# Patient Record
Sex: FEMALE | Race: ASIAN | NOT HISPANIC OR LATINO | Employment: UNEMPLOYED | URBAN - METROPOLITAN AREA
[De-identification: names, ages, dates, MRNs, and addresses within clinical notes are randomized per-mention and may not be internally consistent; named-entity substitution may affect disease eponyms.]

---

## 2021-07-30 ENCOUNTER — TELEPHONE (OUTPATIENT)
Dept: SCHEDULING | Facility: CLINIC | Age: 21
End: 2021-07-30

## 2021-07-30 NOTE — TELEPHONE ENCOUNTER
Reason for Call:  Other appointment    Detailed comments: first pregnancy visit- patient was scheduled incorrectly for end of September- first date of last menstrual period was June 19th- has had a positive home pregnancy test    Phone Number Patient can be reached at: Home number on file [phone] (home)    Best Time: any    Can we leave a detailed message on this number? YES    Call taken on 7/30/2021 at 9:57 AM by Roseanna Robertson

## 2021-07-31 ENCOUNTER — MYC MEDICAL ADVICE (OUTPATIENT)
Dept: FAMILY MEDICINE | Facility: CLINIC | Age: 21
End: 2021-07-31

## 2021-08-02 ENCOUNTER — MYC MEDICAL ADVICE (OUTPATIENT)
Dept: FAMILY MEDICINE | Facility: CLINIC | Age: 21
End: 2021-08-02

## 2021-08-02 DIAGNOSIS — Z34.90 PREGNANCY, UNSPECIFIED GESTATIONAL AGE: Primary | ICD-10-CM

## 2021-08-05 ENCOUNTER — MYC MEDICAL ADVICE (OUTPATIENT)
Dept: FAMILY MEDICINE | Facility: CLINIC | Age: 21
End: 2021-08-05

## 2021-08-05 NOTE — TELEPHONE ENCOUNTER
I responded to patient's concerns, however, she was requesting appointment sooner than 5/18/21. Okay to add into 20 or 40 minute slot next week (okay to use blocked spots). If no openings, then keep her appointment and let patient know. Thanks!

## 2021-08-13 ENCOUNTER — MYC MEDICAL ADVICE (OUTPATIENT)
Dept: FAMILY MEDICINE | Facility: CLINIC | Age: 21
End: 2021-08-13

## 2021-08-13 ENCOUNTER — PRENATAL OFFICE VISIT (OUTPATIENT)
Dept: FAMILY MEDICINE | Facility: CLINIC | Age: 21
End: 2021-08-13
Payer: COMMERCIAL

## 2021-08-13 ENCOUNTER — HOSPITAL ENCOUNTER (OUTPATIENT)
Dept: ULTRASOUND IMAGING | Facility: HOSPITAL | Age: 21
Discharge: HOME | End: 2021-08-13
Attending: FAMILY MEDICINE | Admitting: FAMILY MEDICINE
Payer: COMMERCIAL

## 2021-08-13 VITALS
DIASTOLIC BLOOD PRESSURE: 60 MMHG | HEIGHT: 60 IN | TEMPERATURE: 98.5 F | SYSTOLIC BLOOD PRESSURE: 104 MMHG | RESPIRATION RATE: 16 BRPM | WEIGHT: 128.5 LBS | OXYGEN SATURATION: 99 % | BODY MASS INDEX: 25.23 KG/M2 | HEART RATE: 75 BPM

## 2021-08-13 DIAGNOSIS — Z34.90 PREGNANCY, UNSPECIFIED GESTATIONAL AGE: ICD-10-CM

## 2021-08-13 DIAGNOSIS — Z34.00 SUPERVISION OF NORMAL FIRST PREGNANCY, ANTEPARTUM: ICD-10-CM

## 2021-08-13 DIAGNOSIS — Z34.90 PREGNANCY, UNSPECIFIED GESTATIONAL AGE: Primary | ICD-10-CM

## 2021-08-13 DIAGNOSIS — N91.2 AMENORRHEA: ICD-10-CM

## 2021-08-13 PROBLEM — E28.2 PCOS (POLYCYSTIC OVARIAN SYNDROME): Status: ACTIVE | Noted: 2020-07-02

## 2021-08-13 LAB
ABO/RH(D): NORMAL
ANTIBODY SCREEN: NEGATIVE
ERYTHROCYTE [DISTWIDTH] IN BLOOD BY AUTOMATED COUNT: 13 % (ref 10–15)
HBA1C MFR BLD: 5.4 % (ref 0–5.6)
HCG SERPL-ACNC: ABNORMAL MLU/ML (ref 0–4)
HCG UR QL: POSITIVE
HCT VFR BLD AUTO: 39.7 % (ref 35–47)
HGB BLD-MCNC: 13 G/DL (ref 11.7–15.7)
HIV 1+2 AB+HIV1 P24 AG SERPL QL IA: NEGATIVE
MCH RBC QN AUTO: 28.8 PG (ref 26.5–33)
MCHC RBC AUTO-ENTMCNC: 32.7 G/DL (ref 31.5–36.5)
MCV RBC AUTO: 88 FL (ref 78–100)
PLATELET # BLD AUTO: 211 10E3/UL (ref 150–450)
RBC # BLD AUTO: 4.51 10E6/UL (ref 3.8–5.2)
SPECIMEN EXPIRATION DATE: NORMAL
WBC # BLD AUTO: 7.2 10E3/UL (ref 4–11)

## 2021-08-13 PROCEDURE — 99000 SPECIMEN HANDLING OFFICE-LAB: CPT | Performed by: FAMILY MEDICINE

## 2021-08-13 PROCEDURE — 87491 CHLMYD TRACH DNA AMP PROBE: CPT | Performed by: FAMILY MEDICINE

## 2021-08-13 PROCEDURE — 85027 COMPLETE CBC AUTOMATED: CPT | Performed by: FAMILY MEDICINE

## 2021-08-13 PROCEDURE — 83655 ASSAY OF LEAD: CPT | Mod: 90 | Performed by: FAMILY MEDICINE

## 2021-08-13 PROCEDURE — 83036 HEMOGLOBIN GLYCOSYLATED A1C: CPT | Performed by: FAMILY MEDICINE

## 2021-08-13 PROCEDURE — 87086 URINE CULTURE/COLONY COUNT: CPT | Performed by: FAMILY MEDICINE

## 2021-08-13 PROCEDURE — 87389 HIV-1 AG W/HIV-1&-2 AB AG IA: CPT | Performed by: FAMILY MEDICINE

## 2021-08-13 PROCEDURE — 99207 PR FIRST OB VISIT: CPT | Performed by: FAMILY MEDICINE

## 2021-08-13 PROCEDURE — 84702 CHORIONIC GONADOTROPIN TEST: CPT | Performed by: FAMILY MEDICINE

## 2021-08-13 PROCEDURE — 87340 HEPATITIS B SURFACE AG IA: CPT | Performed by: FAMILY MEDICINE

## 2021-08-13 PROCEDURE — 86762 RUBELLA ANTIBODY: CPT | Performed by: FAMILY MEDICINE

## 2021-08-13 PROCEDURE — 86850 RBC ANTIBODY SCREEN: CPT | Performed by: FAMILY MEDICINE

## 2021-08-13 PROCEDURE — 86901 BLOOD TYPING SEROLOGIC RH(D): CPT | Performed by: FAMILY MEDICINE

## 2021-08-13 PROCEDURE — 36415 COLL VENOUS BLD VENIPUNCTURE: CPT | Performed by: FAMILY MEDICINE

## 2021-08-13 PROCEDURE — 87591 N.GONORRHOEAE DNA AMP PROB: CPT | Performed by: FAMILY MEDICINE

## 2021-08-13 PROCEDURE — 86780 TREPONEMA PALLIDUM: CPT | Performed by: FAMILY MEDICINE

## 2021-08-13 PROCEDURE — 81025 URINE PREGNANCY TEST: CPT | Performed by: FAMILY MEDICINE

## 2021-08-13 PROCEDURE — 86900 BLOOD TYPING SEROLOGIC ABO: CPT | Performed by: FAMILY MEDICINE

## 2021-08-13 PROCEDURE — 76801 OB US < 14 WKS SINGLE FETUS: CPT

## 2021-08-13 PROCEDURE — 99203 OFFICE O/P NEW LOW 30 MIN: CPT | Performed by: FAMILY MEDICINE

## 2021-08-13 PROCEDURE — 86803 HEPATITIS C AB TEST: CPT | Performed by: FAMILY MEDICINE

## 2021-08-13 RX ORDER — VITAMIN A ACETATE, BETA CAROTENE, ASCORBIC ACID, CHOLECALCIFEROL, .ALPHA.-TOCOPHEROL ACETATE, DL-, THIAMINE MONONITRATE, RIBOFLAVIN, NIACINAMIDE, PYRIDOXINE HYDROCHLORIDE, FOLIC ACID, CYANOCOBALAMIN, CALCIUM CARBONATE, FERROUS FUMARATE, ZINC OXIDE, CUPRIC OXIDE 3080; 12; 120; 400; 1; 1.84; 3; 20; 22; 920; 25; 200; 27; 10; 2 [IU]/1; UG/1; MG/1; [IU]/1; MG/1; MG/1; MG/1; MG/1; MG/1; [IU]/1; MG/1; MG/1; MG/1; MG/1; MG/1
1 TABLET, FILM COATED ORAL
COMMUNITY

## 2021-08-13 ASSESSMENT — MIFFLIN-ST. JEOR: SCORE: 1273.34

## 2021-08-13 NOTE — PROGRESS NOTES
New OB Clinic Note - 2021      SUBJECTIVE:  Dominga Moon is a 21 year old  female @ 13w1d who is here for new OB visit.   LMP 21- but also had some spotting for 3 days in . Has not yet had ultrasound- scheduled later today.  Current Concerns:   Seen at [facility] for infertility previously  She was going to start fertility treatments but then became pregnant  History of PCOS  Transferring here for care since she would like to deliver at [facility]  Occasional nausea.  She denies bleeding.  Occasional mild cramping. No loss of fluid. She denies HA.   Denies dysuria or hematuria.   Denies constipation.   OB History:  Patient is . Prior Pregnancies:  OB History    Para Term  AB Living   1 0 0 0 0 0   SAB TAB Ectopic Multiple Live Births   0 0 0 0 0      # Outcome Date GA Lbr Donald/2nd Weight Sex Delivery Anes PTL Lv   1 Current              She denies have a history of  birth before 37 weeks with a gomez gestation.  She denies have a history of preeclampsia in prior pregnancy.   She denies have a history of recurrent (>2) pregnancy losses.  She denies have a history of Down's syndrome, sickle cell anemia or other genetic disorder affecting a child in her family.  She denies have a history of major depression or postpartum depression.  She does not have a history of STI's including Chlamydia, gonorrhea, Hep B virus, syphilis, HPV, or genital herpes.   Social Hx:   She has support from her family and father of baby, , Ryan Leyva, is involved.   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  Current Medications: prenatal vitamins    Past Medical History:   Diagnosis Date     Infertility, female      Polycystic ovary syndrome      History reviewed. No pertinent surgical history.  Family History   Problem  "Relation Age of Onset     No Known Problems Mother      Heart Disease Father      Liver Disease Father      No Known Problems Brother      Polycystic ovary syndrome Sister      Current Outpatient Medications   Medication     Prenatal Vit-Fe Fumarate-FA (PRENATAL PLUS) 27-1 MG TABS     No current facility-administered medications for this visit.     ?  OBJECTIVE:  Vitals:    21 0906   BP: 104/60   Pulse: 75   Resp: 16   Temp: 98.5  F (36.9  C)   TempSrc: Oral   SpO2: 99%   Weight: 58.3 kg (128 lb 8 oz)   Height: 1.53 m (5' 0.25\")     Gen: Awake, alert, in no acute distress  CV: RRR with normal S1 and S2. No murmurs appreciated.  Resp: Lungs are clear to auscultation bilaterally without crackles or wheezing.  Abd: non-tender, non-distended.   Pelvic Exam: External genitalia normal. Not due for pap smear  Neuro: No focal deficits  FHT: did not visualize- has ultrasound later today    Results for orders placed or performed in visit on 21   HCG qualitative urine     Status: Abnormal   Result Value Ref Range    hCG Urine Qualitative Positive (A) Negative   Hemoglobin A1c     Status: Normal   Result Value Ref Range    Hemoglobin A1C 5.4 0.0 - 5.6 %   ABO/Rh type and screen     Status: None (In process)    Narrative    The following orders were created for panel order ABO/Rh type and screen.  Procedure                               Abnormality         Status                     ---------                               -----------         ------                     Adult Type and Screen[[id number]]                            In process                   Please view results for these tests on the individual orders.     ASSESSMENT/PLAN:  Dominga Moon is a 21 year old  at 13w1d by LMP. Estimated Date of Delivery: 22  1. Started prenatal vitamins.   2. Pelvic exam including GC, Chlamydia and PAP (if >21yrs) was completed.  3. Prenatal labs completed - prenatal profile, UA/UC, HIV   4. Reviewed eligibility " for low-dose aspirin therapy for prevention of preeclampsia (preeclampsia in prior pregnancy, pre-existing HTN or DM, or multiple other risk factors including  delivery, chronic HTN, DM, obesity, low socioeconomic status, non- ethnicity). Patient is not a candidate for this.   5. Reviewed eligibility for 17-hydroxyprogesterone therapy for prevention of  birth (prior gomez delivery before 37 weeks). Patient is not a candidate for this.   6. Offered first trimester screening for aneuploidy (trisomy 21, 13, 18) with laboratory and nuchal translucency and cell free DNA testing- she will check with insurance and let me know if she wants either of these  7. Counseled on benefits of breastfeeding. Patient is planning to breastfeed.   8. Discussed  diet, exercise, caffeine intake, daily vitamins, and regular prenatal exams.  Dominga was seen today for prenatal care.    Diagnoses and all orders for this visit:    Pregnancy, unspecified gestational age  -     Lead Venous Blood Confirm; Future  -     ABO/Rh type and screen; Future  -     Hepatitis B surface antigen; Future  -     CBC with platelets; Future  -     HIV Antigen Antibody Combo; Future  -     Rubella Antibody IgG Quantitative; Future  -     Treponema Abs w Reflex to RPR and Titer; Future  -     Cancel: Urine Culture Aerobic Bacterial  -     Hepatitis C antibody; Future  -     Hemoglobin A1c; Future  -     Urine Culture Aerobic Bacterial; Future  -     Lead Venous Blood Confirm  -     ABO/Rh type and screen  -     Hepatitis B surface antigen  -     CBC with platelets  -     HIV Antigen Antibody Combo  -     Rubella Antibody IgG Quantitative  -     Treponema Abs w Reflex to RPR and Titer  -     Hepatitis C antibody  -     Hemoglobin A1c  -     Urine Culture Aerobic Bacterial  -     Neisseria gonorrhoeae PCR  -     Chlamydia trachomatis PCR - Clinic Collect  -     HCG qualitative urine    ?  RTC in 4 weeks or sooner if problems. At next  visit: schedule anatomy scan around 20 weeks, discuss breastfeeding, offer quad screen.    Cassie Iyer MD    Options for treatment and follow-up care were reviewed with the patient and/or guardian. Dominga Moon and/or guardian engaged in the decision making process and verbalized understanding of the options discussed and agreed with the final plan.

## 2021-08-13 NOTE — PATIENT INSTRUCTIONS
"Patient Education     Pregnancy: Your First Trimester Changes  The first trimester is a time of rapid development for your baby. Because your baby is growing so quickly, it is important that you start a healthy lifestyle right away. By the end of the first trimester, your baby has formed all of its major body organs and weighs just over an ounce.      Actual size of baby is 1/4\"    Month 1 (weeks 1 to 4)  The placenta (the organ that nourishes your baby) begins to form. The brain, spinal cord, heart, gastrointestinal tract, and lungs begin to develop. Your baby is about   inch long by the end of the first month.      Actual size of baby is 1\"    Month 2 (weeks 5 to 8)  All of your baby s major body organs form. The face, fingers, toes, ears, and eyes appear. By the end of the month, your baby is about 1 inch long.      Actual size of baby is 3\"      Month 3 (weeks 9 to 12)  Your baby can open and close its fists and mouth. The sexual organs begin to form. As the first trimester ends, your baby is about 3 inches long.   [employer] last reviewed this educational content on 8/1/2020 2000-2021 [employer]. All rights reserved. This information is not intended as a substitute for professional medical care. Always follow your healthcare professional's instructions.           "

## 2021-08-14 LAB
BACTERIA UR CULT: NO GROWTH
HBV SURFACE AG SERPL QL IA: NONREACTIVE
T PALLIDUM AB SER QL: NEGATIVE

## 2021-08-15 LAB
C TRACH DNA SPEC QL NAA+PROBE: NEGATIVE
N GONORRHOEA DNA SPEC QL NAA+PROBE: NEGATIVE

## 2021-08-16 DIAGNOSIS — Z34.90 PREGNANCY, UNSPECIFIED GESTATIONAL AGE: Primary | ICD-10-CM

## 2021-08-16 LAB
HCV AB SERPL QL IA: NEGATIVE
RUBV IGG SERPL QL IA: POSITIVE

## 2021-08-17 ENCOUNTER — LAB (OUTPATIENT)
Dept: LAB | Facility: CLINIC | Age: 21
End: 2021-08-17
Payer: COMMERCIAL

## 2021-08-17 ENCOUNTER — MYC MEDICAL ADVICE (OUTPATIENT)
Dept: FAMILY MEDICINE | Facility: CLINIC | Age: 21
End: 2021-08-17

## 2021-08-17 DIAGNOSIS — Z34.90 PREGNANCY, UNSPECIFIED GESTATIONAL AGE: ICD-10-CM

## 2021-08-17 DIAGNOSIS — Z34.90 PREGNANCY, UNSPECIFIED GESTATIONAL AGE: Primary | ICD-10-CM

## 2021-08-17 LAB — HCG SERPL-ACNC: ABNORMAL MLU/ML (ref 0–4)

## 2021-08-17 PROCEDURE — 84702 CHORIONIC GONADOTROPIN TEST: CPT

## 2021-08-17 PROCEDURE — 36415 COLL VENOUS BLD VENIPUNCTURE: CPT

## 2021-08-17 NOTE — TELEPHONE ENCOUNTER
Called patient to discuss spotting.  She is currently 5 weeks and 5 days pregnant.  Patient had intercourse and noted a very faint pink on the paper after wiping.  Patient denies any abdominal pain or further bleeding.  Expressed that this can be normal with an increase in blood flow to the cervix.  Offered reassurance.  Patient had no further questions.    Maria Elena Uribe

## 2021-08-18 LAB — LEAD BLDV-MCNC: <2 UG/DL

## 2021-08-20 ENCOUNTER — LAB (OUTPATIENT)
Dept: LAB | Facility: CLINIC | Age: 21
End: 2021-08-20
Payer: COMMERCIAL

## 2021-08-20 DIAGNOSIS — Z34.90 PREGNANCY, UNSPECIFIED GESTATIONAL AGE: ICD-10-CM

## 2021-08-20 LAB — HCG SERPL-ACNC: ABNORMAL MLU/ML (ref 0–4)

## 2021-08-20 PROCEDURE — 84702 CHORIONIC GONADOTROPIN TEST: CPT

## 2021-08-20 PROCEDURE — 36415 COLL VENOUS BLD VENIPUNCTURE: CPT

## 2021-08-23 ENCOUNTER — HOSPITAL ENCOUNTER (OUTPATIENT)
Dept: ULTRASOUND IMAGING | Facility: HOSPITAL | Age: 21
Discharge: HOME | End: 2021-08-23
Attending: FAMILY MEDICINE | Admitting: FAMILY MEDICINE
Payer: COMMERCIAL

## 2021-08-23 ENCOUNTER — MYC MEDICAL ADVICE (OUTPATIENT)
Dept: FAMILY MEDICINE | Facility: CLINIC | Age: 21
End: 2021-08-23

## 2021-08-23 DIAGNOSIS — Z34.90 PREGNANCY, UNSPECIFIED GESTATIONAL AGE: Primary | ICD-10-CM

## 2021-08-23 DIAGNOSIS — Z34.90 PREGNANCY, UNSPECIFIED GESTATIONAL AGE: ICD-10-CM

## 2021-08-23 PROCEDURE — 76801 OB US < 14 WKS SINGLE FETUS: CPT

## 2021-08-23 NOTE — TELEPHONE ENCOUNTER
Reason for Call:  Other returning call    Detailed comments: Patient contacting doctor to request call to discuss US and lab testing.     Phone Number Patient can be reached at: Cell number on file:    Telephone Information:   Mobile [phone]     Best Time: ASAP     Can we leave a detailed message on this number? YES    Call taken on 8/23/2021 at 12:11 PM by Chris Hicks

## 2021-08-23 NOTE — TELEPHONE ENCOUNTER
I have called patient to discuss. Plan for clinic visit 8/25/21 to discuss options and consider repeating beta HCG.     Cassie Iyer

## 2021-08-25 ENCOUNTER — OFFICE VISIT (OUTPATIENT)
Dept: FAMILY MEDICINE | Facility: CLINIC | Age: 21
End: 2021-08-25
Payer: COMMERCIAL

## 2021-08-25 VITALS
HEIGHT: 60 IN | HEART RATE: 72 BPM | BODY MASS INDEX: 25.82 KG/M2 | TEMPERATURE: 98.3 F | SYSTOLIC BLOOD PRESSURE: 106 MMHG | DIASTOLIC BLOOD PRESSURE: 68 MMHG | OXYGEN SATURATION: 99 % | RESPIRATION RATE: 16 BRPM | WEIGHT: 131.5 LBS

## 2021-08-25 DIAGNOSIS — O20.0 THREATENED MISCARRIAGE: Primary | ICD-10-CM

## 2021-08-25 DIAGNOSIS — Z34.90 PREGNANCY, UNSPECIFIED GESTATIONAL AGE: ICD-10-CM

## 2021-08-25 LAB — HCG SERPL-ACNC: ABNORMAL MLU/ML (ref 0–4)

## 2021-08-25 PROCEDURE — 36415 COLL VENOUS BLD VENIPUNCTURE: CPT | Performed by: FAMILY MEDICINE

## 2021-08-25 PROCEDURE — 84702 CHORIONIC GONADOTROPIN TEST: CPT | Performed by: FAMILY MEDICINE

## 2021-08-25 PROCEDURE — 99214 OFFICE O/P EST MOD 30 MIN: CPT | Performed by: FAMILY MEDICINE

## 2021-08-25 ASSESSMENT — MIFFLIN-ST. JEOR: SCORE: 1286.95

## 2021-08-26 ENCOUNTER — MYC MEDICAL ADVICE (OUTPATIENT)
Dept: FAMILY MEDICINE | Facility: CLINIC | Age: 21
End: 2021-08-26

## 2021-08-27 NOTE — TELEPHONE ENCOUNTER
Called patient to discuss. She can take at home pregnancy test in 3-4 weeks. If negative, no follow-up needed. Reviewed warning signs.

## 2021-08-27 NOTE — TELEPHONE ENCOUNTER
RN Routing to provider:     Pt reports passing  a clot of tissue at 2155 8/26/21. (attached google image of similar)    This morning pt reports headache, fatigue, overnight cramping relieved somewhat by ibuprofen,  bleeding requiring pad change aprx 2x day, with occasional 'very little, period-like clots', does not report fever.     Pt requests next steps       Pt report:    8/26: Just today, at 9:55pm, I had finally passed a blob of blood of what I assume is the gestational sac and everything but there is still bleeding. What are some follow ups that I need to come in for?    8/27: I am feeling exhausted because of the contractions last night, it was super painful but I took some ibuprofens and it helped soothe the pain a little. I m also having headaches as well. We ll I started having brown spotting on the 24. The bleeding does not soak a whole pad. I change probably just 2 pads a day. For right now, the bleeding has slowed down. I ve seen clots here and there but they are very little. Like regular period clots.    Deena Lui RN on 8/27/2021 at 9:14 AM

## 2021-10-08 ENCOUNTER — MYC MEDICAL ADVICE (OUTPATIENT)
Dept: FAMILY MEDICINE | Facility: CLINIC | Age: 21
End: 2021-10-08

## 2021-10-08 NOTE — TELEPHONE ENCOUNTER
Called patient. She is having light bleeding for about 8 days. Reviewed this could be normal after miscarriage- her cycles may take a few months to regulate. No severe pain or fever. If she continues to have bleeding by the end of next week, she will mychart and I could get her in to be seen. If she has any heavy bleeding or clots, she should be seen earlier.

## 2021-10-08 NOTE — TELEPHONE ENCOUNTER
RN routing to PCP for review     Patient needs clarification about bleeding    Deena Lui RN on 10/8/2021 at 9:16 AM

## 2021-11-04 ENCOUNTER — MYC MEDICAL ADVICE (OUTPATIENT)
Dept: FAMILY MEDICINE | Facility: CLINIC | Age: 21
End: 2021-11-04

## 2021-11-05 NOTE — TELEPHONE ENCOUNTER
RN called patient for more information about symptoms, rings and rolls to busy signal    Following up with mychart       Breast pain can be very normal at different times throughout the menstrual cycle.     Is the pain in one location in one breast? Or, is it more general and in both breasts?     Have you noticed an lumps or bumps?    And, are you having any discharge from your nipples?     Again, some temporary general soreness in the breasts is not unusual.     I would want you to contact us if you have:    1. pain in one area of one breast  2. have any lumps or bumps in your breast(s) or under your arm(s)  3. you are having any nipple discharge     If none of those apply, I think monitoring it for the next several days is fine.    Routing to pcp for review

## 2022-03-07 ENCOUNTER — MYC MEDICAL ADVICE (OUTPATIENT)
Dept: FAMILY MEDICINE | Facility: CLINIC | Age: 22
End: 2022-03-07
Payer: COMMERCIAL

## 2022-03-07 DIAGNOSIS — N91.2 AMENORRHEA: Primary | ICD-10-CM

## 2022-03-07 NOTE — TELEPHONE ENCOUNTER
You can double book in any pregnancy confirmation visits. Okay to add her to this Thursday at 1:20pm?    I will place the urine pregnancy order in case patient just wants to wait until the 17th, but okay to just add her to my schedule the 10th.    Thanks!

## 2022-03-07 NOTE — TELEPHONE ENCOUNTER
Jeannine-Please order blood or urine test for confirmation of pregnancy    Writer responded to pt via [employer] with LAB ONLY appt for confirmation of pregnancy on 3/17/2022 at 10:45AM    Robyn GODINEZ RN  [facility]

## 2022-03-07 NOTE — TELEPHONE ENCOUNTER
Writer responded via [employer] in regards to PCP message for pregnancy confirmation visit.  Waiting confirmation on pt availability on March 10, 2022 at 1:20pm.    Robyn GODINEZ RN  [facility]

## 2022-03-08 NOTE — TELEPHONE ENCOUNTER
Writer responded via [employer]. Awaiting reply on which appt pt wishes to keep.    Robyn GODINEZ RN  [facility]

## 2022-03-08 NOTE — TELEPHONE ENCOUNTER
Writer responded via [employer] in regards to future lab appt.    Robyn GODINEZ RN  [facility]

## 2022-03-08 NOTE — TELEPHONE ENCOUNTER
Writer responded via [employer] confirming appt on March 10th, 2022.    Robyn GODINEZ RN  [facility]

## 2022-03-10 ENCOUNTER — OFFICE VISIT (OUTPATIENT)
Dept: FAMILY MEDICINE | Facility: CLINIC | Age: 22
End: 2022-03-10
Payer: COMMERCIAL

## 2022-03-10 VITALS
HEART RATE: 71 BPM | RESPIRATION RATE: 16 BRPM | WEIGHT: 126.75 LBS | HEIGHT: 60 IN | OXYGEN SATURATION: 99 % | BODY MASS INDEX: 24.88 KG/M2 | DIASTOLIC BLOOD PRESSURE: 64 MMHG | SYSTOLIC BLOOD PRESSURE: 100 MMHG

## 2022-03-10 DIAGNOSIS — N91.2 AMENORRHEA: ICD-10-CM

## 2022-03-10 DIAGNOSIS — Z34.90 PREGNANCY, UNSPECIFIED GESTATIONAL AGE: Primary | ICD-10-CM

## 2022-03-10 LAB — HCG UR QL: POSITIVE

## 2022-03-10 PROCEDURE — 81025 URINE PREGNANCY TEST: CPT | Performed by: FAMILY MEDICINE

## 2022-03-10 PROCEDURE — 99213 OFFICE O/P EST LOW 20 MIN: CPT | Performed by: FAMILY MEDICINE

## 2022-03-10 NOTE — PROGRESS NOTES
"Assessment/Plan:     Dominga Moon is a 21 year old  here for confirmation of pregnancy.    Dominga was seen today for pregnancy test.    Diagnoses and all orders for this visit:    Pregnancy, unspecified gestational age:  at 6w0d by exact LMP. Cycles are regular but longer- every 37 days. Will plan for dating US- gave # to schedule- prefers to wait until around 10 weeks. Continue prenatal vitamin.   -     US OB < 14 Weeks Single; Future  -     HCG qualitative urine; Future  -     HCG qualitative urine      Medications were reviewed for safety in pregnancy.  Risk factors identified today: none    Return to clinic in about 4-5 weeks for Initial OB Visit.     Subjective:     Dominga Moon is a 21 year old female who presents for evaluation of pregnancy confirmation.   LMP 22- exact  Last period was normal- every 37 days.  History of SAB in August  Current contraception:  None   Pregnancy is planned- excited. Have not yet told any family members.    Current symptoms also include: breast tenderness and fatigue. Occasional back pain/cramping. No bleeding or spotting.  UPT- positive on Monday.    Risk behaviors:    Tobacco use:  reports that she has never smoked. She has never used smokeless tobacco.  Drug or alcohol use: no      Current Outpatient Medications:      Prenatal Vit-Fe Fumarate-FA (PRENATAL PLUS) 27-1 MG TABS, Take 1 tablet by mouth , Disp: , Rfl:          Objective:     /64   Pulse 71   Resp 16   Ht 1.53 m (5' 0.25\")   Wt 57.5 kg (126 lb 12 oz)   SpO2 99%   BMI 24.55 kg/m    Gen:  A&A, NAD.  Abd: soft, non-tender  FHT's: too early    Lab Review  Results for orders placed or performed in visit on 03/10/22   HCG qualitative urine   Result Value Ref Range    hCG Urine Qualitative Positive (A) Negative              "

## 2022-03-14 ENCOUNTER — MYC MEDICAL ADVICE (OUTPATIENT)
Dept: FAMILY MEDICINE | Facility: CLINIC | Age: 22
End: 2022-03-14
Payer: COMMERCIAL

## 2022-03-14 DIAGNOSIS — N91.2 AMENORRHEA: Primary | ICD-10-CM

## 2022-03-16 ENCOUNTER — LAB (OUTPATIENT)
Dept: LAB | Facility: CLINIC | Age: 22
End: 2022-03-16
Payer: COMMERCIAL

## 2022-03-16 DIAGNOSIS — N91.2 AMENORRHEA: ICD-10-CM

## 2022-03-16 DIAGNOSIS — Z34.90 PREGNANCY, UNSPECIFIED GESTATIONAL AGE: Primary | ICD-10-CM

## 2022-03-16 LAB — HCG SERPL-ACNC: 2457 MLU/ML (ref 0–4)

## 2022-03-16 PROCEDURE — 84702 CHORIONIC GONADOTROPIN TEST: CPT

## 2022-03-16 PROCEDURE — 36415 COLL VENOUS BLD VENIPUNCTURE: CPT

## 2022-03-16 NOTE — TELEPHONE ENCOUNTER
Patient requesting follow-up quantitative hCG    -routing to PCP for orders if appropriate    Deena Lui RN on 3/16/2022 at 5:01 PM

## 2022-03-18 NOTE — TELEPHONE ENCOUNTER
Writer responded via [employer] for further information.    Robyn GODINEZ RN  [facility]

## 2022-03-21 NOTE — TELEPHONE ENCOUNTER
Reviewed my chart messages and spoke with RN. RN will call patient to see if she can come in within the next 40 minutes- if not, plan to keep the appointment tomorrow with Dr. Alvarez- will recheck beta HCG at that time.     Cassie Iyer

## 2022-03-21 NOTE — TELEPHONE ENCOUNTER
Writer talked with provider in regards to appt today.  Called pt and expressed that provider could see her now if she was able to come.  Pt stated that she has a class at 10am and will just keep the appt tomorrow with Dr. Alvarez.    Recommendations given to call clinic back or seek further medical care at the nearest Walk In Clinic or ED if her spotting should become heavier with or without cramping and to continue to abstain from sexual intercourse until she sees the provider tomorrow.    Pt expressed verbal understanding and agreement and will keep appt tomorrow.    Robyn GODINEZ RN  [facility]

## 2022-03-21 NOTE — TELEPHONE ENCOUNTER
Dr. Iyer-See "[employer] message thread in regards to spotting with intercourse.  Please advise.  Okay to DB?    Robyn GODINEZ RN  [facility]

## 2022-03-22 ENCOUNTER — OFFICE VISIT (OUTPATIENT)
Dept: FAMILY MEDICINE | Facility: CLINIC | Age: 22
End: 2022-03-22
Payer: COMMERCIAL

## 2022-03-22 VITALS
HEIGHT: 60 IN | DIASTOLIC BLOOD PRESSURE: 60 MMHG | TEMPERATURE: 98.4 F | HEART RATE: 82 BPM | RESPIRATION RATE: 16 BRPM | BODY MASS INDEX: 25.13 KG/M2 | WEIGHT: 128 LBS | SYSTOLIC BLOOD PRESSURE: 120 MMHG | OXYGEN SATURATION: 99 %

## 2022-03-22 DIAGNOSIS — Z34.90 EARLY STAGE OF PREGNANCY: ICD-10-CM

## 2022-03-22 DIAGNOSIS — O26.851 SPOTTING AFFECTING PREGNANCY IN FIRST TRIMESTER: Primary | ICD-10-CM

## 2022-03-22 LAB — HCG SERPL-ACNC: ABNORMAL MLU/ML (ref 0–4)

## 2022-03-22 PROCEDURE — 84702 CHORIONIC GONADOTROPIN TEST: CPT | Performed by: FAMILY MEDICINE

## 2022-03-22 PROCEDURE — 99214 OFFICE O/P EST MOD 30 MIN: CPT | Performed by: FAMILY MEDICINE

## 2022-03-22 PROCEDURE — 36415 COLL VENOUS BLD VENIPUNCTURE: CPT | Performed by: FAMILY MEDICINE

## 2022-03-22 NOTE — PATIENT INSTRUCTIONS
Gisela:   Here is more information on the different types of options for your prenatal care:  Prenatal and Maternal Care ([email or web address])   -Dr. Alvarez      Patient Education     Bleeding During Early Pregnancy    If you ve had bleeding early in your pregnancy, you re not alone. Many other pregnant women have early bleeding, too. And in most cases, nothing is wrong. But your healthcare provider still needs to know about it. They may want to do tests to find out why you re bleeding. Call your provider if you see bleeding during pregnancy. Tell your provider if your blood is Rh negative. Then they can figure out if you need anti-D immune globulin treatment.  What causes early bleeding?  The cause of bleeding early in pregnancy is often unknown. But many factors early on in pregnancy may lead to light bleeding (called spotting) or heavier bleeding. These include:    Having sex    When the embryo implants on the uterine wall    Bleeding between the sac membrane and the uterus (subchorionic bleeding)    Pregnancy loss (miscarriage)    The embryo implants outside of the uterus (ectopic pregnancy)  If you see spotting  Light bleeding is the most common type of bleeding in early pregnancy. If you see it, call your healthcare provider. Chances are, they will tell you that you can care for yourself at home.  If tests are needed  Depending on how much you bleed, your healthcare provider may ask you to come in for some tests. A pelvic exam, for instance, can help see how far along your pregnancy is. You also may have an ultrasound or a Doppler test. These imaging tests use sound waves to check the health of your baby. The ultrasound may be done on your belly or inside your vagina. You may also need a special blood test. This test compares your hormone levels in blood samples taken 2 days apart. The results can help your provider learn more about the implantation of the embryo. Your blood type will also need to be checked to assess  if you will need to be treated for Rh sensitization.   Warning signs  If your bleeding doesn t stop or if you have any of the following, get medical care right away:    Soaking a sanitary pad each hour    Bleeding like you re having a period    Cramping or severe belly pain    Feeling dizzy or faint    Tissue passing through your vagina    Bleeding at any time after the first trimester  Questions you may be asked  Bleeding early in pregnancy isn't normal. But it is common. If you ve seen any bleeding, you may be concerned. But keep in mind that bleeding alone doesn t mean something is wrong. Just be sure to call your healthcare provider right away. They may ask you questions like these to help find the cause of your bleeding:    When did your bleeding start?    Is your bleeding very light or is it like a period?    Is the blood bright red or brownish?    Have you had sex recently?    Have you had pain or cramping?    Have you felt dizzy or faint?  Monitoring your pregnancy  Bleeding will often stop as quickly as it began. Your pregnancy may go on a normal path again. You may need to make a few extra prenatal visits. But you and your baby will most likely be fine.  [employer] last reviewed this educational content on 2/1/2020 2000-2021 [employer]. All rights reserved. This information is not intended as a substitute for professional medical care. Always follow your healthcare professional's instructions.

## 2022-03-22 NOTE — PROGRESS NOTES
ASSESSMENT/PLAN:    21 year old  at 7w5d by certain LMP 22, although cycles long and dates could be a little off.    Spotting affecting pregnancy in first trimester  Suspect friable cervix given most of bleeding is postcoital, although cannot rule out subchorionic hemorrhage, miscarriage, or other causes.  She has not yet had an ultrasound, and prefers to wait on that until the one scheduled on .  Will get another quantitative beta-hCG today for comparison (had one last week).  Further management will depend on those results and her symptoms.  If serum hCG is rising appropriately and she is not feeling any worse, I think it is okay to hold off until the  ultrasound as currently scheduled.  - HCG quantitative pregnancy    Early stage of pregnancy  Patient had a few questions on what is safe and what to expect during pregnancy, as well as options for her prenatal care.  We specifically discussed normal vaginal discharge and pregnancy, back pain, topical medications to avoid during pregnancy (specifically would avoid retinoids), and a difference between family medicine with obstetrics, midwife, or OB/GYN care.       Return in about 23 days (around 2022) for Initial OB, with your regular doctor (if you are continuing with physician care).     I spent a total of 38 minutes on the day of the visit.   Time spent doing chart review, history and exam, documentation and further activities per the note     SUBJECTIVE:  Dominga Moon is a 21 year old female here for Spotting with intercourse during pregnancy (x 1 week )    Spotting 8 days ago after intercourse  Just a little, pink.    Spotting again 6 days after intercourse.    Brown discharge 2 days ago after orgasm without vaginal intercourse..    No cramping.    No spotting/bleeding now.    Still feels pregnant.    Had miscarriage last Aug    OBJECTIVE:  :  /60   Pulse 82   Temp 98.4  F (36.9  C) (Oral)   Resp 16   Ht 1.53 m (5'  "0.25\")   Wt 58.1 kg (128 lb)   LMP 01/27/2022 (Exact Date)   SpO2 99%   BMI 24.79 kg/m    Wt Readings from Last 3 Encounters:   03/22/22 58.1 kg (128 lb)   03/10/22 57.5 kg (126 lb 12 oz)   08/25/21 59.6 kg (131 lb 8 oz)         Gen:  A&A, NAD  Abd: soft and nontender.  Unable to easily locate definite fetus on hand-held ultrasound  :  A couple of spots of blood on surface of cervix, cervix friable.  No blood seen at os.  Cervical os closed on digital exam.        "

## 2022-04-03 ENCOUNTER — HEALTH MAINTENANCE LETTER (OUTPATIENT)
Age: 22
End: 2022-04-03

## 2022-04-07 ENCOUNTER — HOSPITAL ENCOUNTER (OUTPATIENT)
Dept: ULTRASOUND IMAGING | Facility: HOSPITAL | Age: 22
Discharge: HOME | End: 2022-04-07
Attending: FAMILY MEDICINE | Admitting: FAMILY MEDICINE
Payer: COMMERCIAL

## 2022-04-07 DIAGNOSIS — Z34.90 PREGNANCY, UNSPECIFIED GESTATIONAL AGE: ICD-10-CM

## 2022-04-07 PROCEDURE — 76801 OB US < 14 WKS SINGLE FETUS: CPT

## 2022-04-14 ENCOUNTER — PRENATAL OFFICE VISIT (OUTPATIENT)
Dept: FAMILY MEDICINE | Facility: CLINIC | Age: 22
End: 2022-04-14
Payer: COMMERCIAL

## 2022-04-14 VITALS
OXYGEN SATURATION: 99 % | SYSTOLIC BLOOD PRESSURE: 110 MMHG | TEMPERATURE: 98 F | RESPIRATION RATE: 16 BRPM | HEART RATE: 82 BPM | DIASTOLIC BLOOD PRESSURE: 66 MMHG | WEIGHT: 130 LBS | HEIGHT: 60 IN | BODY MASS INDEX: 25.52 KG/M2

## 2022-04-14 DIAGNOSIS — Z34.90 PREGNANCY, UNSPECIFIED GESTATIONAL AGE: Primary | ICD-10-CM

## 2022-04-14 LAB
ABO/RH(D): NORMAL
ANTIBODY SCREEN: NEGATIVE
ERYTHROCYTE [DISTWIDTH] IN BLOOD BY AUTOMATED COUNT: 13 % (ref 10–15)
HCT VFR BLD AUTO: 38.3 % (ref 35–47)
HGB BLD-MCNC: 12.6 G/DL (ref 11.7–15.7)
HIV 1+2 AB+HIV1 P24 AG SERPL QL IA: NEGATIVE
MCH RBC QN AUTO: 28.7 PG (ref 26.5–33)
MCHC RBC AUTO-ENTMCNC: 32.9 G/DL (ref 31.5–36.5)
MCV RBC AUTO: 87 FL (ref 78–100)
PLATELET # BLD AUTO: 229 10E3/UL (ref 150–450)
RBC # BLD AUTO: 4.39 10E6/UL (ref 3.8–5.2)
SPECIMEN EXPIRATION DATE: NORMAL
WBC # BLD AUTO: 9.6 10E3/UL (ref 4–11)

## 2022-04-14 PROCEDURE — 83655 ASSAY OF LEAD: CPT | Mod: 90 | Performed by: FAMILY MEDICINE

## 2022-04-14 PROCEDURE — 85027 COMPLETE CBC AUTOMATED: CPT | Performed by: FAMILY MEDICINE

## 2022-04-14 PROCEDURE — 87086 URINE CULTURE/COLONY COUNT: CPT | Performed by: FAMILY MEDICINE

## 2022-04-14 PROCEDURE — 86762 RUBELLA ANTIBODY: CPT | Performed by: FAMILY MEDICINE

## 2022-04-14 PROCEDURE — 99207 PR FIRST OB VISIT: CPT | Performed by: FAMILY MEDICINE

## 2022-04-14 PROCEDURE — 87340 HEPATITIS B SURFACE AG IA: CPT | Performed by: FAMILY MEDICINE

## 2022-04-14 PROCEDURE — 87389 HIV-1 AG W/HIV-1&-2 AB AG IA: CPT | Performed by: FAMILY MEDICINE

## 2022-04-14 PROCEDURE — 86850 RBC ANTIBODY SCREEN: CPT | Performed by: FAMILY MEDICINE

## 2022-04-14 PROCEDURE — 99213 OFFICE O/P EST LOW 20 MIN: CPT | Performed by: FAMILY MEDICINE

## 2022-04-14 PROCEDURE — 99000 SPECIMEN HANDLING OFFICE-LAB: CPT | Performed by: FAMILY MEDICINE

## 2022-04-14 PROCEDURE — 36415 COLL VENOUS BLD VENIPUNCTURE: CPT | Performed by: FAMILY MEDICINE

## 2022-04-14 PROCEDURE — 86803 HEPATITIS C AB TEST: CPT | Performed by: FAMILY MEDICINE

## 2022-04-14 PROCEDURE — 87591 N.GONORRHOEAE DNA AMP PROB: CPT | Performed by: FAMILY MEDICINE

## 2022-04-14 PROCEDURE — 87491 CHLMYD TRACH DNA AMP PROBE: CPT | Performed by: FAMILY MEDICINE

## 2022-04-14 PROCEDURE — 86780 TREPONEMA PALLIDUM: CPT | Performed by: FAMILY MEDICINE

## 2022-04-14 PROCEDURE — 86901 BLOOD TYPING SEROLOGIC RH(D): CPT | Performed by: FAMILY MEDICINE

## 2022-04-14 PROCEDURE — 86900 BLOOD TYPING SEROLOGIC ABO: CPT | Performed by: FAMILY MEDICINE

## 2022-04-14 NOTE — PROGRESS NOTES
New OB Clinic Note - 2022     SUBJECTIVE:  Gisela Moon is a 21 year old  female @ 8w6d who is here for new OB visit.   LMP 22.  Ultrasound at 7w6d gave EDC of 22.  Current Concerns: She reports nausea- drinking water helps her symptoms. Sensitive to certain smells.   She denies bleeding, cramping. No loss of fluid. She denies HA.   Denies dysuria or hematuria.   Denies constipation.  OB History:  Patient is . Prior Pregnancies:  OB History    Para Term  AB Living   2 0 0 0 1 0   SAB IAB Ectopic Multiple Live Births   1 0 0 0 0      # Outcome Date GA Lbr Donald/2nd Weight Sex Delivery Anes PTL Lv   2 Current            1 SAB 2021             She does not have a history of  birth before 37 weeks with a gomez gestation.  She does not have a history of preeclampsia in prior pregnancy.   She does not have a history of recurrent (>2) pregnancy losses.  She does not have a history of Down's syndrome, sickle cell anemia or other genetic disorder affecting a child in her family.  She does not have a history of major depression or postpartum depression.  She does not have a history of STI's including Chlamydia, gonorrhea, Hep B virus, syphilis, HPV, or genital herpes.   Social Hx:   She has support from her , Ryan Leyva  She has not used tobacco, has not used EtOH and has not used illicit drugs.  Current Medications: prenatal vitamins    Past Medical History:   Diagnosis Date     Infertility, female      Polycystic ovary syndrome      No past surgical history on file.  Family History   Problem Relation Age of Onset     No Known Problems Mother      Heart Disease Father      Liver Disease Father      No Known Problems Brother      Polycystic ovary syndrome Sister      Current Outpatient Medications   Medication     Prenatal Vit-Fe Fumarate-FA (PRENATAL PLUS) 27-1 MG TABS     No current facility-administered medications for this visit.     ?  OBJECTIVE:  Vitals:  "   04/14/22 1253   BP: 110/66   Pulse: 82   Resp: 16   Temp: 98  F (36.7  C)   TempSrc: Oral   SpO2: 99%   Weight: 59 kg (130 lb)   Height: 1.53 m (5' 0.25\")     Gen: Awake, alert, in no acute distress  Abd: non-tender, non-distended. Bowel sounds present.   Pelvic Exam: External genitalia normal  Lower extremities: No edema  Neuro: No focal deficits  FHT: normal  Results for orders placed or performed in visit on 04/14/22   Hepatitis B surface antigen     Status: Normal   Result Value Ref Range    Hepatitis B Surface Antigen Nonreactive Nonreactive   CBC with platelets     Status: Normal   Result Value Ref Range    WBC Count 9.6 4.0 - 11.0 10e3/uL    RBC Count 4.39 3.80 - 5.20 10e6/uL    Hemoglobin 12.6 11.7 - 15.7 g/dL    Hematocrit 38.3 35.0 - 47.0 %    MCV 87 78 - 100 fL    MCH 28.7 26.5 - 33.0 pg    MCHC 32.9 31.5 - 36.5 g/dL    RDW 13.0 10.0 - 15.0 %    Platelet Count 229 150 - 450 10e3/uL   HIV Antigen Antibody Combo     Status: Normal   Result Value Ref Range    HIV Antigen Antibody Combo Negative Negative   Rubella Antibody IgG     Status: None   Result Value Ref Range    Rubella Avelina IgG Instrument Value 1.67 <0.90 Index    Rubella Antibody IgG Positive    Treponema Abs w Reflex to RPR and Titer     Status: Normal   Result Value Ref Range    Treponema Antibody Total Nonreactive Nonreactive   Hepatitis C antibody     Status: Normal   Result Value Ref Range    Hepatitis C Antibody Negative Negative    Narrative    Assay performance characteristics have not been established for newborns, infants, children (<18 years) or populations of immunocompromised or immunosuppressed patients.    Lead Venous Blood Confirm     Status: None   Result Value Ref Range    Lead Venous Blood <2.0 <=4.9 ug/dL   Adult Type and Screen     Status: None   Result Value Ref Range    ABO/RH(D) B POS     Antibody Screen Negative Negative    SPECIMEN EXPIRATION DATE [id number]    Urine Culture Aerobic Bacterial     Status: None    " Specimen: Urine, NOS   Result Value Ref Range    Culture No Growth    Chlamydia trachomatis PCR     Status: Normal    Specimen: Cervix; Swab   Result Value Ref Range    Chlamydia trachomatis Negative Negative   Neisseria gonorrhoeae PCR     Status: Normal    Specimen: Cervix; Swab   Result Value Ref Range    Neisseria gonorrhoeae Negative Negative   ABO/Rh type and screen     Status: None    Narrative    The following orders were created for panel order ABO/Rh type and screen.  Procedure                               Abnormality         Status                     ---------                               -----------         ------                     Adult Type and Screen[[id number]]                            Edited Result - FINAL        Please view results for these tests on the individual orders.     ASSESSMENT/PLAN:  Gisela Moon is a 21 year old  at 8w6d weeks by 7 week US. Estimated Date of Delivery: 2022.   1. Discussed recommended weight gain, healthy eating habits, exercise, common first trimester concerns (nausea, vomiting, constipation, fatigue, GERD, urinary frequency) and warning signs for miscarriage and  labor (bleeding, cramping).   2. Started prenatal vitamins.   3. Prenatal labs completed - prenatal profile, UA/UC, HIV   4. Reviewed eligibility for low-dose aspirin therapy for prevention of preeclampsia (preeclampsia in prior pregnancy, pre-existing HTN or DM, or multiple other risk factors including  delivery, chronic HTN, DM, obesity, low socioeconomic status, non- ethnicity). Patient is not a candidate for this.   5. Reviewed eligibility for 17-hydroxyprogesterone therapy for prevention of  birth (prior gomez delivery before 37 weeks). Patient is not a candidate for this.   6. Plan for cfDNA at next visit  7. Counseled on benefits of breastfeeding. Patient is planning to breastfeed.     Gisela Fuller was seen today for prenatal care.    Diagnoses and  all orders for this visit:    Pregnancy, unspecified gestational age  -     ABO/Rh type and screen; Future  -     Hepatitis B surface antigen; Future  -     CBC with platelets; Future  -     HIV Antigen Antibody Combo; Future  -     Rubella Antibody IgG; Future  -     Treponema Abs w Reflex to RPR and Titer; Future  -     Urine Culture Aerobic Bacterial; Future  -     Hepatitis C antibody; Future  -     Chlamydia trachomatis PCR; Future  -     Neisseria gonorrhoeae PCR; Future  -     Lead Venous Blood Confirm; Future  -     ABO/Rh type and screen  -     Hepatitis B surface antigen  -     CBC with platelets  -     HIV Antigen Antibody Combo  -     Rubella Antibody IgG  -     Treponema Abs w Reflex to RPR and Titer  -     Hepatitis C antibody  -     Lead Venous Blood Confirm  -     Urine Culture Aerobic Bacterial  -     Chlamydia trachomatis PCR  -     Neisseria gonorrhoeae PCR      ?  RTC in 4 weeks or sooner if problems. At next visit: cfDNA with anais Iyer MD    Options for treatment and follow-up care were reviewed with the patient and/or guardian. Gisela Moon and/or guardian engaged in the decision making process and verbalized understanding of the options discussed and agreed with the final plan.

## 2022-04-15 LAB
C TRACH DNA SPEC QL NAA+PROBE: NEGATIVE
HBV SURFACE AG SERPL QL IA: NONREACTIVE
HCV AB SERPL QL IA: NEGATIVE
N GONORRHOEA DNA SPEC QL NAA+PROBE: NEGATIVE
RUBV IGG SERPL QL IA: 1.67 INDEX
RUBV IGG SERPL QL IA: POSITIVE
T PALLIDUM AB SER QL: NONREACTIVE

## 2022-04-16 LAB — BACTERIA UR CULT: NO GROWTH

## 2022-04-17 ENCOUNTER — MYC MEDICAL ADVICE (OUTPATIENT)
Dept: FAMILY MEDICINE | Facility: CLINIC | Age: 22
End: 2022-04-17
Payer: COMMERCIAL

## 2022-04-18 LAB — LEAD BLDV-MCNC: <2 UG/DL

## 2022-04-18 NOTE — TELEPHONE ENCOUNTER
Attempted calling patient to discuss. No answer. Did not leave voice message.     Please call patient later to clarify how much bleeding she is having. Does she have any abdominal cramping?     If she has small amount of bleeding post-orgasm or intercourse, this can be normal, due to increased blood flow to the cervix during pregnancy.     However, if she has any abdominal cramping or increased vaginal bleeding, it would be best to do an in-person evaluation.

## 2022-04-18 NOTE — TELEPHONE ENCOUNTER
See message below.    Patient had small amount, maybe quarter size spot of bright red blood when wiping about 2 hours after intercourse yesterday.  Patient only had 1 episode and no clots or heavy bleeding.  Patient had no abdominal or back pain / cramping.  Patient has no fever or other symptoms.    Discussed with patient to call clinic back or triage line is open 24 hours a day if bleeding again, vaginal discharge or any pain.    Message routed to Dr Iyer for review / RADHA Ramirez RN  [facility],     After an orgasm, I am having some red bleeding. I usually have pink and brown spotting. But this time is different. I am really scared this is not safe. What should I do?      Gisela

## 2022-05-13 ENCOUNTER — PRENATAL OFFICE VISIT (OUTPATIENT)
Dept: FAMILY MEDICINE | Facility: CLINIC | Age: 22
End: 2022-05-13
Payer: COMMERCIAL

## 2022-05-13 VITALS
HEART RATE: 75 BPM | OXYGEN SATURATION: 99 % | HEIGHT: 60 IN | TEMPERATURE: 98 F | SYSTOLIC BLOOD PRESSURE: 100 MMHG | BODY MASS INDEX: 24.69 KG/M2 | DIASTOLIC BLOOD PRESSURE: 64 MMHG | WEIGHT: 125.75 LBS | RESPIRATION RATE: 16 BRPM

## 2022-05-13 DIAGNOSIS — Z34.90 PREGNANCY, UNSPECIFIED GESTATIONAL AGE: Primary | ICD-10-CM

## 2022-05-13 PROCEDURE — 36415 COLL VENOUS BLD VENIPUNCTURE: CPT | Performed by: FAMILY MEDICINE

## 2022-05-13 PROCEDURE — 99207 PR PRENATAL VISIT: CPT | Performed by: FAMILY MEDICINE

## 2022-05-13 NOTE — PATIENT INSTRUCTIONS
[phone] schedule your anatomy scan around 20 weeks (around July 1st)

## 2022-05-15 NOTE — PROGRESS NOTES
"SUBJECTIVE:   at 13w1d by 7 week US. Estimated Date of Delivery: 2022.  She is doing well. She denies abdominal pain/cramping, vaginal bleeding.  Concerns: interested in cell free DNA testing today- would like to find out gender. She would like to have her anatomy scan ultrasound at [facility] if possible  ROS  Negative except as noted above in HPI.  OBJECTIVE:  Blood pressure 100/64, pulse 75, temperature 98  F (36.7  C), temperature source Oral, resp. rate 16, height 1.53 m (5' 0.25\"), weight 57 kg (125 lb 12 oz), last menstrual period 2022, SpO2 99 %, not currently breastfeeding.  Gen: comfortable, no acute distress.  See OB Vitals flowsheet.  ASSESSMENT/PLAN:  Gisela Fuller was seen today for prenatal care.    Diagnoses and all orders for this visit:    Pregnancy, unspecified gestational age  -     Ob/Gyn Referral; Future- ordered- patient will call to schedule her anatomy scan around 20 weeks gestation  -     [facility] Non-Invasive Prenatal Screening; Future  -     [facility] Non-Invasive Prenatal Screening      -IUP at 13w1d:     Prenatal labs reviewed     RTC in 4 weeks or sooner with problems. Offer MSAFP next visit      Cassie Iyer MD    "

## 2022-05-17 LAB — SCANNED LAB RESULT: NORMAL

## 2022-06-10 ENCOUNTER — PRENATAL OFFICE VISIT (OUTPATIENT)
Dept: FAMILY MEDICINE | Facility: CLINIC | Age: 22
End: 2022-06-10
Payer: COMMERCIAL

## 2022-06-10 VITALS
DIASTOLIC BLOOD PRESSURE: 60 MMHG | BODY MASS INDEX: 24.94 KG/M2 | HEART RATE: 67 BPM | RESPIRATION RATE: 20 BRPM | OXYGEN SATURATION: 99 % | TEMPERATURE: 98.8 F | HEIGHT: 60 IN | WEIGHT: 127 LBS | SYSTOLIC BLOOD PRESSURE: 98 MMHG

## 2022-06-10 DIAGNOSIS — Z34.90 PREGNANCY, UNSPECIFIED GESTATIONAL AGE: Primary | ICD-10-CM

## 2022-06-10 DIAGNOSIS — G44.209 TENSION HEADACHE: ICD-10-CM

## 2022-06-10 PROCEDURE — 99207 PR PRENATAL VISIT: CPT | Performed by: FAMILY MEDICINE

## 2022-06-10 NOTE — PROGRESS NOTES
"SUBJECTIVE:   at 17w0d. Estimated Date of Delivery: 2022.  She is doing well. Not yet feeling fetal movement. Has ultrasound schedule 22 at [facility]. Tension headaches- more frequent since being pregnant. Using massage, tylenol as needed.  ROS  Negative except as noted above in HPI.  OBJECTIVE:  Blood pressure 98/60, pulse 67, temperature 98.8  F (37.1  C), temperature source Oral, resp. rate 20, height 1.53 m (5' 0.25\"), weight 57.6 kg (127 lb), last menstrual period 2022, SpO2 99 %, not currently breastfeeding.  Gen: comfortable, no acute distress.  See OB Vitals flowsheet.  ASSESSMENT/PLAN:  Gisela Fuller was seen today for prenatal care.    Diagnoses and all orders for this visit:    Pregnancy, unspecified gestational age    Tension headache: No warning signs. Normotensive. Reviewed symptomatic treatment.      -IUP at 17w0d:     Prenatal labs reviewed     Reviewed msAFP- declines    cfDNA low risk    RTC in 4 weeks or sooner with problems.        Cassie Iyer MD    "

## 2022-06-25 ENCOUNTER — MYC MEDICAL ADVICE (OUTPATIENT)
Dept: FAMILY MEDICINE | Facility: CLINIC | Age: 22
End: 2022-06-25

## 2022-06-25 DIAGNOSIS — O47.00 PRETERM CONTRACTIONS: ICD-10-CM

## 2022-06-25 DIAGNOSIS — Z34.00 SUPERVISION OF NORMAL FIRST PREGNANCY, ANTEPARTUM: Primary | ICD-10-CM

## 2022-06-29 NOTE — TELEPHONE ENCOUNTER
Mariana Iyer,  Please review patient message via [employer] and advise.      Thank you    CLARIBEL BookerN, RN  [facility]

## 2022-06-29 NOTE — TELEPHONE ENCOUNTER
Called patient and clarified. Only mild back pain. No cramping or bleeding. [facility]. Patient will cancel this appointment. Reviewed warning signs.    Cassie Iyer

## 2022-07-05 ENCOUNTER — TRANSFERRED RECORDS (OUTPATIENT)
Dept: HEALTH INFORMATION MANAGEMENT | Facility: CLINIC | Age: 22
End: 2022-07-05

## 2022-07-07 DIAGNOSIS — O26.879 SHORT CERVIX AFFECTING PREGNANCY: Primary | ICD-10-CM

## 2022-07-08 ENCOUNTER — PRENATAL OFFICE VISIT (OUTPATIENT)
Dept: FAMILY MEDICINE | Facility: CLINIC | Age: 22
End: 2022-07-08
Payer: COMMERCIAL

## 2022-07-08 VITALS
DIASTOLIC BLOOD PRESSURE: 60 MMHG | TEMPERATURE: 98 F | HEART RATE: 89 BPM | RESPIRATION RATE: 16 BRPM | BODY MASS INDEX: 26.16 KG/M2 | OXYGEN SATURATION: 99 % | WEIGHT: 133.25 LBS | HEIGHT: 60 IN | SYSTOLIC BLOOD PRESSURE: 104 MMHG

## 2022-07-08 DIAGNOSIS — Z34.90 PREGNANCY, UNSPECIFIED GESTATIONAL AGE: Primary | ICD-10-CM

## 2022-07-08 DIAGNOSIS — N88.3 SHORT CERVIX: ICD-10-CM

## 2022-07-08 PROCEDURE — 99207 PR PRENATAL VISIT: CPT | Performed by: FAMILY MEDICINE

## 2022-07-08 NOTE — PROGRESS NOTES
"SUBJECTIVE:   at 21w0d. Estimated Date of Delivery: 2022.  She is doing well. She denies nausea and vomiting. She denies vaginal bleeding, leakage of fluid. Fetal movement is present.   Concerns: Intermittent low back pain. Occasional sharp lower abdominal pain but denies any tightening or cramping.   ROS  Negative except as noted above in HPI.  OBJECTIVE:  Blood pressure 104/60, pulse 89, temperature 98  F (36.7  C), temperature source Oral, resp. rate 16, height 1.53 m (5' 0.25\"), weight 60.4 kg (133 lb 4 oz), last menstrual period 2022, SpO2 99 %, not currently breastfeeding.  Gen: comfortable, no acute distress.  See OB Vitals flowsheet.  ASSESSMENT/PLAN:  Gisela Fuller was seen today for prenatal care.    Diagnoses and all orders for this visit:    Pregnancy, unspecified gestational age    Short cervix: Cervical length 2.5 cm on anatomy scan- plan for repeat transvaginal US for cervical length in 2 weeks- ordered and patient will call Monday to [facility] to schedule.       -IUP at 21w0d:     Prenatal labs reviewed .     RTC in 4 weeks or sooner with problems. 1 hour GTT next visit      Cassie Iyer MD    "

## 2022-07-08 NOTE — PROGRESS NOTES
Called patient regarding ultrasound at [facility]- cervical length was 2.5 cm. Plan to repeat in 2 weeks per Dr. Thibodeaux recommendations. I called patient to discuss. Referral placed. Advised to call on Monday to schedule her appointment for in about 2 weeks.     Cassie Iyer

## 2022-07-12 ENCOUNTER — MYC MEDICAL ADVICE (OUTPATIENT)
Dept: FAMILY MEDICINE | Facility: CLINIC | Age: 22
End: 2022-07-12

## 2022-07-12 DIAGNOSIS — O26.879 SHORT CERVIX AFFECTING PREGNANCY: Primary | ICD-10-CM

## 2022-07-14 ENCOUNTER — NURSE TRIAGE (OUTPATIENT)
Dept: NURSING | Facility: CLINIC | Age: 22
End: 2022-07-14

## 2022-07-15 ENCOUNTER — TELEPHONE (OUTPATIENT)
Dept: FAMILY MEDICINE | Facility: CLINIC | Age: 22
End: 2022-07-15

## 2022-07-15 NOTE — TELEPHONE ENCOUNTER
Called Gisela to discuss cervical length. She [facility] I explained that her discharge may be just increased discharge of pregnancy. It is odorless and not itchy. She has not had any vaginal bleeding.     She has NOT had any abdominal cramping or tightness, just some pain on the side that she thinks is round ligament pain, and pain in her back. I explained if she DOES developing cramping and a tight painful feeling that is not getting better, she should go in to the hospital to be evaluated. She understands.      Rina Ochoa MD

## 2022-07-15 NOTE — TELEPHONE ENCOUNTER
OB Triage Call      Is patient's OB/Midwife with the [facility]? [facility]- Is patient's primary OB a Midwife? No- Proceed with triage.     Reason for call: vaginal discharge    Assessment: 21w6d passed some vaginal mucous, clear w/very slight pink tinge. Not entire mucous plug - looks like could be part of it. No contractions / abdominal pain. No vaginal bleeding. No gush or constant leakage of fluid. Feeling well otherwise.     Plan: Home care tonight.  Call back if abd pain/contractions, vaginal leakage, bleeding. Pt to call OB doctor tomorrow morning.     Patient's primary OB Provider is Dr Iyer     Per protocol recommendations Patient to follow home care recommendations.      Is patient's delivering hospital on divert? Does not apply due to Patient given home care instructions      21w6d    Estimated Date of Delivery: 2022        OB History    Para Term  AB Living   2 0 0 0 1 0   SAB IAB Ectopic Multiple Live Births   1 0 0 0 0      # Outcome Date GA Lbr Donald/2nd Weight Sex Delivery Anes PTL Lv   2 Current            1 SAB 2021               No results found for: GBS       Phyllis Richmond RN 22 9:17 PM  [facility] Nurse Advisor    Reason for Disposition    Pregnant    Additional Information    Negative: Passed out (i.e., lost consciousness, collapsed and was not responding)    Negative: Shock suspected (e.g., cold/pale/clammy skin, too weak to stand, low BP, rapid pulse)    Negative: Difficult to awaken or acting confused (e.g., disoriented, slurred speech)    Negative: SEVERE vaginal bleeding (e.g., continuous red blood from vagina, large blood clots)    Negative: [1] SEVERE abdominal pain (e.g., excruciating) AND [2] constant AND [3] present > 1 hour    Negative: Sounds like a life-threatening emergency to the triager    Negative: [1] Vaginal bleeding AND [2] pregnant > 20 weeks    Negative: [1] Vaginal bleeding AND [2] pregnant < 20 weeks    Negative:  "[1] Having contractions or other symptoms of labor AND [2] < 37 weeks pregnant (i.e., )    Negative: [1] Having contractions or other symptoms of labor AND [2] > 36 weeks pregnant (i.e., term pregnancy)    Negative: Leakage of fluid (trickle, gush) from the vagina    Negative: Foreign body in vagina (e.g., tampon)    Negative: Pain or burning with passing urine (urination) is main symptom    Negative: [1] Pregnant 23 or more weeks AND [2] baby is moving less today (e.g., kick count < 5 in 1 hour or < 10 in 2 hours)    Negative: Patient sounds very sick or weak to the triager    Negative: [1] Constant abdominal pain AND [2] present > 2 hours    Negative: [1] Intermittent lower abdominal pain AND [2] present > 24 hours    Negative: [1] Pregnant 24-36 weeks () AND [2] pinkish or brownish mucous discharge    Negative: [1] Yellow or green vaginal discharge AND [2] fever    Negative: Painful rash with tiny water blisters    Negative: [1] Rash (e.g., redness, tiny bumps, sore) of genital area AND [2] present > 24 hours    Negative: Abnormal color vaginal discharge (i.e., yellow, green, gray)    Negative: Bad smelling vaginal discharge    Negative: Tender lump (swelling or \"ball\") at vaginal opening    Negative: [1] Symptoms of a \"yeast infection\" (i.e., itchy, white discharge, not bad smelling) AND [2] not improved > 3 days following CARE ADVICE    Negative: Patient is worried about sexually transmitted disease (STD)    Negative: Pain with sexual intercourse (dyspareunia)    Negative: [1] Pregnant > 36 weeks (term) AND [2] pinkish or brownish mucous discharge    Negative: [1] Pregnant > 36 weeks (term) AND [2] passed a small glob or chunk of mucous (may look like gelatin or snot)    Negative: [1] Rash (e.g., redness, tiny bumps, sore) of genital area AND [2] present < 24 hours    Negative: Symptoms of a vaginal yeast infection (i.e., white, thick, cottage-cheese-like, itchy, not bad smelling discharge)    " Normal vaginal discharge in pregnancy    Protocols used: VAGINAL [id number], PREGNANCY - VAGINAL BLEEDING GREATER THAN 20 WEEKS EGA-A-AH, PREGNANCY - VAGINAL [id number]

## 2022-07-17 ENCOUNTER — NURSE TRIAGE (OUTPATIENT)
Dept: NURSING | Facility: CLINIC | Age: 22
End: 2022-07-17

## 2022-07-17 NOTE — TELEPHONE ENCOUNTER
Pt calling in to nurse triage today with concerns about baby's movement. Pt is 22 weeks and 2 days pregnant. PEARL 11/18/22. Pt. Has an anterior placenta placement so she feels the most movement on the sides of her abdomen.Typically baby is very active, especially when Pt. lays down and overnight. Starting last night Pt. Says she has been feeling the baby move much less. Pt. states he usually has a pattern to his movements, baby has not been moving the same. RN had Pt. Drink a glass of water and lay down on her left side to count movements. After about 5-6 minutes there was one felt movement per Pt. RN asked Pt is she believes baby has been moving at least 5 times an hour or 10 times in 2 hours. Pt. states no she does not believe that he has been moving those amounts. Even with some belly rubbing and body movement she does not seem to be able to get him to move more per his usual patterns. Pt will deliver at [facility] in [place]. Disposition to ED/ L&D now. Pt. Is declining/refusing this disposition for now, but will try to start counting movements and if they continue to be low, she reports she will seek care at ED / L&D. RN did state the importance of being seen to evaluate.  Pt. Verbalizes understanding.    Bren Merino RN, MN, PHN on 7/17/2022 at 4:33 PM  [facility]  RN utilized sound nursing judgement based on facility triage protocols during this encounter.               Reason for Disposition    [1] Baby moving less today (e.g., kick count < 5 in 1 hour or < 10 in 2 hours) AND [2] pregnant 23 or more weeks    Additional Information    Negative: Passed out (i.e., lost consciousness, collapsed and was not responding)    Negative: Shock suspected (e.g., cold/pale/clammy skin, too weak to stand, low BP, rapid pulse)    Negative: Difficult to awaken or acting confused (e.g., disoriented, slurred speech)    Negative: [1] SEVERE abdominal pain (e.g., excruciating) AND [2] constant AND [3]  "present > 1 hour    Negative: SEVERE vaginal bleeding (e.g., continuous red blood from vagina, large blood clots)    Negative: Sounds like a life-threatening emergency to the triager    Negative: Followed an abdomen (stomach) injury    Negative: [1] Having contractions or other symptoms of labor (such as vaginal pressure) AND [2] >= 37 weeks pregnant (i.e., term pregnancy)    Negative: [1] Having contractions or other symptoms of labor (such as vaginal pressure) AND [2] < 37 weeks pregnant (i.e., )    Negative: [1] Abdominal pain AND [2] pregnant < 20 weeks    Negative: [1] Vomiting AND [2] contains red blood or black (\"coffee ground\") material  (Exception: few red streaks in vomit that only happened once)    Negative: MODERATE-SEVERE abdominal pain (e.g., interferes with normal activities, awakens from sleep)    Negative: Vaginal bleeding or spotting    Answer Assessment - Initial Assessment Questions  1. LOCATION: \"Where does it hurt?\"       No pain     2. RADIATION: \"Does the pain shoot anywhere else?\" (e.g., chest, back)      No radiation of pain     3. ONSET: \"When did the pain begin?\" (Minutes, hours or days ago)       Stated feeling less baby movement from him overnight in to today     4. ONSET: \"Gradual or sudden onset?\"      Pattern that he moves mostly at night but he is usually very active and not so much overnight and in to today      5. PATTERN: \"Does the pain come and go, or has it been constant since it started?\"       No     6. SEVERITY: \"How bad is the pain?\" \"What does it keep you from doing?\"  (e.g., Scale 1-10; mild, moderate, or severe)    - MILD (1-3): doesn't interfere with normal activities, abdomen soft and not tender to touch     - MODERATE (4-7): interferes with normal activities or awakens from sleep, tender to touch     - SEVERE (8-10): excruciating pain, doubled over, unable to do any normal activities      0/10    7. RECURRENT SYMPTOM: \"Have you ever had this type of abdominal " "pain before?\" If so, ask: \"When was the last time?\" and \"What happened that time?\"      1st time he has not been moving in the way he does normally - more when she lays down     8. CAUSE: \"What do you think is causing the abdominal pain?      No pain     9. RELIEVING/AGGRAVATING FACTORS: \"What makes it better or worse?\" (e.g., antacids, bowel movement, movement)      Has   10. OTHER SYMPTOMS: \"Has there been any vaginal bleeding, fever, vomiting, diarrhea, or urine problems?\"        No other symptoms     11. PEARL: \"What date are you expecting to deliver?\"        11/18/22    Protocols used: PREGNANCY - ABDOMINAL PAIN GREATER THAN 20 WEEKS EGA-A-  COVID 19 Nurse Triage Plan/Patient Instructions    Please be aware that novel coronavirus (COVID-19) may be circulating in the community. If you develop symptoms such as fever, cough, or SOB or if you have concerns about the presence of another infection including coronavirus (COVID-19), please contact your health care provider or visit [email or web address].     Disposition/Instructions    ED Visit recommended. Follow protocol based instructions.     Bring Your Own Device:  Please also bring your smart device(s) (smart phones, tablets, laptops) and their charging cables for your personal use and to communicate with your care team during your visit.    Thank you for taking steps to prevent the spread of this virus.  o Limit your contact with others.  o Wear a simple mask to cover your cough.  o Wash your hands well and often.    Resources    [facility]: About COVID-19: [email or web address]    CDC: What to Do If You're Sick: www.cdc.gov/coronavirus/2019-ncov/about/steps-when-sick.html    CDC: Ending Home Isolation: www.cdc.gov/coronavirus/2019-ncov/hcp/disposition-in-home-patients.html     CDC: Caring for Someone: www.cdc.gov/coronavirus/2019-ncov/if-you-are-sick/care-for-someone.html     MD: Interim Guidance for Hospital Discharge to Home: " www.health.[facility].mn.us/diseases/coronavirus/hcp/hospdischarge.pdf    [facility] clinical trials (COVID-19 research studies): [email or web address]-trials     Below are the COVID-19 hotlines at the [facility] of Health ([facility]). Interpreters are available.   o For health questions: Call 841-698-5180 or 1-668.341.3169 (7 a.m. to 7 p.m.)  o For questions about schools and childcare: Call 748-798-0037 or 1-616.216.5739 (7 a.m. to 7 p.m.)

## 2022-07-18 ENCOUNTER — MYC MEDICAL ADVICE (OUTPATIENT)
Dept: FAMILY MEDICINE | Facility: CLINIC | Age: 22
End: 2022-07-18

## 2022-07-18 NOTE — TELEPHONE ENCOUNTER
Dr. Ochoa  I know you're not covering for Dr. Iyer but no other OB providers in clinic beside you today.  A patient of Dr. Iyer message via [facility] to report feeling less movement from fetal since Saturday evening and quite concern.  I attempt to assist her in scheduling an appt with Dr. Chahal at  but patient is unable to come in today.  Please advise    Thank you    CLARIBEL BookerN, RN  [facility]

## 2022-07-18 NOTE — TELEPHONE ENCOUNTER
Patient called back wanting to know if there is any available appointment with a provider in clinic tomorrow after 4pm.  RN checked for OB availability at [facility] and [facility] with no openings. Patient said she has no ride to appointment and will wait to go to an US appointment this Wednesday.  RN advised to seek care at ER/UC to evaluate fetal well-being after spouse gets home.  Patient agreed.  RN informed patient her [employer] message had been forwarded to Dr. Ochoa for review and will get back to her with provider recommendation once heard back.        J Luis Wheatley, CLARIBELN, RN  [facility]

## 2022-07-18 NOTE — TELEPHONE ENCOUNTER
Called Gisela.     Explained that at 22 weeks, it is very normal to feel the baby move less on some days than on others since this is very dependent on the baby's position. Since she is still feeling some movement several times per day, this is reassuring. I explained I do not feel she needs to go in for extra evaluation before her ultrasound scheduled in 2 days.     She understands and was reassured.     Rina Ochoa MD

## 2022-07-20 ENCOUNTER — TRANSFERRED RECORDS (OUTPATIENT)
Dept: HEALTH INFORMATION MANAGEMENT | Facility: CLINIC | Age: 22
End: 2022-07-20

## 2022-07-24 ENCOUNTER — MYC MEDICAL ADVICE (OUTPATIENT)
Dept: FAMILY MEDICINE | Facility: CLINIC | Age: 22
End: 2022-07-24

## 2022-07-25 NOTE — TELEPHONE ENCOUNTER
Called patient to discuss.    Offered reassurance. Denies any cramping or back pain or vaginal spotting/bleeding. She had recent cervical length US at [facility], cervical length 2.6 (previously was 2.5 2 weeks prior). No further testing required at this time- reviewed warning signs.     Follow-up as previously scheduled for routine OB check.

## 2022-08-04 ENCOUNTER — TELEPHONE (OUTPATIENT)
Dept: NURSING | Facility: CLINIC | Age: 22
End: 2022-08-04

## 2022-08-04 ENCOUNTER — MYC MEDICAL ADVICE (OUTPATIENT)
Dept: FAMILY MEDICINE | Facility: CLINIC | Age: 22
End: 2022-08-04

## 2022-08-04 DIAGNOSIS — K59.00 CONSTIPATION, UNSPECIFIED CONSTIPATION TYPE: Primary | ICD-10-CM

## 2022-08-04 NOTE — TELEPHONE ENCOUNTER
Patient called about her constipation.  I see from notes in her chart that advice was given and her message forwarded to one of the OB providers.      She said she is doing better now.  I reminded her, as did a note earlier, of the importance of staying well hydrated, staying active and eating a diet high in fiber.  She stated understanding and agreement.    She will wait to hear back from the clinic once the provider has addressed her message.    Macarena OLIVAS RN [facility]

## 2022-08-04 NOTE — TELEPHONE ENCOUNTER
Dr. Ochoa,   Please review OB patient of Dr. Iyer message via [employer] regarding constipation and treatment.      Please advise.    Thank you    CLARIBEL BookerN, RN  [facility]

## 2022-08-05 RX ORDER — POLYETHYLENE GLYCOL 3350 17 G/17G
1 POWDER, FOR SOLUTION ORAL DAILY
Qty: 507 G | Refills: 3 | Status: SHIPPED | OUTPATIENT
Start: 2022-08-05 | End: 2022-09-02

## 2022-08-12 ENCOUNTER — PRENATAL OFFICE VISIT (OUTPATIENT)
Dept: FAMILY MEDICINE | Facility: CLINIC | Age: 22
End: 2022-08-12
Payer: COMMERCIAL

## 2022-08-12 VITALS
HEART RATE: 99 BPM | RESPIRATION RATE: 20 BRPM | DIASTOLIC BLOOD PRESSURE: 64 MMHG | OXYGEN SATURATION: 99 % | TEMPERATURE: 97.7 F | HEIGHT: 60 IN | BODY MASS INDEX: 27.48 KG/M2 | SYSTOLIC BLOOD PRESSURE: 100 MMHG | WEIGHT: 140 LBS

## 2022-08-12 DIAGNOSIS — O99.019 ANTEPARTUM ANEMIA: Primary | ICD-10-CM

## 2022-08-12 DIAGNOSIS — Z34.90 PREGNANCY, UNSPECIFIED GESTATIONAL AGE: Primary | ICD-10-CM

## 2022-08-12 LAB
GLUCOSE 1H P 50 G GLC PO SERPL-MCNC: 105 MG/DL (ref 70–129)
HGB BLD-MCNC: 10.2 G/DL (ref 11.7–15.7)
T PALLIDUM AB SER QL: NONREACTIVE

## 2022-08-12 PROCEDURE — 85018 HEMOGLOBIN: CPT | Performed by: FAMILY MEDICINE

## 2022-08-12 PROCEDURE — 36415 COLL VENOUS BLD VENIPUNCTURE: CPT | Performed by: FAMILY MEDICINE

## 2022-08-12 PROCEDURE — 99207 PR PRENATAL VISIT: CPT | Performed by: FAMILY MEDICINE

## 2022-08-12 PROCEDURE — 82950 GLUCOSE TEST: CPT | Performed by: FAMILY MEDICINE

## 2022-08-12 PROCEDURE — 86780 TREPONEMA PALLIDUM: CPT | Performed by: FAMILY MEDICINE

## 2022-08-12 RX ORDER — FERROUS SULFATE 325(65) MG
325 TABLET ORAL
Qty: 90 TABLET | Refills: 2 | Status: SHIPPED | OUTPATIENT
Start: 2022-08-12 | End: 2022-09-16

## 2022-08-12 NOTE — PROGRESS NOTES
"SUBJECTIVE:   at 26w0d. Estimated Date of Delivery: 2022.  She is doing well. She denies severe abdominal pain/cramping, vaginal bleeding, leakage of fluid. Fetal movement is present.   Concerns: will she get her iron levels rechecked today, has questions about visitors- would like her partner and MIL to be present  ROS  Negative except as noted above in HPI.  OBJECTIVE:  Blood pressure 100/64, pulse 99, temperature 97.7  F (36.5  C), temperature source Oral, resp. rate 20, height 1.53 m (5' 0.25\"), weight 63.5 kg (140 lb), last menstrual period 2022, SpO2 99 %, not currently breastfeeding.  Gen: comfortable, no acute distress.  See OB Vitals flowsheet.  ASSESSMENT/PLAN:  Gisela Fuller was seen today for prenatal care.    Diagnoses and all orders for this visit:    Pregnancy, unspecified gestational age  -     Glucose tolerance, gest screen, 1 hour; Future  -     Treponema Abs w Reflex to RPR and Titer; Future  -     Hemoglobin; Future      -IUP at 26w0d:     Prenatal labs reviewed     GTT today    Reviewed pain options in labor. Plans unmediated birth. Reviewed  support- gave [employer] resource    RTC in 2 weeks or sooner with problems.      Cassie Iyer MD    "

## 2022-09-02 ENCOUNTER — PRENATAL OFFICE VISIT (OUTPATIENT)
Dept: FAMILY MEDICINE | Facility: CLINIC | Age: 22
End: 2022-09-02
Payer: COMMERCIAL

## 2022-09-02 VITALS
TEMPERATURE: 97.8 F | HEART RATE: 85 BPM | SYSTOLIC BLOOD PRESSURE: 96 MMHG | HEIGHT: 60 IN | DIASTOLIC BLOOD PRESSURE: 58 MMHG | BODY MASS INDEX: 27.63 KG/M2 | OXYGEN SATURATION: 100 % | WEIGHT: 140.75 LBS

## 2022-09-02 DIAGNOSIS — Z34.90 PREGNANCY, UNSPECIFIED GESTATIONAL AGE: Primary | ICD-10-CM

## 2022-09-02 PROCEDURE — 90715 TDAP VACCINE 7 YRS/> IM: CPT | Performed by: FAMILY MEDICINE

## 2022-09-02 PROCEDURE — 99207 PR PRENATAL VISIT: CPT | Performed by: FAMILY MEDICINE

## 2022-09-02 PROCEDURE — 90471 IMMUNIZATION ADMIN: CPT | Performed by: FAMILY MEDICINE

## 2022-09-02 NOTE — PROGRESS NOTES
SUBJECTIVE:   at 29w0d by 7 week US. Estimated Date of Delivery: 2022.  She is doing well. She denies vaginal bleeding, leakage of fluid, or urinary symptoms. Fetal movement is present.   Concerns: Has some tyson-thurman contractions- resolve when she drinks more water and rests. Questions about whether she can have probiotic yogurt. She had some vaginal irritation/itching and redness post-intercourse that resolved.   ROS  Negative except as noted above in HPI  OBJECTIVE:  Blood pressure 96/58, pulse 85, temperature 97.8  F (36.6  C), height 1.524 m (5'), weight 63.8 kg (140 lb 12 oz), last menstrual period 2022, SpO2 100 %, not currently breastfeeding.  Gen: Comfortable, no acute distress.  Abd: Gravid, non-tender  See OB Vitals flowsheet.  ASSESSMENT/PLAN:  Gisela Fuller was seen today for prenatal care.    Diagnoses and all orders for this visit:    Pregnancy, unspecified gestational age  -     TDAP VACCINE (Adacel, Boostrix)  [[id number]]      -IUP at 29w0d:     Prenatal labs reviewed     RTC in 2 weeks or sooner with problems. Bedside US next visit      Cassie Iyer MD

## 2022-09-16 ENCOUNTER — PRENATAL OFFICE VISIT (OUTPATIENT)
Dept: FAMILY MEDICINE | Facility: CLINIC | Age: 22
End: 2022-09-16
Payer: COMMERCIAL

## 2022-09-16 VITALS
SYSTOLIC BLOOD PRESSURE: 98 MMHG | RESPIRATION RATE: 12 BRPM | HEART RATE: 77 BPM | OXYGEN SATURATION: 99 % | WEIGHT: 144.25 LBS | DIASTOLIC BLOOD PRESSURE: 66 MMHG | TEMPERATURE: 97.6 F | HEIGHT: 60 IN | BODY MASS INDEX: 28.32 KG/M2

## 2022-09-16 DIAGNOSIS — O99.019 ANTEPARTUM ANEMIA: ICD-10-CM

## 2022-09-16 PROCEDURE — 99207 PR PRENATAL VISIT: CPT | Performed by: FAMILY MEDICINE

## 2022-09-16 RX ORDER — FERROUS SULFATE 325(65) MG
325 TABLET ORAL
Qty: 90 TABLET | Refills: 2 | Status: SHIPPED | OUTPATIENT
Start: 2022-09-16 | End: 2022-10-14

## 2022-09-16 NOTE — PROGRESS NOTES
SUBJECTIVE:   at 31w0d. Estimated Date of Delivery: 2022.  She is doing well. She denies vaginal bleeding, leakage of fluid, or urinary symptoms. Fetal movement is prseent. She is not having any painful or regular contractions  Concerns: Trip planned to [place] -- wants to wean breastfeeding before the trip. She was planning on breastfeeding for 1 month.   ROS  Negative except as noted above in HPI  OBJECTIVE:  Blood pressure 98/66, pulse 77, temperature 97.6  F (36.4  C), temperature source Oral, resp. rate 12, height 1.524 m (5'), weight 65.4 kg (144 lb 4 oz), last menstrual period 2022, SpO2 99 %, not currently breastfeeding.  Gen: Comfortable, no acute distress.  Abd: Gravid, non-tender  See OB Vitals flowsheet.  Bedside US: transverse, maternal head left  ASSESSMENT/PLAN:  Gisela Fuller was seen today for prenatal care.    Diagnoses and all orders for this visit:    Antepartum anemia: Was unable to  ferrous sulfate- I sent new prescription.   -     ferrous sulfate (FEROSUL) 325 (65 Fe) MG tablet; Take 1 tablet (325 mg) by mouth daily (with breakfast)      -IUP at 31w0d:     Prenatal labs reviewed    RTC in 2 weeks or sooner with problems. Bedside US for positioning, check hemoglobin in 2-4 weeks.        Cassie Iyer MD

## 2022-09-30 ENCOUNTER — PRENATAL OFFICE VISIT (OUTPATIENT)
Dept: FAMILY MEDICINE | Facility: CLINIC | Age: 22
End: 2022-09-30
Payer: COMMERCIAL

## 2022-09-30 VITALS
BODY MASS INDEX: 27.95 KG/M2 | WEIGHT: 148.04 LBS | HEART RATE: 77 BPM | OXYGEN SATURATION: 98 % | RESPIRATION RATE: 10 BRPM | SYSTOLIC BLOOD PRESSURE: 106 MMHG | DIASTOLIC BLOOD PRESSURE: 67 MMHG | TEMPERATURE: 98.2 F | HEIGHT: 61 IN

## 2022-09-30 DIAGNOSIS — Z34.90 PREGNANCY, UNSPECIFIED GESTATIONAL AGE: Primary | ICD-10-CM

## 2022-09-30 DIAGNOSIS — N89.8 VAGINAL DISCHARGE: ICD-10-CM

## 2022-09-30 LAB
CLUE CELLS: ABNORMAL
TRICHOMONAS, WET PREP: ABNORMAL
WBC'S/HIGH POWER FIELD, WET PREP: ABNORMAL
YEAST, WET PREP: ABNORMAL

## 2022-09-30 PROCEDURE — 87210 SMEAR WET MOUNT SALINE/INK: CPT | Performed by: FAMILY MEDICINE

## 2022-09-30 PROCEDURE — 99207 PR PRENATAL VISIT: CPT | Performed by: FAMILY MEDICINE

## 2022-09-30 NOTE — PROGRESS NOTES
"SUBJECTIVE:   at 33w0d. Estimated Date of Delivery: 2022.  She is doing well. She denies vaginal bleeding, leakage of fluid, or urinary symptoms. Fetal movement is present.   Concerns: she reports had orgasm 2 days ago and had some uterine cramping, tightening and back pain since that time. Denies any painful or regular contractions. Also reports some increased vaginal discharge with itching.  ROS  Negative except as noted above in HPI  OBJECTIVE:  Blood pressure 106/67, pulse 77, temperature 98.2  F (36.8  C), temperature source Oral, resp. rate 10, height 1.556 m (5' 1.25\"), weight 67.2 kg (148 lb 0.6 oz), last menstrual period 2022, SpO2 98 %, not currently breastfeeding.  Gen: Comfortable, no acute distress.  Abd: Gravid, non-tender  See OB Vitals flowsheet.  ASSESSMENT/PLAN:  Gisela Fuller was seen today for prenatal care.    Diagnoses and all orders for this visit:    Pregnancy, unspecified gestational age    Vaginal discharge  -     Wet prep - Clinic Collect      -IUP at 33w0d:     Prenatal labs reviewed     Reviewed flu shot- declines    RTC in 2 weeks or sooner with problems.        Cassie Iyer MD    "

## 2022-10-01 ENCOUNTER — HOSPITAL ENCOUNTER (INPATIENT)
Facility: HOSPITAL | Age: 22
LOS: 2 days | Discharge: HOME | End: 2022-10-03
Attending: FAMILY MEDICINE | Admitting: FAMILY MEDICINE
Payer: COMMERCIAL

## 2022-10-01 PROBLEM — O60.00 PRETERM LABOR: Status: ACTIVE | Noted: 2022-10-01

## 2022-10-01 LAB
ABO/RH(D): NORMAL
ALBUMIN UR-MCNC: NEGATIVE MG/DL
AMPHETAMINES UR QL SCN: NORMAL
ANTIBODY SCREEN: NEGATIVE
APPEARANCE UR: CLEAR
BARBITURATES UR QL SCN: NORMAL
BENZODIAZ UR QL SCN: NORMAL
BILIRUB UR QL STRIP: NEGATIVE
BZE UR QL SCN: NORMAL
CANNABINOIDS UR QL SCN: NORMAL
COLOR UR AUTO: COLORLESS
ERYTHROCYTE [DISTWIDTH] IN BLOOD BY AUTOMATED COUNT: 13.1 % (ref 10–15)
GLUCOSE UR STRIP-MCNC: NEGATIVE MG/DL
HCT VFR BLD AUTO: 33.7 % (ref 35–47)
HGB BLD-MCNC: 11.2 G/DL (ref 11.7–15.7)
HGB UR QL STRIP: ABNORMAL
KETONES UR STRIP-MCNC: NEGATIVE MG/DL
LEUKOCYTE ESTERASE UR QL STRIP: ABNORMAL
MCH RBC QN AUTO: 29.9 PG (ref 26.5–33)
MCHC RBC AUTO-ENTMCNC: 33.2 G/DL (ref 31.5–36.5)
MCV RBC AUTO: 90 FL (ref 78–100)
NITRATE UR QL: NEGATIVE
OPIATES UR QL SCN: NORMAL
PCP QUAL URINE (ROCHE): NORMAL
PH UR STRIP: 6.5 [PH] (ref 5–7)
PLATELET # BLD AUTO: 153 10E3/UL (ref 150–450)
RBC # BLD AUTO: 3.75 10E6/UL (ref 3.8–5.2)
RBC URINE: 1 /HPF
SARS-COV-2 RNA RESP QL NAA+PROBE: NEGATIVE
SP GR UR STRIP: 1.01 (ref 1–1.03)
SPECIMEN EXPIRATION DATE: NORMAL
SQUAMOUS EPITHELIAL: <1 /HPF
T PALLIDUM AB SER QL: NONREACTIVE
UROBILINOGEN UR STRIP-MCNC: <2 MG/DL
WBC # BLD AUTO: 11.5 10E3/UL (ref 4–11)
WBC URINE: 5 /HPF

## 2022-10-01 PROCEDURE — 250N000013 HC RX MED GY IP 250 OP 250 PS 637: Performed by: FAMILY MEDICINE

## 2022-10-01 PROCEDURE — U0005 INFEC AGEN DETEC AMPLI PROBE: HCPCS | Performed by: FAMILY MEDICINE

## 2022-10-01 PROCEDURE — 258N000003 HC RX IP 258 OP 636: Performed by: FAMILY MEDICINE

## 2022-10-01 PROCEDURE — 120N000001 HC R&B MED SURG/OB

## 2022-10-01 PROCEDURE — 87086 URINE CULTURE/COLONY COUNT: CPT | Performed by: FAMILY MEDICINE

## 2022-10-01 PROCEDURE — 99232 SBSQ HOSP IP/OBS MODERATE 35: CPT | Performed by: CLINICAL NURSE SPECIALIST

## 2022-10-01 PROCEDURE — 80307 DRUG TEST PRSMV CHEM ANLYZR: CPT | Performed by: FAMILY MEDICINE

## 2022-10-01 PROCEDURE — 85027 COMPLETE CBC AUTOMATED: CPT | Performed by: FAMILY MEDICINE

## 2022-10-01 PROCEDURE — 250N000011 HC RX IP 250 OP 636: Performed by: FAMILY MEDICINE

## 2022-10-01 PROCEDURE — 86780 TREPONEMA PALLIDUM: CPT | Performed by: FAMILY MEDICINE

## 2022-10-01 PROCEDURE — 86901 BLOOD TYPING SEROLOGIC RH(D): CPT | Performed by: FAMILY MEDICINE

## 2022-10-01 PROCEDURE — 86850 RBC ANTIBODY SCREEN: CPT | Performed by: FAMILY MEDICINE

## 2022-10-01 PROCEDURE — 81001 URINALYSIS AUTO W/SCOPE: CPT | Performed by: FAMILY MEDICINE

## 2022-10-01 PROCEDURE — 36415 COLL VENOUS BLD VENIPUNCTURE: CPT | Performed by: FAMILY MEDICINE

## 2022-10-01 PROCEDURE — 87653 STREP B DNA AMP PROBE: CPT | Performed by: FAMILY MEDICINE

## 2022-10-01 RX ORDER — BETAMETHASONE SODIUM PHOSPHATE AND BETAMETHASONE ACETATE 3; 3 MG/ML; MG/ML
12 INJECTION, SUSPENSION INTRA-ARTICULAR; INTRALESIONAL; INTRAMUSCULAR; SOFT TISSUE EVERY 24 HOURS
Status: DISCONTINUED | OUTPATIENT
Start: 2022-10-01 | End: 2022-10-01 | Stop reason: HOSPADM

## 2022-10-01 RX ORDER — KETOROLAC TROMETHAMINE 30 MG/ML
30 INJECTION, SOLUTION INTRAMUSCULAR; INTRAVENOUS
Status: DISCONTINUED | OUTPATIENT
Start: 2022-10-01 | End: 2022-10-03 | Stop reason: HOSPADM

## 2022-10-01 RX ORDER — OXYTOCIN/0.9 % SODIUM CHLORIDE 30/500 ML
340 PLASTIC BAG, INJECTION (ML) INTRAVENOUS CONTINUOUS PRN
Status: DISCONTINUED | OUTPATIENT
Start: 2022-10-01 | End: 2022-10-03 | Stop reason: HOSPADM

## 2022-10-01 RX ORDER — HYDROXYZINE HYDROCHLORIDE 50 MG/1
50 TABLET, FILM COATED ORAL
Status: DISCONTINUED | OUTPATIENT
Start: 2022-10-01 | End: 2022-10-03 | Stop reason: HOSPADM

## 2022-10-01 RX ORDER — PENICILLIN G 3000000 [IU]/50ML
3 INJECTION, SOLUTION INTRAVENOUS EVERY 4 HOURS
Status: DISCONTINUED | OUTPATIENT
Start: 2022-10-01 | End: 2022-10-01 | Stop reason: HOSPADM

## 2022-10-01 RX ORDER — NALOXONE HYDROCHLORIDE 0.4 MG/ML
0.4 INJECTION, SOLUTION INTRAMUSCULAR; INTRAVENOUS; SUBCUTANEOUS
Status: DISCONTINUED | OUTPATIENT
Start: 2022-10-01 | End: 2022-10-03 | Stop reason: HOSPADM

## 2022-10-01 RX ORDER — NALOXONE HYDROCHLORIDE 0.4 MG/ML
0.2 INJECTION, SOLUTION INTRAMUSCULAR; INTRAVENOUS; SUBCUTANEOUS
Status: DISCONTINUED | OUTPATIENT
Start: 2022-10-01 | End: 2022-10-03 | Stop reason: HOSPADM

## 2022-10-01 RX ORDER — METOCLOPRAMIDE HYDROCHLORIDE 5 MG/ML
10 INJECTION INTRAMUSCULAR; INTRAVENOUS EVERY 6 HOURS PRN
Status: DISCONTINUED | OUTPATIENT
Start: 2022-10-01 | End: 2022-10-03 | Stop reason: HOSPADM

## 2022-10-01 RX ORDER — OXYTOCIN 10 [USP'U]/ML
10 INJECTION, SOLUTION INTRAMUSCULAR; INTRAVENOUS
Status: DISCONTINUED | OUTPATIENT
Start: 2022-10-01 | End: 2022-10-03 | Stop reason: HOSPADM

## 2022-10-01 RX ORDER — ONDANSETRON 4 MG/1
4 TABLET, ORALLY DISINTEGRATING ORAL EVERY 6 HOURS PRN
Status: DISCONTINUED | OUTPATIENT
Start: 2022-10-01 | End: 2022-10-03 | Stop reason: HOSPADM

## 2022-10-01 RX ORDER — TERBUTALINE SULFATE 1 MG/ML
INJECTION, SOLUTION SUBCUTANEOUS
Status: DISCONTINUED
Start: 2022-10-01 | End: 2022-10-01 | Stop reason: WASHOUT

## 2022-10-01 RX ORDER — NIFEDIPINE 10 MG/1
20 CAPSULE ORAL ONCE
Status: COMPLETED | OUTPATIENT
Start: 2022-10-01 | End: 2022-10-01

## 2022-10-01 RX ORDER — OXYTOCIN/0.9 % SODIUM CHLORIDE 30/500 ML
100-340 PLASTIC BAG, INJECTION (ML) INTRAVENOUS CONTINUOUS PRN
Status: DISCONTINUED | OUTPATIENT
Start: 2022-10-01 | End: 2022-10-03 | Stop reason: HOSPADM

## 2022-10-01 RX ORDER — PENICILLIN G POTASSIUM 5000000 [IU]/1
5 INJECTION, POWDER, FOR SOLUTION INTRAMUSCULAR; INTRAVENOUS ONCE
Status: COMPLETED | OUTPATIENT
Start: 2022-10-01 | End: 2022-10-01

## 2022-10-01 RX ORDER — PENICILLIN G 3000000 [IU]/50ML
3 INJECTION, SOLUTION INTRAVENOUS EVERY 4 HOURS
Status: DISCONTINUED | OUTPATIENT
Start: 2022-10-01 | End: 2022-10-02

## 2022-10-01 RX ORDER — BETAMETHASONE SODIUM PHOSPHATE AND BETAMETHASONE ACETATE 3; 3 MG/ML; MG/ML
12 INJECTION, SUSPENSION INTRA-ARTICULAR; INTRALESIONAL; INTRAMUSCULAR; SOFT TISSUE EVERY 24 HOURS
Status: COMPLETED | OUTPATIENT
Start: 2022-10-01 | End: 2022-10-02

## 2022-10-01 RX ORDER — AMPICILLIN 2 G/1
2 INJECTION, POWDER, FOR SOLUTION INTRAVENOUS EVERY 6 HOURS
Status: DISCONTINUED | OUTPATIENT
Start: 2022-10-01 | End: 2022-10-01

## 2022-10-01 RX ORDER — PROCHLORPERAZINE MALEATE 10 MG
10 TABLET ORAL EVERY 6 HOURS PRN
Status: DISCONTINUED | OUTPATIENT
Start: 2022-10-01 | End: 2022-10-03 | Stop reason: HOSPADM

## 2022-10-01 RX ORDER — MISOPROSTOL 200 UG/1
800 TABLET ORAL
Status: DISCONTINUED | OUTPATIENT
Start: 2022-10-01 | End: 2022-10-03 | Stop reason: HOSPADM

## 2022-10-01 RX ORDER — CITRIC ACID/SODIUM CITRATE 334-500MG
30 SOLUTION, ORAL ORAL
Status: DISCONTINUED | OUTPATIENT
Start: 2022-10-01 | End: 2022-10-03 | Stop reason: HOSPADM

## 2022-10-01 RX ORDER — MISOPROSTOL 200 UG/1
400 TABLET ORAL
Status: DISCONTINUED | OUTPATIENT
Start: 2022-10-01 | End: 2022-10-03 | Stop reason: HOSPADM

## 2022-10-01 RX ORDER — AMOXICILLIN 250 MG/1
250 CAPSULE ORAL 3 TIMES DAILY
Status: DISCONTINUED | OUTPATIENT
Start: 2022-10-03 | End: 2022-10-01

## 2022-10-01 RX ORDER — CARBOPROST TROMETHAMINE 250 UG/ML
250 INJECTION, SOLUTION INTRAMUSCULAR
Status: DISCONTINUED | OUTPATIENT
Start: 2022-10-01 | End: 2022-10-03 | Stop reason: HOSPADM

## 2022-10-01 RX ORDER — PENICILLIN G POTASSIUM 5000000 [IU]/1
5 INJECTION, POWDER, FOR SOLUTION INTRAMUSCULAR; INTRAVENOUS ONCE
Status: DISCONTINUED | OUTPATIENT
Start: 2022-10-01 | End: 2022-10-01 | Stop reason: HOSPADM

## 2022-10-01 RX ORDER — ONDANSETRON 2 MG/ML
4 INJECTION INTRAMUSCULAR; INTRAVENOUS EVERY 6 HOURS PRN
Status: DISCONTINUED | OUTPATIENT
Start: 2022-10-01 | End: 2022-10-03 | Stop reason: HOSPADM

## 2022-10-01 RX ORDER — AZITHROMYCIN 250 MG/1
1000 TABLET, FILM COATED ORAL ONCE
Status: DISCONTINUED | OUTPATIENT
Start: 2022-10-01 | End: 2022-10-01

## 2022-10-01 RX ORDER — NIFEDIPINE 10 MG/1
20 CAPSULE ORAL EVERY 30 MIN PRN
Status: DISCONTINUED | OUTPATIENT
Start: 2022-10-01 | End: 2022-10-01 | Stop reason: HOSPADM

## 2022-10-01 RX ORDER — NIFEDIPINE 10 MG/1
20 CAPSULE ORAL EVERY 30 MIN PRN
Status: DISCONTINUED | OUTPATIENT
Start: 2022-10-01 | End: 2022-10-03 | Stop reason: HOSPADM

## 2022-10-01 RX ORDER — IBUPROFEN 800 MG/1
800 TABLET, FILM COATED ORAL
Status: DISCONTINUED | OUTPATIENT
Start: 2022-10-01 | End: 2022-10-03 | Stop reason: HOSPADM

## 2022-10-01 RX ORDER — PROCHLORPERAZINE 25 MG
25 SUPPOSITORY, RECTAL RECTAL EVERY 12 HOURS PRN
Status: DISCONTINUED | OUTPATIENT
Start: 2022-10-01 | End: 2022-10-03 | Stop reason: HOSPADM

## 2022-10-01 RX ORDER — NIFEDIPINE 10 MG/1
20 CAPSULE ORAL ONCE
Status: DISCONTINUED | OUTPATIENT
Start: 2022-10-01 | End: 2022-10-01 | Stop reason: HOSPADM

## 2022-10-01 RX ORDER — NIFEDIPINE 10 MG/1
10 CAPSULE ORAL
Status: DISCONTINUED | OUTPATIENT
Start: 2022-10-01 | End: 2022-10-03

## 2022-10-01 RX ORDER — METHYLERGONOVINE MALEATE 0.2 MG/ML
200 INJECTION INTRAVENOUS
Status: DISCONTINUED | OUTPATIENT
Start: 2022-10-01 | End: 2022-10-03 | Stop reason: HOSPADM

## 2022-10-01 RX ORDER — LIDOCAINE 40 MG/G
CREAM TOPICAL
Status: DISCONTINUED | OUTPATIENT
Start: 2022-10-01 | End: 2022-10-01 | Stop reason: HOSPADM

## 2022-10-01 RX ORDER — METOCLOPRAMIDE 10 MG/1
10 TABLET ORAL EVERY 6 HOURS PRN
Status: DISCONTINUED | OUTPATIENT
Start: 2022-10-01 | End: 2022-10-03 | Stop reason: HOSPADM

## 2022-10-01 RX ADMIN — SODIUM CHLORIDE, POTASSIUM CHLORIDE, SODIUM LACTATE AND CALCIUM CHLORIDE 500 ML: 600; 310; 30; 20 INJECTION, SOLUTION INTRAVENOUS at 04:05

## 2022-10-01 RX ADMIN — PENICILLIN G 3 MILLION UNITS: 3000000 INJECTION, SOLUTION INTRAVENOUS at 13:30

## 2022-10-01 RX ADMIN — PENICILLIN G POTASSIUM 5 MILLION UNITS: 5000000 POWDER, FOR SOLUTION INTRAMUSCULAR; INTRAPLEURAL; INTRATHECAL; INTRAVENOUS at 05:07

## 2022-10-01 RX ADMIN — PENICILLIN G 3 MILLION UNITS: 3000000 INJECTION, SOLUTION INTRAVENOUS at 22:21

## 2022-10-01 RX ADMIN — BETAMETHASONE SODIUM PHOSPHATE AND BETAMETHASONE ACETATE 12 MG: 3; 3 INJECTION, SUSPENSION INTRA-ARTICULAR; INTRALESIONAL; INTRAMUSCULAR at 04:00

## 2022-10-01 RX ADMIN — NIFEDIPINE 10 MG: 10 CAPSULE ORAL at 17:40

## 2022-10-01 RX ADMIN — NIFEDIPINE 10 MG: 10 CAPSULE ORAL at 10:02

## 2022-10-01 RX ADMIN — NIFEDIPINE 10 MG: 10 CAPSULE ORAL at 22:21

## 2022-10-01 RX ADMIN — NIFEDIPINE 20 MG: 10 CAPSULE ORAL at 07:59

## 2022-10-01 RX ADMIN — NIFEDIPINE 10 MG: 10 CAPSULE ORAL at 14:14

## 2022-10-01 RX ADMIN — PENICILLIN G 3 MILLION UNITS: 3000000 INJECTION, SOLUTION INTRAVENOUS at 09:14

## 2022-10-01 RX ADMIN — NIFEDIPINE 20 MG: 10 CAPSULE ORAL at 03:56

## 2022-10-01 RX ADMIN — PENICILLIN G 3 MILLION UNITS: 3000000 INJECTION, SOLUTION INTRAVENOUS at 18:14

## 2022-10-01 ASSESSMENT — ACTIVITIES OF DAILY LIVING (ADL)
ADLS_ACUITY_SCORE: 18
CHANGE_IN_FUNCTIONAL_STATUS_SINCE_ONSET_OF_CURRENT_ILLNESS/INJURY: NO
ADLS_ACUITY_SCORE: 31
ADLS_ACUITY_SCORE: 18
ADLS_ACUITY_SCORE: 31
ADLS_ACUITY_SCORE: 18

## 2022-10-01 NOTE — LETTER
[facility]  [address] [place] [address]  Phone: [phone]  Fax: [phone]    October 3, 2022        Gisela Moon  [address] [place] [address]          To whom it may concern:    RE: Gisela Moon Mai has complications of her pregnancy (due date 11/18/22). She was hospitalized 10/1-10/3/22 at [facility]. She will need to remain at home for the duration of her pregnancy and will not be able to resume in-person course activities. She is able to complete virtual activities for school, if that can be accommodated.     Please contact me for questions or concerns.      Sincerely,        Cassie Iyer MD

## 2022-10-01 NOTE — LETTER
[address] [address]  Phone: [phone]  Fax: [phone]    October 1, 2022        Gisela Moon  [address] [place] [address]          To whom it may concern:    RE: Gisela Moon    Patient has been admitted to hospital on 10/1/22 for complications of pregnancy. Please excuse her from school at this time.    Please contact me for questions or concerns.      Sincerely,        Cassie Iyer MD

## 2022-10-01 NOTE — CONSULTS
[facility] Labor and Delivery History and Physical    Gisela Moon MRN# [id number]   Age: 22 year old YOB: 2000     Date of Admission:  10/1/2022    Primary care provider: Cassie Iyer           Chief Complaint:   Gisela Moon is a 22 year old female who is 33w1d pregnant and being admitted for  labor.  Patient is 3 cm on admission.  I was asked as in house OB to consult regarding tocolysis recommendations.  Patient endorses cramping and spotting in last 2 days, now increasing in intensity and frequency- moderate contractions every 4 to 5 minutes.  Pregnancy complicated by known short cervix at 2.5/2.6 cm.          Pregnancy history:     OBSTETRIC HISTORY:    OB History    Para Term  AB Living   2 0 0 0 1 0   SAB IAB Ectopic Multiple Live Births   1 0 0 0 0      # Outcome Date GA Lbr Donald/2nd Weight Sex Delivery Anes PTL Lv   2 Current            1 SAB 2021               EDC: Estimated Date of Delivery: 22    Prenatal Labs:   Lab Results   Component Value Date    AS Negative 2022    HEPBANG Nonreactive 2022    CHPCRT Negative 2022    GCPCRT Negative 2022    HGB 11.2 (L) 10/01/2022       GBS Status:   No results found for: GBS    Active Problem List  Patient Active Problem List   Diagnosis     PCOS (polycystic ovarian syndrome)     Pregnancy, unspecified gestational age      labor       Medication Prior to Admission  Medications Prior to Admission   Medication Sig Dispense Refill Last Dose     ferrous sulfate (FEROSUL) 325 (65 Fe) MG tablet Take 1 tablet (325 mg) by mouth daily (with breakfast) 90 tablet 2 2022 at Unknown time     Prenatal Vit-Fe Fumarate-FA (PRENATAL PLUS) 27-1 MG TABS Take 1 tablet by mouth    2022 at Unknown time   .        Maternal Past Medical History:     Past Medical History:   Diagnosis Date     Infertility, female      Polycystic ovary syndrome                    Physical Exam:    Vitals were reviewed  Alert, O x 3  Resp non labored  Ab gravid  EFW 4 pounds  Presentation:Cephalic confirmed on bedside usn                     Assessment:   Gisela Moon is a 33w1d pregnant female admitted with  labor, known cervical change from office exam yesterday, thus far intact, GBS unknown.          Plan:   Admit - see IP orders, recommend betamethasone for pulmonary maturation, NICU consult, initiate nifedipine tocolysis with 20 mg loading dose and as long as tolerates can do max of 20 mg po q 4 hours with option to decrease dose to 10 mg po every 4-6 hours as contractions warrant.  If fails tocolysis with nifedipine would recommend magnesium as at this gestational age indomethacin is not indicated.  Presentation is cephalic, if labor progresses, at this point Dr. Iyer plans to attend delivery but we are available for any further assistance/recommendations.  Postpartum recommend emphasizing an interpregnancy interval of at least 18 months to decrease risk of  birth and would be candidate for progesterone supplementation in future pregnancies.    Thank you for allowing us to participate in this patient care.  Please let me know if we can be of any further assistance.    Eugenia Lozano MD     Total time at bedside, documentation of patient care and discussing with referring provider is 40 minutes.

## 2022-10-01 NOTE — LETTER
[address] [address]  Phone: [phone]  Fax: [phone]    October 1, 2022              To whom it may concern:      Please excuse Ryan Leyva from work, starting 10/1/22. His wife is currently hospitalized due to pregnancy complications.     Please contact me for questions or concerns.      Sincerely,        Cassie Iyer MD

## 2022-10-01 NOTE — PROGRESS NOTES
Received report and assumed care from Suellen BATES RN. Pt still having some ctns, she isn't feeling them per previous RN.    Updated Dr. Iyer, said to hold Nifedipine unless ctns get closer if not just give scheduled 1000 dose.

## 2022-10-01 NOTE — H&P
Maternal Admission H&P  [facility]  Date of Admission: 10/1/2022  Date of Service: 10/1/2022    Name      Gisela Moon         2000  MRN       [id number]  PCP        Cassie Iyer at [facility], [phone].    ________________________________________________________________________    Assessment and Plan:  22 year old  at 33w1d with pregnancy complicated by history of short cervix (2.6 cm at 22 weeks), presenting with contractions, found to be in  labor. FHT category 1. Membranes intact.     Betamethasone for fetal lung maturity    Place PIV, IVF bolus    Group B strep: unknown, start IV PCN    Tocolysis: PO nifedipine 20 mg now, then scheduled 10 mg q4, with hold parameters    Labs (CBC, type & screen, RPR)    Bedrest with bathroom privileges    OB consulted, discussed plan of care with Dr. Lozano    NICU consult due to prematurity  ________________________________________________________________________    Chief Complaint: contractions.    HPI: Gisela Moon is a 22 year old woman at 33w1d, based on an Estimated Date of Delivery: 2022. She presents with contractions. She reports she started having some uterine cramping 2 days ago after having intercourse. She was seen in clinic yesterday and wet prep was negative. She continued to have contractions which became stronger tonight. She denies vaginal bleeding, leakage of fluid. She is feeling normal fetal movement.    Pregnancy complicated by short cervix noted on anatomy scan at 20 weeks (2.5 cm)- repeat cervical length after 2 weeks showed cervix 2.6 cm.     Patient Active Problem List    Diagnosis Date Noted      labor 10/01/2022     Priority: Medium     Pregnancy, unspecified gestational age 2022     Priority: Medium     PCOS (polycystic ovarian syndrome) 2020     Priority: Medium      OB History    Para Term  AB Living   2 0 0  0 1 0   SAB IAB Ectopic Multiple Live Births   1 0 0 0 0      # Outcome Date GA Lbr Donald/2nd Weight Sex Delivery Anes PTL Lv   2 Current            1 SAB 08/2021             Review of Systems Negative except what is noted in HPI.   Past Medical History:   Diagnosis Date     Infertility, female      Polycystic ovary syndrome       No past surgical history on file.Patient has no known allergies.  Family History   Problem Relation Age of Onset     No Known Problems Mother      Heart Disease Father      Liver Disease Father      No Known Problems Brother      Polycystic ovary syndrome Sister      Social History     Socioeconomic History     Marital status: Single     Spouse name: Not on file     Number of children: Not on file     Years of education: Not on file     Highest education level: Not on file   Occupational History     Not on file   Tobacco Use     Smoking status: Never Smoker     Smokeless tobacco: Never Used   Vaping Use     Vaping Use: Never used   Substance and Sexual Activity     Alcohol use: Never     Drug use: Never     Sexual activity: Yes     Partners: Male   Other Topics Concern     Parent/sibling w/ CABG, MI or angioplasty before 65F 55M? Yes     Comment: my dad.   Social History Narrative     Not on file     Social Determinants of Health     Financial Resource Strain: Not on file   Food Insecurity: Not on file   Transportation Needs: Not on file   Physical Activity: Not on file   Stress: Not on file   Social Connections: Not on file   Intimate Partner Violence: Not on file   Housing Stability: Not on file     Prior to Admission Medication List  Medications Prior to Admission   Medication Sig Dispense Refill Last Dose     ferrous sulfate (FEROSUL) 325 (65 Fe) MG tablet Take 1 tablet (325 mg) by mouth daily (with breakfast) 90 tablet 2 9/30/2022 at Unknown time     Prenatal Vit-Fe Fumarate-FA (PRENATAL PLUS) 27-1 MG TABS Take 1 tablet by mouth    9/30/2022 at Unknown time      Allergies  No Known  Allergies  Immunization History   Administered Date(s) Administered     COVID-19,PF,Pfizer (12+ Yrs) 07/15/2021, 09/27/2021     DTAP (<7y) 05/25/2004     DTaP / Hep B / IPV 03/14/2005     Hep B, Peds or Adolescent 11/05/2004     HepA-Peds, Unspecified 03/14/2005     HepB, Unspecified 05/25/2004     Hib, Unspecified 03/14/2005, 08/23/2005     Influenza Intranasal Vaccine 09/25/2013     MMR 05/25/2004, 11/05/2004     Meningococcal (Menactra ) 05/26/2014     Polio, Unspecified  05/25/2004, 08/23/2005     Poliovirus, inactivated (IPV) 11/05/2004     TRIHIBIT (DTAP/HIB, <7y) 11/05/2004     Tdap (Adacel,Boostrix) 03/28/2012, 09/02/2022     Varicella 05/25/2004, 05/03/2010      Physical Exam  Patient Vitals for the past 24 hrs:   Temp Temp src Resp Height Weight   10/01/22 0210 98  F (36.7  C) Oral 20 1.524 m (5') 67.1 kg (148 lb)     Wt Readings from Last 1 Encounters:   10/01/22 67.1 kg (148 lb)   Prepregnancy: 56.7 kg (125 lb), BMI 24.41. Total gain: 10.4 kg (23 lb) (expected gain: 11.5 kg (25 lb)-16 kg (35 lb)).    HEART: RRR, no murmur  LUNGS: No respiratory distress  Fetal Heart Tones: Baseline 131 bpm, variability moderate (6-25 bpm), no decelerations. Reactive.  CONTRACTIONS:  Regular every 5 minutes.  CERVIX: dilation 3 cm , 70% effaced, soft, and -2 station.  FLUID: None noted.  Fetal Presentation: vertex.  Labs    No visits with results within 1 Day(s) from this visit.   Latest known visit with results is:   Prenatal Office Visit on 09/30/2022   Component Date Value Ref Range Status     Trichomonas 09/30/2022 Absent  Absent Final     Yeast 09/30/2022 Absent  Absent Final     Clue Cells 09/30/2022 Absent  Absent Final     WBCs/high power field 09/30/2022 4+ (A) None Final    No results found for: GBPCRT   Antibody Screen   Date Value Ref Range Status   04/14/2022 Negative Negative Final     Hepatitis B Surface Antigen   Date Value Ref Range Status   04/14/2022 Nonreactive Nonreactive Final     Chlamydia  trachomatis   Date Value Ref Range Status   04/14/2022 Negative Negative Final     Comment:     A negative result by transcription mediated amplification does not preclude the presence of C. trachomatis infection because results are dependent on proper and adequate collection, absence of inhibitors and sufficient rRNA to be detected.     Neisseria gonorrhoeae   Date Value Ref Range Status   04/14/2022 Negative Negative Final     Comment:     Negative for N. gonorrhoeae rRNA by transcription mediated amplification. A negative result by transcription mediated amplification does not preclude the presence of C. trachomatis infection because results are dependent on proper and adequate collection, absence of inhibitors and sufficient rRNA to be detected.     Hemoglobin   Date Value Ref Range Status   08/12/2022 10.2 (L) 11.7 - 15.7 g/dL Final        Completed by:   Cassie Iyer MD  [facility]  10/1/2022 4:03 AM   used: Not needed.

## 2022-10-01 NOTE — PLAN OF CARE
Problem: Plan of Care - These are the overarching goals to be used throughout the patient stay.    Goal: Plan of Care Review/Shift Note  Description: The Plan of Care Review/Shift note should be completed every shift.  The Outcome Evaluation is a brief statement about your assessment that the patient is improving, declining, or no change.  This information will be displayed automatically on your shift note.  Outcome: Ongoing, Progressing  Flowsheets (Taken 10/1/2022 0423)  Plan of Care Reviewed With:   patient   spouse  Goal: Optimal Comfort and Wellbeing  Outcome: Ongoing, Progressing     Problem:  Labor  Goal: Delayed  Delivery  Outcome: Ongoing, Progressing

## 2022-10-01 NOTE — CONSULTS
[facility]                Neonatology Antepartum Counseling Consult:  I was asked to provide antepartum counseling for Gisela Moon at the request of Cassie Iyer MD secondary to  labor. Ms. Gisela Moon is currently 33 weeks/ 1 days gestation and has a history significant for:  Patient Active Problem List   Diagnosis     PCOS (polycystic ovarian syndrome)     Pregnancy, unspecified gestational age      labor      Betamethasone was administered on 10/1 and scheduled second dose 10/2.   Ms. Gisela Moon, accompanied by her , was counseled on the expected hospital course, potential risks, and outcomes associated with an infant born at this gestation. The counseling included: initial delivery room stabilization, respiratory course, lung development, hyperbilirubinemia, infection, nutrition, growth and development.  I also explained the basic four criteria for discharge: that the baby had to be free of apnea; able to maintain their body temperature; able to feed by bottle or breast well enough to; attain an adequate pattern of weight gain and growth.  The patient had no remaining questions but was encouraged to contact the NICU via their caregivers should any arise. Thank you for involving the NICU team in the care of this patient.      Floor Time (min): 10  Face to Face Time (min): 15  Total Time (minutes): 25  More than 50% of my time was spent in direct, face to face, antepartum counseling with the above patient.    Karoline BARKLEY, CNP 10/1/2022 12:05 PM   Advanced Practice Providers  [facility]

## 2022-10-02 LAB
BACTERIA UR CULT: NO GROWTH
GP B STREP DNA SPEC QL NAA+PROBE: NEGATIVE

## 2022-10-02 PROCEDURE — 250N000011 HC RX IP 250 OP 636: Performed by: FAMILY MEDICINE

## 2022-10-02 PROCEDURE — 250N000013 HC RX MED GY IP 250 OP 250 PS 637: Performed by: FAMILY MEDICINE

## 2022-10-02 PROCEDURE — 99231 SBSQ HOSP IP/OBS SF/LOW 25: CPT | Performed by: FAMILY MEDICINE

## 2022-10-02 PROCEDURE — 120N000001 HC R&B MED SURG/OB

## 2022-10-02 RX ADMIN — NIFEDIPINE 10 MG: 10 CAPSULE ORAL at 10:32

## 2022-10-02 RX ADMIN — BETAMETHASONE SODIUM PHOSPHATE AND BETAMETHASONE ACETATE 12 MG: 3; 3 INJECTION, SUSPENSION INTRA-ARTICULAR; INTRALESIONAL; INTRAMUSCULAR at 04:05

## 2022-10-02 RX ADMIN — NIFEDIPINE 10 MG: 10 CAPSULE ORAL at 14:31

## 2022-10-02 RX ADMIN — NIFEDIPINE 10 MG: 10 CAPSULE ORAL at 22:35

## 2022-10-02 RX ADMIN — NIFEDIPINE 10 MG: 10 CAPSULE ORAL at 18:39

## 2022-10-02 RX ADMIN — PENICILLIN G 3 MILLION UNITS: 3000000 INJECTION, SOLUTION INTRAVENOUS at 02:21

## 2022-10-02 RX ADMIN — PENICILLIN G 3 MILLION UNITS: 3000000 INJECTION, SOLUTION INTRAVENOUS at 06:22

## 2022-10-02 RX ADMIN — NIFEDIPINE 10 MG: 10 CAPSULE ORAL at 06:17

## 2022-10-02 RX ADMIN — NIFEDIPINE 10 MG: 10 CAPSULE ORAL at 02:17

## 2022-10-02 ASSESSMENT — ACTIVITIES OF DAILY LIVING (ADL)
ADLS_ACUITY_SCORE: 18

## 2022-10-02 NOTE — PLAN OF CARE
Problem:  Labor  Goal: Delayed  Delivery  Outcome: Ongoing, Progressing     Patient was able to sleep t/o night. Denied need for medication for sleep. She continued to have occasional contractions, about three an hour per patient though states are not painful. Instructed to update staff if contractions become painful or more frequent, patient verbalized understanding.

## 2022-10-02 NOTE — PROGRESS NOTES
Maternal Antepartum Progress Note  [facility]  10/2/2022 9:05 AM     ________________________________________________________________________    Assessment:    at 33w2d, presented with contractions, found to be 3 cm dilated, s/p 2 doses of betamethasone, now stable without contractions. Membranes intact.      Active Problems:     labor       Plan:   - Continue NST qshift; if contractions return, resume continuous fetal monitoring  - GBS negative and no active labor- stop IV antibiotics  - Continue tocolysis, PO nifedipine 10 mg q4 scheduled, for 24 hours after last dose of betamethasone (planned last dose 0600 on 10/3/22), then monitor.   - If stable and no regular contractions following last dose of oral nifedipine tomorrow AM, okay to discharge home tomorrow early evening  ________________________________________________________________________    Subjective:  Patient denies headache, dyspnea, chest pain  She is not feeling any more tightening, abdominal pain or low back pain  Slept well overnight    Objective:  Patient Vitals for the past 24 hrs:   BP Temp Temp src Pulse Resp   10/02/22 0616 96/50 -- -- -- --   10/02/22 0614 99/52 97.9  F (36.6  C) -- -- --   10/02/22 0215 108/62 -- -- -- 16   10/01/22 2217 112/59 98.6  F (37  C) Oral -- 16   10/01/22 1621 115/69 98.3  F (36.8  C) Oral -- 16   10/01/22 1000 111/65 98.3  F (36.8  C) Oral 81 16     General: Alert, comfortable  Heart: RRR, no murmur  Lungs: CTA bilaterally  Abdomen: gravid, non-tender  Ext: no edema     Completed by:   Cassie Iyer MD  [facility]  10/2/2022 9:05 AM   used: Not needed.

## 2022-10-02 NOTE — PLAN OF CARE
Pt has not been tremayne regularly. She denies feeling contractions or having vaginal leakage. Her NST was reactive.

## 2022-10-02 NOTE — PROGRESS NOTES
1100 Pt c/o slept well last night. She denies feeling contractions or vaginal leakage. NST was reactive and occasional mild contractions noticed.

## 2022-10-03 ENCOUNTER — HEALTH MAINTENANCE LETTER (OUTPATIENT)
Age: 22
End: 2022-10-03

## 2022-10-03 VITALS
HEIGHT: 60 IN | RESPIRATION RATE: 16 BRPM | HEART RATE: 81 BPM | WEIGHT: 148 LBS | SYSTOLIC BLOOD PRESSURE: 110 MMHG | DIASTOLIC BLOOD PRESSURE: 58 MMHG | TEMPERATURE: 98.1 F | BODY MASS INDEX: 29.06 KG/M2

## 2022-10-03 PROCEDURE — 250N000013 HC RX MED GY IP 250 OP 250 PS 637: Performed by: FAMILY MEDICINE

## 2022-10-03 PROCEDURE — 99238 HOSP IP/OBS DSCHRG MGMT 30/<: CPT | Performed by: FAMILY MEDICINE

## 2022-10-03 RX ADMIN — NIFEDIPINE 10 MG: 10 CAPSULE ORAL at 02:18

## 2022-10-03 RX ADMIN — NIFEDIPINE 10 MG: 10 CAPSULE ORAL at 06:21

## 2022-10-03 ASSESSMENT — ACTIVITIES OF DAILY LIVING (ADL)
ADLS_ACUITY_SCORE: 18

## 2022-10-03 NOTE — PROGRESS NOTES
Pt denies having contractions or leakage of vaginal fluid this morning. NST was reactive. Only one contraction was traced on the monitor for 30 min monitoring.  Pt continue denies feeling contractions or leakage of vaginal fluid this afternoon.

## 2022-10-03 NOTE — DISCHARGE SUMMARY
Maternal Discharge Summary  [facility]  Date of Service: 10/3/2022    Name      Gisela Moon         2000  MRN       [id number]  PCP        Cassie Haskins at [facility], [phone].  ________________________________________________________________________    Hospital Course:  Gisela Moon is a 22 year old now , admitted for  labor, now s/p tocoylsis and course of betamethasone. On admission, SVE 3/70/-2. She received oral nifedipine for tocolysis and her contractions resolved. She received 2 doses of betamethasone and 24 hours following her last dose of betamethasone, oral nifedipine was discontinued. She was monitored inpatient throughout the course of the day and did not have any increase in contractions or discomfort. FHT remained reassuring. She will be discharged home.     Discharge Plan:   1. Discharge to Home. Condition at Discharge:  stable  2. Follow up with Cassie Haskins for routine OB care  Future Appointments   Date Time Provider Department Center   10/14/2022 12:00 PM Cassie Iyer MD [facility]   10/21/2022  9:00 AM Cassie Iyer MD [facility]   10/28/2022  9:00 AM Cassie Iyer MD [facility]   2022  9:00 AM Cassie Iyer MD [facility]   2022  9:00 AM Cassie Iyer MD [facility]   2022  9:20 AM Cassie [facility]yer MD [facility]      ________________________________________________________________________    Admission Date:  10/1/2022  Discharge Date:  10/3/2022  Principal Diagnosis:  contractions/labor    Active Problems:     labor      Subjective:  Patient denies any painful or regular contractions. She slept well overnight. She has not felt any painful contractions since admission, after she received a dose of oral nifedipine. She denies any vaginal bleeding, leakage of fluid. Normal fetal movement.    Discharge Exam:  Patient  Vitals for the past 24 hrs:   BP Temp Temp src Resp   10/03/22 1043 110/58 98.1  F (36.7  C) Oral 16   10/03/22 0619 104/52 98  F (36.7  C) Oral 16   10/03/22 0218 105/51 -- -- --   10/02/22 2234 113/59 97.6  F (36.4  C) Oral 16     General - alert, comfortable  Heart - RRR, no murmurs  Lungs - CTA bilaterally  Abdomen - gravid, non-tender  Extremities - trace edema in lower extremities  Admission on 10/01/2022, Discharged on 10/03/2022   Component Date Value Ref Range Status     SARS CoV2 PCR 10/01/2022 Negative  Negative Final    NEGATIVE: SARS-CoV-2 (COVID-19) RNA not detected, presumed negative.     Group B Strep PCR 10/01/2022 Negative  Negative Final    Presumed negative for Streptococcus agalactiae (Group B Streptococcus) or the number of organisms may be below the limit of detection of the assay.     Treponema Antibody Total 10/01/2022 Nonreactive  Nonreactive Final     WBC Count 10/01/2022 11.5 (A) 4.0 - 11.0 10e3/uL Final     RBC Count 10/01/2022 3.75 (A) 3.80 - 5.20 10e6/uL Final     Hemoglobin 10/01/2022 11.2 (A) 11.7 - 15.7 g/dL Final     Hematocrit 10/01/2022 33.7 (A) 35.0 - 47.0 % Final     MCV 10/01/2022 90  78 - 100 fL Final     MCH 10/01/2022 29.9  26.5 - 33.0 pg Final     MCHC 10/01/2022 33.2  31.5 - 36.5 g/dL Final     RDW 10/01/2022 13.1  10.0 - 15.0 % Final     Platelet Count 10/01/2022 153  150 - 450 10e3/uL Final     ABO/RH(D) 10/01/2022 B POS   Final     Antibody Screen 10/01/2022 Negative  Negative Final     SPECIMEN EXPIRATION DATE 10/01/2022 [id number]   Final     Color Urine 10/01/2022 Colorless  Colorless, Straw, Light Yellow, Yellow Final     Appearance Urine 10/01/2022 Clear  Clear Final     Glucose Urine 10/01/2022 Negative  Negative mg/dL Final     Bilirubin Urine 10/01/2022 Negative  Negative Final     Ketones Urine 10/01/2022 Negative  Negative mg/dL Final     Specific Gravity Urine 10/01/2022 1.008  1.001 - 1.030 Final     Blood Urine 10/01/2022 0.03 mg/dL (A) Negative Final      pH Urine 10/01/2022 6.5  5.0 - 7.0 Final     Protein Albumin Urine 10/01/2022 Negative  Negative mg/dL Final     Urobilinogen Urine 10/01/2022 <2.0  <2.0 mg/dL Final     Nitrite Urine 10/01/2022 Negative  Negative Final     Leukocyte Esterase Urine 10/01/2022 250 Jeramy/uL (A) Negative Final     RBC Urine 10/01/2022 1  <=2 /HPF Final     WBC Urine 10/01/2022 5  <=5 /HPF Final     Squamous Epithelials Urine 10/01/2022 <1  <=1 /HPF Final     Amphetamines Urine 10/01/2022 Screen Negative  Screen Negative Final    Cutoff for a negative amphetamine is less than 500 ng/mL.     Barbituates Urine 10/01/2022 Screen Negative  Screen Negative Final    Cutoff for a negative barbiturate is less than 200 ng/mL.     Benzodiazepine Urine 10/01/2022 Screen Negative  Screen Negative Final    Cutoff for a negative benzodiazepine is less than 100 ng/mL.     Cannabinoids Urine 10/01/2022 Screen Negative  Screen Negative Final    Cutoff for a negative cannabinoid is less than 50 ng/mL.     Cocaine Urine 10/01/2022 Screen Negative  Screen Negative Final    Cutoff for a negative cocaine is less than 300 ng/mL.     Opiates Urine 10/01/2022 Screen Negative  Screen Negative Final    Cutoff for a negative opiate is less than 300 ng/mL.     PCP Urine 10/01/2022 Screen Negative  Screen Negative Final    Cutoff for a negative PCP is less than 25 ng/mL.     Culture 10/01/2022 No Growth   Final   Prenatal Office Visit on 09/30/2022   Component Date Value Ref Range Status     Trichomonas 09/30/2022 Absent  Absent Final     Yeast 09/30/2022 Absent  Absent Final     Clue Cells 09/30/2022 Absent  Absent Final     WBCs/high power field 09/30/2022 4+ (A) None Final      Discharge Medications: See medication reconciliation.     Completed by:   Cassie Iyer MD  [facility]  10/3/2022 9:02 PM   used: Not needed.

## 2022-10-04 ENCOUNTER — HOSPITAL ENCOUNTER (INPATIENT)
Facility: HOSPITAL | Age: 22
LOS: 4 days | Discharge: HOME | End: 2022-10-08
Attending: FAMILY MEDICINE | Admitting: FAMILY MEDICINE
Payer: COMMERCIAL

## 2022-10-04 DIAGNOSIS — O42.919 PRETERM PREMATURE RUPTURE OF MEMBRANES (PPROM) DELIVERED, CURRENT HOSPITALIZATION: ICD-10-CM

## 2022-10-04 LAB
ABO/RH(D): NORMAL
ALBUMIN UR-MCNC: NEGATIVE MG/DL
ANTIBODY SCREEN: NEGATIVE
APPEARANCE UR: CLEAR
BASOPHILS # BLD MANUAL: 0 10E3/UL (ref 0–0.2)
BASOPHILS NFR BLD MANUAL: 0 %
BILIRUB UR QL STRIP: NEGATIVE
CLUE CELLS: ABNORMAL
COLOR UR AUTO: COLORLESS
EOSINOPHIL # BLD MANUAL: 0 10E3/UL (ref 0–0.7)
EOSINOPHIL NFR BLD MANUAL: 0 %
ERYTHROCYTE [DISTWIDTH] IN BLOOD BY AUTOMATED COUNT: 13.2 % (ref 10–15)
GLUCOSE UR STRIP-MCNC: NEGATIVE MG/DL
HCT VFR BLD AUTO: 30.4 % (ref 35–47)
HGB BLD-MCNC: 9.7 G/DL (ref 11.7–15.7)
HGB UR QL STRIP: NEGATIVE
KETONES UR STRIP-MCNC: NEGATIVE MG/DL
LEUKOCYTE ESTERASE UR QL STRIP: ABNORMAL
LYMPHOCYTES # BLD MANUAL: 1 10E3/UL (ref 0.8–5.3)
LYMPHOCYTES NFR BLD MANUAL: 10 %
MCH RBC QN AUTO: 29.4 PG (ref 26.5–33)
MCHC RBC AUTO-ENTMCNC: 31.9 G/DL (ref 31.5–36.5)
MCV RBC AUTO: 92 FL (ref 78–100)
MONOCYTES # BLD MANUAL: 0.8 10E3/UL (ref 0–1.3)
MONOCYTES NFR BLD MANUAL: 8 %
NEUTROPHILS # BLD MANUAL: 7.8 10E3/UL (ref 1.6–8.3)
NEUTROPHILS NFR BLD MANUAL: 82 %
NITRATE UR QL: NEGATIVE
PH UR STRIP: 7 [PH] (ref 5–7)
PLAT MORPH BLD: NORMAL
PLATELET # BLD AUTO: 156 10E3/UL (ref 150–450)
RBC # BLD AUTO: 3.3 10E6/UL (ref 3.8–5.2)
RBC MORPH BLD: NORMAL
RBC URINE: 0 /HPF
RUPTURE OF FETAL MEMBRANES BY ROM PLUS: POSITIVE
SARS-COV-2 RNA RESP QL NAA+PROBE: NEGATIVE
SP GR UR STRIP: 1 (ref 1–1.03)
SPECIMEN EXPIRATION DATE: NORMAL
SQUAMOUS EPITHELIAL: <1 /HPF
TRICHOMONAS, WET PREP: ABNORMAL
UROBILINOGEN UR STRIP-MCNC: <2 MG/DL
WBC # BLD AUTO: 9.5 10E3/UL (ref 4–11)
WBC URINE: 1 /HPF
WBC'S/HIGH POWER FIELD, WET PREP: ABNORMAL
YEAST, WET PREP: ABNORMAL

## 2022-10-04 PROCEDURE — 250N000011 HC RX IP 250 OP 636: Performed by: FAMILY MEDICINE

## 2022-10-04 PROCEDURE — 250N000013 HC RX MED GY IP 250 OP 250 PS 637: Performed by: FAMILY MEDICINE

## 2022-10-04 PROCEDURE — 87210 SMEAR WET MOUNT SALINE/INK: CPT | Performed by: FAMILY MEDICINE

## 2022-10-04 PROCEDURE — 85027 COMPLETE CBC AUTOMATED: CPT | Performed by: FAMILY MEDICINE

## 2022-10-04 PROCEDURE — 99221 1ST HOSP IP/OBS SF/LOW 40: CPT | Performed by: FAMILY MEDICINE

## 2022-10-04 PROCEDURE — 36415 COLL VENOUS BLD VENIPUNCTURE: CPT | Performed by: FAMILY MEDICINE

## 2022-10-04 PROCEDURE — 81001 URINALYSIS AUTO W/SCOPE: CPT | Performed by: FAMILY MEDICINE

## 2022-10-04 PROCEDURE — 86850 RBC ANTIBODY SCREEN: CPT | Performed by: FAMILY MEDICINE

## 2022-10-04 PROCEDURE — U0003 INFECTIOUS AGENT DETECTION BY NUCLEIC ACID (DNA OR RNA); SEVERE ACUTE RESPIRATORY SYNDROME CORONAVIRUS 2 (SARS-COV-2) (CORONAVIRUS DISEASE [COVID-19]), AMPLIFIED PROBE TECHNIQUE, MAKING USE OF HIGH THROUGHPUT TECHNOLOGIES AS DESCRIBED BY CMS-2020-01-R: HCPCS | Performed by: FAMILY MEDICINE

## 2022-10-04 PROCEDURE — 120N000001 HC R&B MED SURG/OB

## 2022-10-04 PROCEDURE — 85007 BL SMEAR W/DIFF WBC COUNT: CPT | Performed by: FAMILY MEDICINE

## 2022-10-04 PROCEDURE — 86780 TREPONEMA PALLIDUM: CPT | Performed by: FAMILY MEDICINE

## 2022-10-04 PROCEDURE — 86901 BLOOD TYPING SEROLOGIC RH(D): CPT | Performed by: FAMILY MEDICINE

## 2022-10-04 PROCEDURE — 84112 EVAL AMNIOTIC FLUID PROTEIN: CPT | Performed by: FAMILY MEDICINE

## 2022-10-04 RX ORDER — AMPICILLIN 2 G/1
2 INJECTION, POWDER, FOR SOLUTION INTRAVENOUS EVERY 6 HOURS
Status: DISPENSED | OUTPATIENT
Start: 2022-10-04 | End: 2022-10-06

## 2022-10-04 RX ORDER — FENTANYL CITRATE 50 UG/ML
100 INJECTION, SOLUTION INTRAMUSCULAR; INTRAVENOUS
Status: DISCONTINUED | OUTPATIENT
Start: 2022-10-04 | End: 2022-10-07 | Stop reason: HOSPADM

## 2022-10-04 RX ORDER — METOCLOPRAMIDE 10 MG/1
10 TABLET ORAL EVERY 6 HOURS PRN
Status: DISCONTINUED | OUTPATIENT
Start: 2022-10-04 | End: 2022-10-07 | Stop reason: HOSPADM

## 2022-10-04 RX ORDER — ONDANSETRON 4 MG/1
4 TABLET, ORALLY DISINTEGRATING ORAL EVERY 6 HOURS PRN
Status: DISCONTINUED | OUTPATIENT
Start: 2022-10-04 | End: 2022-10-07 | Stop reason: HOSPADM

## 2022-10-04 RX ORDER — METOCLOPRAMIDE HYDROCHLORIDE 5 MG/ML
10 INJECTION INTRAMUSCULAR; INTRAVENOUS EVERY 6 HOURS PRN
Status: DISCONTINUED | OUTPATIENT
Start: 2022-10-04 | End: 2022-10-07 | Stop reason: HOSPADM

## 2022-10-04 RX ORDER — SODIUM CHLORIDE, SODIUM LACTATE, POTASSIUM CHLORIDE, CALCIUM CHLORIDE 600; 310; 30; 20 MG/100ML; MG/100ML; MG/100ML; MG/100ML
INJECTION, SOLUTION INTRAVENOUS CONTINUOUS PRN
Status: DISCONTINUED | OUTPATIENT
Start: 2022-10-04 | End: 2022-10-07 | Stop reason: HOSPADM

## 2022-10-04 RX ORDER — MISOPROSTOL 200 UG/1
400 TABLET ORAL
Status: DISCONTINUED | OUTPATIENT
Start: 2022-10-04 | End: 2022-10-07 | Stop reason: HOSPADM

## 2022-10-04 RX ORDER — IBUPROFEN 800 MG/1
800 TABLET, FILM COATED ORAL
Status: DISCONTINUED | OUTPATIENT
Start: 2022-10-04 | End: 2022-10-08 | Stop reason: HOSPADM

## 2022-10-04 RX ORDER — CARBOPROST TROMETHAMINE 250 UG/ML
250 INJECTION, SOLUTION INTRAMUSCULAR
Status: DISCONTINUED | OUTPATIENT
Start: 2022-10-04 | End: 2022-10-07 | Stop reason: HOSPADM

## 2022-10-04 RX ORDER — OXYTOCIN 10 [USP'U]/ML
10 INJECTION, SOLUTION INTRAMUSCULAR; INTRAVENOUS
Status: DISCONTINUED | OUTPATIENT
Start: 2022-10-04 | End: 2022-10-07 | Stop reason: HOSPADM

## 2022-10-04 RX ORDER — HYDROXYZINE HYDROCHLORIDE 50 MG/1
50-100 TABLET, FILM COATED ORAL
Status: DISCONTINUED | OUTPATIENT
Start: 2022-10-04 | End: 2022-10-08 | Stop reason: HOSPADM

## 2022-10-04 RX ORDER — NALOXONE HYDROCHLORIDE 0.4 MG/ML
0.4 INJECTION, SOLUTION INTRAMUSCULAR; INTRAVENOUS; SUBCUTANEOUS
Status: DISCONTINUED | OUTPATIENT
Start: 2022-10-04 | End: 2022-10-07 | Stop reason: HOSPADM

## 2022-10-04 RX ORDER — AMOXICILLIN 250 MG/1
250 CAPSULE ORAL 3 TIMES DAILY
Status: DISCONTINUED | OUTPATIENT
Start: 2022-10-06 | End: 2022-10-07 | Stop reason: HOSPADM

## 2022-10-04 RX ORDER — PROCHLORPERAZINE 25 MG
25 SUPPOSITORY, RECTAL RECTAL EVERY 12 HOURS PRN
Status: DISCONTINUED | OUTPATIENT
Start: 2022-10-04 | End: 2022-10-07 | Stop reason: HOSPADM

## 2022-10-04 RX ORDER — ONDANSETRON 2 MG/ML
4 INJECTION INTRAMUSCULAR; INTRAVENOUS EVERY 6 HOURS PRN
Status: DISCONTINUED | OUTPATIENT
Start: 2022-10-04 | End: 2022-10-07 | Stop reason: HOSPADM

## 2022-10-04 RX ORDER — OXYTOCIN/0.9 % SODIUM CHLORIDE 30/500 ML
100-340 PLASTIC BAG, INJECTION (ML) INTRAVENOUS CONTINUOUS PRN
Status: DISCONTINUED | OUTPATIENT
Start: 2022-10-04 | End: 2022-10-08 | Stop reason: HOSPADM

## 2022-10-04 RX ORDER — NALOXONE HYDROCHLORIDE 0.4 MG/ML
0.2 INJECTION, SOLUTION INTRAMUSCULAR; INTRAVENOUS; SUBCUTANEOUS
Status: DISCONTINUED | OUTPATIENT
Start: 2022-10-04 | End: 2022-10-07 | Stop reason: HOSPADM

## 2022-10-04 RX ORDER — MISOPROSTOL 200 UG/1
800 TABLET ORAL
Status: DISCONTINUED | OUTPATIENT
Start: 2022-10-04 | End: 2022-10-07 | Stop reason: HOSPADM

## 2022-10-04 RX ORDER — METHYLERGONOVINE MALEATE 0.2 MG/ML
200 INJECTION INTRAVENOUS
Status: DISCONTINUED | OUTPATIENT
Start: 2022-10-04 | End: 2022-10-07 | Stop reason: HOSPADM

## 2022-10-04 RX ORDER — LIDOCAINE 40 MG/G
CREAM TOPICAL
Status: DISCONTINUED | OUTPATIENT
Start: 2022-10-04 | End: 2022-10-04 | Stop reason: HOSPADM

## 2022-10-04 RX ORDER — KETOROLAC TROMETHAMINE 30 MG/ML
30 INJECTION, SOLUTION INTRAMUSCULAR; INTRAVENOUS
Status: DISCONTINUED | OUTPATIENT
Start: 2022-10-04 | End: 2022-10-08 | Stop reason: HOSPADM

## 2022-10-04 RX ORDER — CITRIC ACID/SODIUM CITRATE 334-500MG
30 SOLUTION, ORAL ORAL
Status: DISCONTINUED | OUTPATIENT
Start: 2022-10-04 | End: 2022-10-07 | Stop reason: HOSPADM

## 2022-10-04 RX ORDER — PROCHLORPERAZINE MALEATE 10 MG
10 TABLET ORAL EVERY 6 HOURS PRN
Status: DISCONTINUED | OUTPATIENT
Start: 2022-10-04 | End: 2022-10-07 | Stop reason: HOSPADM

## 2022-10-04 RX ORDER — OXYTOCIN 10 [USP'U]/ML
10 INJECTION, SOLUTION INTRAMUSCULAR; INTRAVENOUS
Status: DISCONTINUED | OUTPATIENT
Start: 2022-10-04 | End: 2022-10-08 | Stop reason: HOSPADM

## 2022-10-04 RX ORDER — AZITHROMYCIN 250 MG/1
1000 TABLET, FILM COATED ORAL ONCE
Status: COMPLETED | OUTPATIENT
Start: 2022-10-04 | End: 2022-10-04

## 2022-10-04 RX ORDER — OXYTOCIN/0.9 % SODIUM CHLORIDE 30/500 ML
340 PLASTIC BAG, INJECTION (ML) INTRAVENOUS CONTINUOUS PRN
Status: DISCONTINUED | OUTPATIENT
Start: 2022-10-04 | End: 2022-10-07 | Stop reason: HOSPADM

## 2022-10-04 RX ADMIN — AZITHROMYCIN MONOHYDRATE 1000 MG: 250 TABLET ORAL at 12:30

## 2022-10-04 RX ADMIN — AMPICILLIN 2 G: 2 INJECTION, POWDER, FOR SOLUTION INTRAVENOUS at 18:32

## 2022-10-04 RX ADMIN — AMPICILLIN 2 G: 2 INJECTION, POWDER, FOR SOLUTION INTRAVENOUS at 12:30

## 2022-10-04 ASSESSMENT — ACTIVITIES OF DAILY LIVING (ADL)
CHANGE_IN_FUNCTIONAL_STATUS_SINCE_ONSET_OF_CURRENT_ILLNESS/INJURY: NO
ADLS_ACUITY_SCORE: 18
DRESSING/BATHING_DIFFICULTY: NO
DIFFICULTY_EATING/SWALLOWING: NO
CONCENTRATING,_REMEMBERING_OR_MAKING_DECISIONS_DIFFICULTY: NO
DOING_ERRANDS_INDEPENDENTLY_DIFFICULTY: NO
ADLS_ACUITY_SCORE: 18
ADLS_ACUITY_SCORE: 18
WEAR_GLASSES_OR_BLIND: NO
FALL_HISTORY_WITHIN_LAST_SIX_MONTHS: NO
TOILETING_ISSUES: NO
ADLS_ACUITY_SCORE: 18
ADLS_ACUITY_SCORE: 31
WALKING_OR_CLIMBING_STAIRS_DIFFICULTY: NO
ADLS_ACUITY_SCORE: 18
ADLS_ACUITY_SCORE: 18

## 2022-10-04 NOTE — H&P
Maternal Admission H&P  [facility]  Date of Admission: 10/4/2022  Date of Service: 10/4/2022    Name      Gisela Moon         2000  MRN       [id number]  PCP        Cassie Iyer at [facility], [phone].    ________________________________________________________________________    Assessment and Plan:  22 year old  at 33w4d with pregnancy complicated by  labor with previous admission 10/1-10/3 s/p full course of betamethasone for fetal lung maternity and tocolysis with oral nifedipine, now admitted for PPROM. GBS negative. FHT reassuring. Maternal vitals stable.     PPROM: s/p recent full course of betamethasone for fetal lung maturity, tocolysis. ROM+ positive, clear fluid.     - Continuous fetal monitoring   - Start latency antibiotics   - Wet prep, UA unremarkable   - Labs pending    - Bedside US to confirm position   - NICU consult previously completed during last admission    Group B strep: negative    Discussed plan of care at bedside with RN, patient and her .   ________________________________________________________________________    Chief Complaint: leakage of fluid.    HPI: Gisela Moon is a 22 year old woman at 33w4d, based on an Estimated Date of Delivery: 2022. She presents with leakage of clear fluid.    Pregnancy complicated by shortened cervix (2.5 cm at 20 weeks, 2.6 cm at 22 week) and  labor at 33w1d, admitted 10/1-10/3. Cervix was 3/70/-2. She received full course of betamethasone, tocolysis with oral nifedipine, and she remained stable without any signs of labor and was discharged home on 10/3/22.      She slept well last night. She reports this morning she got up to go to the bathroom, and when she got back in bed to put a pillow between her legs, she noticed a small gush of fluid around 0755. She continued to have leakage of fluid with position changes. She is feeling some  mild irregular tightening but denies any painful contractions. Denies vaginal bleeding.     Patient Active Problem List    Diagnosis Date Noted      premature rupture of membranes (PPROM) delivered, current hospitalization 10/04/2022     Priority: Medium      labor 10/01/2022     Priority: Medium     Pregnancy, unspecified gestational age 2022     Priority: Medium     PCOS (polycystic ovarian syndrome) 2020     Priority: Medium      OB History    Para Term  AB Living   2 0 0 0 1 0   SAB IAB Ectopic Multiple Live Births   1 0 0 0 0      # Outcome Date GA Lbr Donald/2nd Weight Sex Delivery Anes PTL Lv   2 Current            1 SAB 2021             Review of Systems Negative except what is noted in HPI.   Past Medical History:   Diagnosis Date     Infertility, female      Polycystic ovary syndrome       No past surgical history on file.Patient has no known allergies.  Family History   Problem Relation Age of Onset     No Known Problems Mother      Heart Disease Father      Liver Disease Father      No Known Problems Brother      Polycystic ovary syndrome Sister      Social History     Socioeconomic History     Marital status: Single     Spouse name: Not on file     Number of children: Not on file     Years of education: Not on file     Highest education level: Not on file   Occupational History     Not on file   Tobacco Use     Smoking status: Never Smoker     Smokeless tobacco: Never Used   Vaping Use     Vaping Use: Never used   Substance and Sexual Activity     Alcohol use: Never     Drug use: Never     Sexual activity: Yes     Partners: Male   Other Topics Concern     Parent/sibling w/ CABG, MI or angioplasty before 65F 55M? Yes     Comment: my dad.   Social History Narrative     Not on file     Social Determinants of Health     Financial Resource Strain: Not on file   Food Insecurity: Not on file   Transportation Needs: Not on file   Physical Activity: Not on file   Stress:  Not on file   Social Connections: Not on file   Intimate Partner Violence: Not on file   Housing Stability: Not on file     Prior to Admission Medication List  Medications Prior to Admission   Medication Sig Dispense Refill Last Dose     ferrous sulfate (FEROSUL) 325 (65 Fe) MG tablet Take 1 tablet (325 mg) by mouth daily (with breakfast) 90 tablet 2 Past Week at Unknown time     Prenatal Vit-Fe Fumarate-FA (PRENATAL PLUS) 27-1 MG TABS Take 1 tablet by mouth    Past Week at Unknown time      Allergies  No Known Allergies  Immunization History   Administered Date(s) Administered     COVID-19,PF,Pfizer (12+ Yrs) 07/15/2021, 09/27/2021     DTAP (<7y) 05/25/2004     DTaP / Hep B / IPV 03/14/2005     Hep B, Peds or Adolescent 11/05/2004     HepA-Peds, Unspecified 03/14/2005     HepB, Unspecified 05/25/2004     Hib, Unspecified 03/14/2005, 08/23/2005     Influenza Intranasal Vaccine 09/25/2013     MMR 05/25/2004, 11/05/2004     Meningococcal (Menactra ) 05/26/2014     Polio, Unspecified  05/25/2004, 08/23/2005     Poliovirus, inactivated (IPV) 11/05/2004     TRIHIBIT (DTAP/HIB, <7y) 11/05/2004     Tdap (Adacel,Boostrix) 03/28/2012, 09/02/2022     Varicella 05/25/2004, 05/03/2010      Physical Exam  Patient Vitals for the past 24 hrs:   BP Temp Temp src Pulse Resp   10/04/22 1204 -- 98.2  F (36.8  C) Oral -- --   10/04/22 1053 98/56 98.5  F (36.9  C) Oral 67 16   10/04/22 1050 98/56 -- -- -- --     Wt Readings from Last 1 Encounters:   10/01/22 67.1 kg (148 lb)   Prepregnancy: 56.7 kg (125 lb), BMI 24.41. Total gain: 10.4 kg (23 lb) (expected gain: 11.5 kg (25 lb)-16 kg (35 lb)).    HEART: RRR, no murmur  LUNGS: CTA bilaterally  Fetal Heart Tones: Baseline 130 bpm, variability moderate (6-25 bpm), no decelerations. Reactive.  CONTRACTIONS:  Every 5-10 minutes.  CERVIX: not checked, recent SVE 3/70/-2 on 10/1/22  FLUID: Clear.  Fetal Presentation: vertex. By bedside US  Labs    Admission on 10/04/2022   Component Date Value  Ref Range Status     Rupture of Fetal Membranes by ROM * 10/04/2022 Positive (A) Negative, Invalid, Suggest Repeat Final     Trichomonas 10/04/2022 Absent  Absent Final     Yeast 10/04/2022 Absent  Absent Final     Clue Cells 10/04/2022 Absent  Absent Final     WBCs/high power field 10/04/2022 1+ (A) None Final     Color Urine 10/04/2022 Colorless  Colorless, Straw, Light Yellow, Yellow Final     Appearance Urine 10/04/2022 Clear  Clear Final     Glucose Urine 10/04/2022 Negative  Negative mg/dL Final     Bilirubin Urine 10/04/2022 Negative  Negative Final     Ketones Urine 10/04/2022 Negative  Negative mg/dL Final     Specific Gravity Urine 10/04/2022 1.004  1.001 - 1.030 Final     Blood Urine 10/04/2022 Negative  Negative Final     pH Urine 10/04/2022 7.0  5.0 - 7.0 Final     Protein Albumin Urine 10/04/2022 Negative  Negative mg/dL Final     Urobilinogen Urine 10/04/2022 <2.0  <2.0 mg/dL Final     Nitrite Urine 10/04/2022 Negative  Negative Final     Leukocyte Esterase Urine 10/04/2022 25 Jeramy/uL (A) Negative Final     RBC Urine 10/04/2022 0  <=2 /HPF Final     WBC Urine 10/04/2022 1  <=5 /HPF Final     Squamous Epithelials Urine 10/04/2022 <1  <=1 /HPF Final     WBC Count 10/04/2022 9.5  4.0 - 11.0 10e3/uL Preliminary     RBC Count 10/04/2022 3.30 (A) 3.80 - 5.20 10e6/uL Preliminary     Hemoglobin 10/04/2022 9.7 (A) 11.7 - 15.7 g/dL Preliminary     Hematocrit 10/04/2022 30.4 (A) 35.0 - 47.0 % Preliminary     MCV 10/04/2022 92  78 - 100 fL Preliminary     MCH 10/04/2022 29.4  26.5 - 33.0 pg Preliminary     MCHC 10/04/2022 31.9  31.5 - 36.5 g/dL Preliminary     RDW 10/04/2022 13.2  10.0 - 15.0 % Preliminary     Platelet Count 10/04/2022 156  150 - 450 10e3/uL Preliminary      Group B Strep PCR   Date Value Ref Range Status   10/01/2022 Negative Negative Final     Comment:     Presumed negative for Streptococcus agalactiae (Group B Streptococcus) or the number of organisms may be below the limit of detection of the  assay.      Antibody Screen   Date Value Ref Range Status   10/01/2022 Negative Negative Final     Hepatitis B Surface Antigen   Date Value Ref Range Status   04/14/2022 Nonreactive Nonreactive Final     Chlamydia trachomatis   Date Value Ref Range Status   04/14/2022 Negative Negative Final     Comment:     A negative result by transcription mediated amplification does not preclude the presence of C. trachomatis infection because results are dependent on proper and adequate collection, absence of inhibitors and sufficient rRNA to be detected.     Neisseria gonorrhoeae   Date Value Ref Range Status   04/14/2022 Negative Negative Final     Comment:     Negative for N. gonorrhoeae rRNA by transcription mediated amplification. A negative result by transcription mediated amplification does not preclude the presence of C. trachomatis infection because results are dependent on proper and adequate collection, absence of inhibitors and sufficient rRNA to be detected.     Hemoglobin   Date Value Ref Range Status   10/04/2022 9.7 (L) 11.7 - 15.7 g/dL Preliminary        Completed by:   Cassie Iyer MD  [facility]  10/4/2022 12:39 PM   used: Not needed.

## 2022-10-04 NOTE — PLAN OF CARE
VSS. Mother coping well. States that she feels some tightening , but not uncomfortable with her contractions. Pt continues to leak clear fluid. Admission labs sent , IV placed, pt resting comfortably. Awaiting Dr. Iyer to see pt.   Problem:  Labor  Goal: Delayed  Delivery  Outcome: Ongoing, Progressing     Problem: Change in Fetal Wellbeing (Labor)  Goal: Stable Fetal Wellbeing  Outcome: Ongoing, Progressing     Problem: Infection (Labor)  Goal: Absence of Infection Signs and Symptoms  Outcome: Ongoing, Progressing

## 2022-10-04 NOTE — LETTER
[address] [address]  Phone: [phone]  Fax: [phone]    October 4, 2022            To whom it may concern:    RE: Gisela Leyva's wife, Gisela Moon, is currently re-hospitalized (admitted 10/4/22) for complications of pregnancy and she will remain hospitalized until delivery. Ryan will need to remain off work at this time.         Please contact me for questions or concerns.      Sincerely,      Cassie Iyer MD

## 2022-10-04 NOTE — PROVIDER NOTIFICATION
10/04/22 1135   Provider Notification   Provider Name/Title dr. mathews   Method of Notification Phone   Request Evaluate - Remote   Notification Reason Pain;Uterine Activity;Labor Status;Membrane Status;Patient Request;Status Update     RN update MD - > rom plus was positive. Efm category I, contractions every 4-8 minutes. Gisela notices the tightening, but is not uncomfortable. Admission orders received. Orders for a repeat covid due to the prior one being over 72 hours, an iv, and fluids if her urine is concentrated.      MD is en route to hospital.

## 2022-10-04 NOTE — PROGRESS NOTES
Gisela Moon is 23 yo pt  , 33 + 4 , of Stefanie Iyer who presents to labor and delivery for Rule out rupture of membranes.     Pt states that her water broke @ 0755 this am , states the fluid is clear. Pt states that she is feeling some contractions but they are not painful. Pt denies vaginal bleeding , HA , vision changes, RUQ pain, N/V, Endorses + fetal movement.     EFM applied, VS taken , ROM + and wet prep, urine collected.     1111: Dr Iyer updated that pt has arrived. Dr Iyer states no SVE at this time, send urine and wet prep and continue to monitor pt.

## 2022-10-04 NOTE — PLAN OF CARE
VSS. PT continues to cope well . Pt reports feeling her contractions but they are very mild and remain unchanged from previous. Continues to leak clear fluid . Pt up to the bathroom independently, SCDS in place, ABX given . EFM continuous per order.  Will continue to monitor.   Problem:  Labor  Goal: Delayed  Delivery  10/4/2022 1541 by Fely Lanza RN  Outcome: Ongoing, Progressing  10/4/2022 1210 by Fely Lanza RN  Outcome: Ongoing, Progressing  Intervention: Monitor and Manage  Labor  Recent Flowsheet Documentation  Taken 10/4/2022 1535 by Fely Lanza RN  Body Position: position changed independently     Problem: Change in Fetal Wellbeing (Labor)  Goal: Stable Fetal Wellbeing  10/4/2022 1541 by Fely Lanza RN  Outcome: Ongoing, Progressing  10/4/2022 1210 by Fely Lanza RN  Outcome: Ongoing, Progressing  Intervention: Promote and Monitor Fetal Wellbeing  Recent Flowsheet Documentation  Taken 10/4/2022 1535 by Fely Lanza RN  Body Position: position changed independently     Problem: Infection (Labor)  Goal: Absence of Infection Signs and Symptoms  Outcome: Ongoing, Progressing  Intervention: Prevent or Manage Infection  Recent Flowsheet Documentation  Taken 10/4/2022 1535 by Fely Lanza RN  Infection Management: aseptic technique maintained     Problem: Labor Pain (Labor)  Goal: Acceptable Pain Control  Outcome: Ongoing, Progressing

## 2022-10-05 ENCOUNTER — APPOINTMENT (OUTPATIENT)
Dept: ULTRASOUND IMAGING | Facility: HOSPITAL | Age: 22
End: 2022-10-05
Attending: OBSTETRICS & GYNECOLOGY
Payer: COMMERCIAL

## 2022-10-05 LAB — T PALLIDUM AB SER QL: NONREACTIVE

## 2022-10-05 PROCEDURE — 76816 OB US FOLLOW-UP PER FETUS: CPT

## 2022-10-05 PROCEDURE — 258N000003 HC RX IP 258 OP 636: Performed by: FAMILY MEDICINE

## 2022-10-05 PROCEDURE — 120N000001 HC R&B MED SURG/OB

## 2022-10-05 PROCEDURE — 76819 FETAL BIOPHYS PROFIL W/O NST: CPT

## 2022-10-05 PROCEDURE — 250N000011 HC RX IP 250 OP 636: Performed by: FAMILY MEDICINE

## 2022-10-05 RX ADMIN — AMPICILLIN 2 G: 2 INJECTION, POWDER, FOR SOLUTION INTRAVENOUS at 07:20

## 2022-10-05 RX ADMIN — AMPICILLIN 2 G: 2 INJECTION, POWDER, FOR SOLUTION INTRAVENOUS at 13:19

## 2022-10-05 RX ADMIN — AMPICILLIN 2 G: 2 INJECTION, POWDER, FOR SOLUTION INTRAVENOUS at 19:34

## 2022-10-05 RX ADMIN — SODIUM CHLORIDE, POTASSIUM CHLORIDE, SODIUM LACTATE AND CALCIUM CHLORIDE: 600; 310; 30; 20 INJECTION, SOLUTION INTRAVENOUS at 19:45

## 2022-10-05 ASSESSMENT — ACTIVITIES OF DAILY LIVING (ADL)
ADLS_ACUITY_SCORE: 18

## 2022-10-05 NOTE — PLAN OF CARE
Problem: Plan of Care - These are the overarching goals to be used throughout the patient stay.    Goal: Plan of Care Review/Shift Note  Description: The Plan of Care Review/Shift note should be completed every shift.  The Outcome Evaluation is a brief statement about your assessment that the patient is improving, declining, or no change.  This information will be displayed automatically on your shift note.  10/4/2022 2356 by Linsey Acosta RN  Outcome: Ongoing, Progressing  Flowsheets (Taken 10/4/2022 2356)  Plan of Care Reviewed With:   patient   significant other  Overall Patient Progress: no change  10/4/2022 2054 by Linsey Acosta RN  Outcome: Ongoing, Progressing     Problem: Plan of Care - These are the overarching goals to be used throughout the patient stay.    Goal: Optimal Comfort and Wellbeing  10/4/2022 2356 by Linsey Acosta RN  Outcome: Ongoing, Progressing  10/4/2022 2054 by Linsey Acosta RN  Outcome: Ongoing, Progressing  Intervention: Provide Person-Centered Care  Recent Flowsheet Documentation  Taken 10/4/2022 2340 by Linsey Acosta RN  Trust Relationship/Rapport:   care explained   choices provided   empathic listening provided   questions encouraged   questions answered   reassurance provided   thoughts/feelings acknowledged  Taken 10/4/2022 1959 by Linsey Acosta RN  Trust Relationship/Rapport:   care explained   choices provided   empathic listening provided   questions answered   questions encouraged   reassurance provided   thoughts/feelings acknowledged   emotional support provided     Problem:  Labor  Goal: Delayed  Delivery  10/4/2022 2356 by Linsey Acosta RN  Outcome: Ongoing, Progressing  10/4/2022 2054 by Linsey Acosta RN  Outcome: Ongoing, Progressing  Intervention: Monitor and Manage  Labor  Recent Flowsheet Documentation  Taken 10/4/2022 2340 by Linsey Acosta RN  Body Position: position changed independently  Fetal Wellbeing Promotion:   fetal  heart rate monitored   maternal position adjusted   uterine contraction activity assessed  Taken 10/4/2022 1959 by Linsey Acosta RN  Body Position: position changed independently  Fetal Wellbeing Promotion:   fetal heart rate monitored   maternal position adjusted   uterine contraction activity assessed     Problem: Change in Fetal Wellbeing (Labor)  Goal: Stable Fetal Wellbeing  10/4/2022 2356 by Linsey Acosta RN  Outcome: Ongoing, Progressing  10/4/2022 2054 by Linsey Acosta RN  Outcome: Ongoing, Progressing  Intervention: Promote and Monitor Fetal Wellbeing  Recent Flowsheet Documentation  Taken 10/4/2022 2340 by Linsey Acosta RN  Body Position: position changed independently  Fetal Wellbeing Promotion:   fetal heart rate monitored   maternal position adjusted   uterine contraction activity assessed  Taken 10/4/2022 1959 by Linsey Acosta RN  Body Position: position changed independently  Fetal Wellbeing Promotion:   fetal heart rate monitored   maternal position adjusted   uterine contraction activity assessed   VSS, , reactive; ctx are irregular and mild, pt denies feeling them. Pt refused her IV Ampicillin and is aware that she has a higher risk of infection. States she feels like she has had enough abx. Will offer it again at 0630. Denies pain, NAD.

## 2022-10-05 NOTE — CONSULTS
[facility] Labor and Delivery OB Consult    Gisela Moon MRN# [id number]   Age: 22 year old YOB: 2000     Date of Admission:  10/4/2022    Primary care provider: Cassie Iyer           Chief Complaint:   Gisela Moon is a 22 year old female who is 33w5d pregnant and being admitted for , premature rupture of membranes.  Patient was admitted 10/2-10/3 with  labor and received betamethasone and nifedipine tocolyis.  She presented yesterday with + ROM, currently contractions every 8-14 minutes.         Pregnancy history:     OBSTETRIC HISTORY:    OB History    Para Term  AB Living   2 0 0 0 1 0   SAB IAB Ectopic Multiple Live Births   1 0 0 0 0      # Outcome Date GA Lbr Donald/2nd Weight Sex Delivery Anes PTL Lv   2 Current            1 SAB 2021               EDC: Estimated Date of Delivery: 22    Prenatal Labs:   Lab Results   Component Value Date    AS Negative 10/04/2022    HEPBANG Nonreactive 2022    CHPCRT Negative 2022    GCPCRT Negative 2022    HGB 9.7 (L) 10/04/2022       Active Problem List  Patient Active Problem List   Diagnosis     PCOS (polycystic ovarian syndrome)     Pregnancy, unspecified gestational age      labor      premature rupture of membranes (PPROM) delivered, current hospitalization       Medication Prior to Admission  Medications Prior to Admission   Medication Sig Dispense Refill Last Dose     ferrous sulfate (FEROSUL) 325 (65 Fe) MG tablet Take 1 tablet (325 mg) by mouth daily (with breakfast) 90 tablet 2 Past Week at Unknown time     Prenatal Vit-Fe Fumarate-FA (PRENATAL PLUS) 27-1 MG TABS Take 1 tablet by mouth    Past Week at Unknown time   .        Maternal Past Medical History:     Past Medical History:   Diagnosis Date     Infertility, female      Polycystic ovary syndrome                     Physical Exam:   Vitals were reviewed  Alert, O x 3  CV regular  Resp non  labored  Ab gravid, Fundus NT                       Assessment:   Gisela Moon is a 33w5d pregnant female admitted with PPROM, GBS neg on 10/2, s/p betamethasone on 10/1 and 10/2          Plan:   Admit, no further tocolysis is indicated, continue latency antibiotics, if labors would not stop and if any signs of triple I would deliver. Otherwise candidate for induction at 34 weeks.  Will get BPP and growth USN this am, if reassuring can do NST q shift and TOCO prn.  SCDs to bilateral lower extremities for DVT prophylaxis and encourage mobility.  NICU aware.  All questions answered. Dr. Iyer to continue primary OB care.  We are available if any further questions.    Eugenia Lozano MD

## 2022-10-05 NOTE — PROGRESS NOTES
Maternal Antepartum Progress Note  [facility]  10/5/2022 7:53 AM     ________________________________________________________________________    Assessment:   22 year old  at 33w4d with pregnancy complicated by  labor with previous admission 10/1-10/3 s/p full course of betamethasone for fetal lung maternity and tocolysis with oral nifedipine, now admitted for PPROM, on latency antibiotics. GBS negative. FHT reassuring. Maternal vitals stable.     Active Problems:     premature rupture of membranes (PPROM) delivered, current hospitalization       Plan:     PPROM: s/p recent full course of betamethasone for fetal lung maturity, tocolysis. ROM+ positive, clear fluid.          - Continuous fetal monitoring   - OB consult        - Continue latency antibiotics         - Wet prep, UA unremarkable         - NICU consult previously completed during last admission    Anemia: Hemoglobin 9.7, down from 11.2 three days prior. Possible hemodilution. No active bleeding. Continue to monitor.    Group B strep: negative  ________________________________________________________________________    Subjective:  Patient feels occasional tightening, no increase in intensity   Slept well overnight  Per AM nurse, patient refused IV antibiotic overnight (0042 and 0720 dose)- patient tells me this morning she was concerned about harm to the baby, since she already had doses of IV penicillin during previous admission.    Objective:  Patient Vitals for the past 24 hrs:   BP Temp Temp src Pulse Resp SpO2   10/05/22 0614 102/69 97.9  F (36.6  C) Oral -- 16 --   10/05/22 0350 -- 98  F (36.7  C) Oral -- -- --   10/05/22 0237 -- 98.1  F (36.7  C) Oral -- -- --   10/05/22 0040 -- 97.8  F (36.6  C) Oral -- -- --   10/04/22 2340 97/52 98.1  F (36.7  C) Oral -- 16 --   10/04/22 2100 -- 98  F (36.7  C) Oral -- -- --   10/04/22 1959 102/61 98.1  F (36.7  C) Oral -- 16 --   10/04/22 1834 --  98.1  F (36.7  C) Oral -- -- --   10/04/22 1724 -- 98.7  F (37.1  C) Oral -- -- --   10/04/22 1645 -- 98  F (36.7  C) Oral -- -- --   10/04/22 1535 109/63 98.4  F (36.9  C) Oral -- 15 98 %   10/04/22 1506 94/54 98.1  F (36.7  C) Oral -- 16 --   10/04/22 1318 -- 98.2  F (36.8  C) Oral -- -- --   10/04/22 1204 -- 98.2  F (36.8  C) Oral -- -- --   10/04/22 1053 98/56 98.5  F (36.9  C) Oral 67 16 --   10/04/22 1050 98/56 -- -- -- -- --     General: Alert, comfortable.  Abdomen: Gravid, non-tender    Recent Results (from the past 24 hour(s))   Rupture of Fetal Membranes by ROM Plus    Collection Time: 10/04/22 11:04 AM   Result Value Ref Range    Rupture of Fetal Membranes by ROM Plus Positive (A) Negative, Invalid, Suggest Repeat   UA reflex to Microscopic and Culture    Collection Time: 10/04/22 11:18 AM    Specimen: Urine, Clean Catch   Result Value Ref Range    Color Urine Colorless Colorless, Straw, Light Yellow, Yellow    Appearance Urine Clear Clear    Glucose Urine Negative Negative mg/dL    Bilirubin Urine Negative Negative    Ketones Urine Negative Negative mg/dL    Specific Gravity Urine 1.004 1.001 - 1.030    Blood Urine Negative Negative    pH Urine 7.0 5.0 - 7.0    Protein Albumin Urine Negative Negative mg/dL    Urobilinogen Urine <2.0 <2.0 mg/dL    Nitrite Urine Negative Negative    Leukocyte Esterase Urine 25 Jeramy/uL (A) Negative    RBC Urine 0 <=2 /HPF    WBC Urine 1 <=5 /HPF    Squamous Epithelials Urine <1 <=1 /HPF   Wet preparation    Collection Time: 10/04/22 11:19 AM    Specimen: Vagina; Swab   Result Value Ref Range    Trichomonas Absent Absent    Yeast Absent Absent    Clue Cells Absent Absent    WBCs/high power field 1+ (A) None   Asymptomatic COVID-19 Virus (Coronavirus) by PCR Nasopharyngeal    Collection Time: 10/04/22 11:42 AM    Specimen: Nasopharyngeal; Swab   Result Value Ref Range    SARS CoV2 PCR Negative Negative   Treponema Abs w Reflex to RPR and Titer    Collection Time: 10/04/22  12:02 PM   Result Value Ref Range    Treponema Antibody Total Nonreactive Nonreactive   Adult Type and Screen    Collection Time: 10/04/22 12:02 PM   Result Value Ref Range    ABO/RH(D) B POS     Antibody Screen Negative Negative    SPECIMEN EXPIRATION DATE [id number]    CBC with platelets and differential    Collection Time: 10/04/22 12:02 PM   Result Value Ref Range    WBC Count 9.5 4.0 - 11.0 10e3/uL    RBC Count 3.30 (L) 3.80 - 5.20 10e6/uL    Hemoglobin 9.7 (L) 11.7 - 15.7 g/dL    Hematocrit 30.4 (L) 35.0 - 47.0 %    MCV 92 78 - 100 fL    MCH 29.4 26.5 - 33.0 pg    MCHC 31.9 31.5 - 36.5 g/dL    RDW 13.2 10.0 - 15.0 %    Platelet Count 156 150 - 450 10e3/uL   Manual Differential    Collection Time: 10/04/22 12:02 PM   Result Value Ref Range    % Neutrophils 82 %    % Lymphocytes 10 %    % Monocytes 8 %    % Eosinophils 0 %    % Basophils 0 %    Absolute Neutrophils 7.8 1.6 - 8.3 10e3/uL    Absolute Lymphocytes 1.0 0.8 - 5.3 10e3/uL    Absolute Monocytes 0.8 0.0 - 1.3 10e3/uL    Absolute Eosinophils 0.0 0.0 - 0.7 10e3/uL    Absolute Basophils 0.0 0.0 - 0.2 10e3/uL    RBC Morphology Confirmed RBC Indices     Platelet Assessment  Automated Count Confirmed. Platelet morphology is normal.     Automated Count Confirmed. Platelet morphology is normal.      Completed by:   Cassie Iyer MD, M.D.  [facility]  10/5/2022 7:53 AM   used: Not needed.

## 2022-10-05 NOTE — PLAN OF CARE
"  Problem: Plan of Care - These are the overarching goals to be used throughout the patient stay.    Goal: Plan of Care Review/Shift Note  Description: The Plan of Care Review/Shift note should be completed every shift.  The Outcome Evaluation is a brief statement about your assessment that the patient is improving, declining, or no change.  This information will be displayed automatically on your shift note.  Outcome: Ongoing, Progressing     Problem: Plan of Care - These are the overarching goals to be used throughout the patient stay.    Goal: Patient-Specific Goal (Individualized)  Description: You can add care plan individualizations to a care plan. Examples of Individualization might be:  \"Parent requests to be called daily at 9am for status\", \"I have a hard time hearing out of my right ear\", or \"Do not touch me to wake me up as it startles me\".  Outcome: Ongoing, Progressing  Flowsheets (Taken 10/4/2022 2054)  Anxieties, Fears or Concerns: concerned about delivery of a 34 wk infant     Problem: Plan of Care - These are the overarching goals to be used throughout the patient stay.    Goal: Optimal Comfort and Wellbeing  Outcome: Ongoing, Progressing     Problem:  Labor  Goal: Delayed  Delivery  Outcome: Ongoing, Progressing     Problem: Change in Fetal Wellbeing (Labor)  Goal: Stable Fetal Wellbeing  Outcome: Ongoing, Progressing     Problem: Infection (Labor)  Goal: Absence of Infection Signs and Symptoms  Outcome: Ongoing, Progressing   Pt verbalized concerns of delivering a 34 wk infant and requested to talk with the NNP.  Cassie NNP met with pt and partner answered questions and provided a booklet about NI. NNP encouraged pt and partner to review booklet and call with any further questions or concerns. , category 1 tracing infrequent mild ctx that pt denies feeling and denies pain. VSS, pt will call at night when awake for Temp so she is able to sleep. Partner is loving and supportive. " Stable, NAD.

## 2022-10-06 PROCEDURE — 120N000001 HC R&B MED SURG/OB

## 2022-10-06 PROCEDURE — 250N000013 HC RX MED GY IP 250 OP 250 PS 637: Performed by: FAMILY MEDICINE

## 2022-10-06 PROCEDURE — 250N000011 HC RX IP 250 OP 636: Performed by: FAMILY MEDICINE

## 2022-10-06 RX ADMIN — AMOXICILLIN 250 MG: 250 CAPSULE ORAL at 21:52

## 2022-10-06 RX ADMIN — AMOXICILLIN 250 MG: 250 CAPSULE ORAL at 11:46

## 2022-10-06 RX ADMIN — AMPICILLIN 2 G: 2 INJECTION, POWDER, FOR SOLUTION INTRAVENOUS at 01:12

## 2022-10-06 RX ADMIN — AMPICILLIN 2 G: 2 INJECTION, POWDER, FOR SOLUTION INTRAVENOUS at 07:28

## 2022-10-06 ASSESSMENT — ACTIVITIES OF DAILY LIVING (ADL)
ADLS_ACUITY_SCORE: 18

## 2022-10-06 NOTE — PROVIDER NOTIFICATION
Dr. Iyer updated via phone. There was a misunderstanding that Dr. Iyer was coming to bedside to explain BPP and create a plan of care for tonight. It was in fact Dr. Lozano who was to come to bedside. Dr. Lozano will determine continuous EFM or take her off the monitor.  No new orders at this time.

## 2022-10-06 NOTE — PROGRESS NOTES
Gisela is sleeping comfortably on the couch with her , last IV antibiotics hung, Gisela states she is comfortable, she knows how to order breakfast, she is aware that at noon the antibiotics change to orals, and that a 2 hour monitoring session is ordered for this morning, Gisela would like to sleep longer but will call out when she is ready to be on the monitors.

## 2022-10-06 NOTE — PLAN OF CARE
Gisela is doing well. Up ad leydi, denies pain, remains afebrile, continues to leak clear amniotic fluid. Plan to induce labor tomorrow 10/7, she spoke with Dr Langford about induction and her wish is to labor on her own so he gave the up ad leydi order . She has a birthing ball in her room.

## 2022-10-06 NOTE — PROGRESS NOTES
Labor Progress:    Subjective:  Feeling well.  No significant concerns.  No new contractions.  Continues to have some fluid leaking.  Patient wanted to discuss trying to get into labor naturally without use of Pitocin.    Objective:  /61 (BP Location: Left arm)   Pulse 87   Temp 98.1  F (36.7  C) (Oral)   Resp 16   LMP 2022 (Exact Date)   SpO2 98%   Breastfeeding Yes   GEN: alert, not distressed  FHT: baseline 130, moderate variability, has accelerations, no decelerations  Cervix:  Not rechecked  TOCO: ctx q 5-7 min    Assessment:  22 year old  at 33w6d   PPROM    Plan:  Primary FM and OBGYN have recommended delivery at 34 weeks.  We discussed pitocin and associated risks.  We discussed risks associated with PPROM and prolonging pregnancy.  Patient mostly interested in coming off bed rest and increasing her activity to see if she goes into spontaneous labor.  This seems reasonable and we will liberalize her activity, and attempt to time things so that if she does spontaneously labor, she will delivery 34 weeks.

## 2022-10-06 NOTE — PLAN OF CARE
BPP earlier today 6/8 with 2 off for practice breathing, normal MVP.  Findings discussed with patient.  NST category one so overall 8/10.  OK to come off monitor overnight.  NST in am.    Eugenia Lozano MD FACOG  [facility]  [phone]

## 2022-10-06 NOTE — PROGRESS NOTES
Requested by Pt RN, Velia, to assess IV as pt reporting pain to site. Site assessed and no redness or swelling. Saline flushed well with pt reporting pain. Blood return present. Flushed IV again and pt winced. Inquired if pain was present with and without fluids infusing and pt thought it was the same. Decision made to replace IV, due to pain, and pt in agreement. #20 gauge IV placed to right lower forearm x1 attempt. Blood return present. Flushed well. Saline locked. Pt stated that it felt better than the other IV. Will continue to monitor patency.Jenise Alexander RN

## 2022-10-07 PROCEDURE — 250N000011 HC RX IP 250 OP 636: Performed by: FAMILY MEDICINE

## 2022-10-07 PROCEDURE — 0HQ9XZZ REPAIR PERINEUM SKIN, EXTERNAL APPROACH: ICD-10-PCS | Performed by: FAMILY MEDICINE

## 2022-10-07 PROCEDURE — 88307 TISSUE EXAM BY PATHOLOGIST: CPT | Mod: TC | Performed by: FAMILY MEDICINE

## 2022-10-07 PROCEDURE — 120N000001 HC R&B MED SURG/OB

## 2022-10-07 PROCEDURE — 258N000003 HC RX IP 258 OP 636: Performed by: FAMILY MEDICINE

## 2022-10-07 PROCEDURE — 250N000009 HC RX 250: Performed by: FAMILY MEDICINE

## 2022-10-07 PROCEDURE — 59400 OBSTETRICAL CARE: CPT | Performed by: FAMILY MEDICINE

## 2022-10-07 PROCEDURE — 88307 TISSUE EXAM BY PATHOLOGIST: CPT | Mod: 26 | Performed by: PATHOLOGY

## 2022-10-07 PROCEDURE — 722N000001 HC LABOR CARE VAGINAL DELIVERY SINGLE

## 2022-10-07 PROCEDURE — 250N000013 HC RX MED GY IP 250 OP 250 PS 637: Performed by: FAMILY MEDICINE

## 2022-10-07 RX ORDER — HYDROCORTISONE 25 MG/G
CREAM TOPICAL 3 TIMES DAILY PRN
Status: DISCONTINUED | OUTPATIENT
Start: 2022-10-07 | End: 2022-10-08 | Stop reason: HOSPADM

## 2022-10-07 RX ORDER — SODIUM CHLORIDE, SODIUM LACTATE, POTASSIUM CHLORIDE, CALCIUM CHLORIDE 600; 310; 30; 20 MG/100ML; MG/100ML; MG/100ML; MG/100ML
INJECTION, SOLUTION INTRAVENOUS CONTINUOUS PRN
Status: DISCONTINUED | OUTPATIENT
Start: 2022-10-07 | End: 2022-10-07 | Stop reason: HOSPADM

## 2022-10-07 RX ORDER — CARBOPROST TROMETHAMINE 250 UG/ML
250 INJECTION, SOLUTION INTRAMUSCULAR
Status: DISCONTINUED | OUTPATIENT
Start: 2022-10-07 | End: 2022-10-08 | Stop reason: HOSPADM

## 2022-10-07 RX ORDER — OXYTOCIN/0.9 % SODIUM CHLORIDE 30/500 ML
340 PLASTIC BAG, INJECTION (ML) INTRAVENOUS CONTINUOUS PRN
Status: DISCONTINUED | OUTPATIENT
Start: 2022-10-07 | End: 2022-10-08 | Stop reason: HOSPADM

## 2022-10-07 RX ORDER — MODIFIED LANOLIN
OINTMENT (GRAM) TOPICAL
Status: DISCONTINUED | OUTPATIENT
Start: 2022-10-07 | End: 2022-10-08 | Stop reason: HOSPADM

## 2022-10-07 RX ORDER — ACETAMINOPHEN 325 MG/1
650 TABLET ORAL EVERY 4 HOURS PRN
Status: DISCONTINUED | OUTPATIENT
Start: 2022-10-07 | End: 2022-10-08 | Stop reason: HOSPADM

## 2022-10-07 RX ORDER — BISACODYL 10 MG
10 SUPPOSITORY, RECTAL RECTAL DAILY PRN
Status: DISCONTINUED | OUTPATIENT
Start: 2022-10-07 | End: 2022-10-08 | Stop reason: HOSPADM

## 2022-10-07 RX ORDER — MISOPROSTOL 200 UG/1
400 TABLET ORAL
Status: DISCONTINUED | OUTPATIENT
Start: 2022-10-07 | End: 2022-10-08 | Stop reason: HOSPADM

## 2022-10-07 RX ORDER — METHYLERGONOVINE MALEATE 0.2 MG/ML
200 INJECTION INTRAVENOUS
Status: DISCONTINUED | OUTPATIENT
Start: 2022-10-07 | End: 2022-10-08 | Stop reason: HOSPADM

## 2022-10-07 RX ORDER — MISOPROSTOL 200 UG/1
800 TABLET ORAL
Status: DISCONTINUED | OUTPATIENT
Start: 2022-10-07 | End: 2022-10-08 | Stop reason: HOSPADM

## 2022-10-07 RX ORDER — OXYTOCIN 10 [USP'U]/ML
10 INJECTION, SOLUTION INTRAMUSCULAR; INTRAVENOUS
Status: DISCONTINUED | OUTPATIENT
Start: 2022-10-07 | End: 2022-10-08 | Stop reason: HOSPADM

## 2022-10-07 RX ORDER — DOCUSATE SODIUM 100 MG/1
100 CAPSULE, LIQUID FILLED ORAL DAILY
Status: DISCONTINUED | OUTPATIENT
Start: 2022-10-08 | End: 2022-10-08 | Stop reason: HOSPADM

## 2022-10-07 RX ORDER — IBUPROFEN 800 MG/1
800 TABLET, FILM COATED ORAL EVERY 6 HOURS PRN
Status: DISCONTINUED | OUTPATIENT
Start: 2022-10-07 | End: 2022-10-08 | Stop reason: HOSPADM

## 2022-10-07 RX ORDER — OXYTOCIN/0.9 % SODIUM CHLORIDE 30/500 ML
1-24 PLASTIC BAG, INJECTION (ML) INTRAVENOUS CONTINUOUS
Status: DISCONTINUED | OUTPATIENT
Start: 2022-10-07 | End: 2022-10-07 | Stop reason: HOSPADM

## 2022-10-07 RX ORDER — LIDOCAINE 40 MG/G
CREAM TOPICAL
Status: DISCONTINUED | OUTPATIENT
Start: 2022-10-07 | End: 2022-10-07 | Stop reason: HOSPADM

## 2022-10-07 RX ADMIN — ACETAMINOPHEN 650 MG: 325 TABLET, FILM COATED ORAL at 21:22

## 2022-10-07 RX ADMIN — LIDOCAINE HYDROCHLORIDE 30 ML: 10 INJECTION, SOLUTION EPIDURAL; INFILTRATION; INTRACAUDAL; PERINEURAL at 20:42

## 2022-10-07 RX ADMIN — AMOXICILLIN 250 MG: 250 CAPSULE ORAL at 09:53

## 2022-10-07 RX ADMIN — Medication 6 MILLI-UNITS/MIN: at 14:55

## 2022-10-07 RX ADMIN — Medication 6 MILLI-UNITS/MIN: at 13:19

## 2022-10-07 RX ADMIN — Medication 340 ML/HR: at 20:43

## 2022-10-07 RX ADMIN — Medication 2 MILLI-UNITS/MIN: at 12:13

## 2022-10-07 RX ADMIN — FENTANYL CITRATE 100 MCG: 50 INJECTION, SOLUTION INTRAMUSCULAR; INTRAVENOUS at 15:17

## 2022-10-07 RX ADMIN — AMOXICILLIN 250 MG: 250 CAPSULE ORAL at 13:56

## 2022-10-07 RX ADMIN — SODIUM CHLORIDE, POTASSIUM CHLORIDE, SODIUM LACTATE AND CALCIUM CHLORIDE: 600; 310; 30; 20 INJECTION, SOLUTION INTRAVENOUS at 12:12

## 2022-10-07 RX ADMIN — FENTANYL CITRATE 100 MCG: 50 INJECTION, SOLUTION INTRAMUSCULAR; INTRAVENOUS at 16:43

## 2022-10-07 RX ADMIN — Medication 8 MILLI-UNITS/MIN: at 13:57

## 2022-10-07 ASSESSMENT — ACTIVITIES OF DAILY LIVING (ADL)
ADLS_ACUITY_SCORE: 18

## 2022-10-07 NOTE — PROGRESS NOTES
Spoke with the pt and Dr. Iyer plan is to induce labor with low dose Pitocin. Will start after pt eats breakfast.

## 2022-10-07 NOTE — PROGRESS NOTES
Progress Note  10/7/2022 4:00 PM  ________________________________________________________________________    ASSESSMENT & PLAN:   22 year old  at 34w0d with pregnancy complicated by  labor with previous admission 10/1-10/3 s/p full course of betamethasone for fetal lung maternity and tocolysis with oral nifedipine, now admitted for PPROM, s/p latency antibiotics, undergoing induction at 34w0d    Continue antibiotics    IV pitocin, titrate as able    IV fentanyl prn    Continuous fetal monitoring    NICU aware- plan to attend delivery due to prematurity  ________________________________________________________________________    SUBJECTIVE:  Patient uncomfortable and breathing through contractions  Requesting pain medication    OBJECTIVE:  /72 (BP Location: Left arm, Patient Position: Semi-Zambrano's, Cuff Size: Adult Regular)   Pulse 102   Temp 97.6  F (36.4  C) (Oral)   Resp 16   LMP 2022 (Exact Date)   SpO2 100%   Breastfeeding Yes   FHR: Baseline: 130 bpm, Variability: Moderate (6 - 25 bpm), Accelerations: Present, Decelerations: no significant   Uterine Contractions:  Regular every 2-3 minutes.  Cervix: dilation 4-5 cm , 90% effaced, soft, and 0 station.  Fluid: Clear.  Fetal Presentation: vertex.  No results found for this or any previous visit (from the past 24 hour(s)).   Completed by:   Cassie Iyer MD  [facility]  10/7/2022 6:06 PM

## 2022-10-07 NOTE — PROGRESS NOTES
"1405: Pt on birthing ball with significant other. Breathing through contractions. Contractions difficult to trace due to maternal positions on the birthing ball.    1430: Pt back to bed. EFM and toco adjusted.     1440: Dr. Iyer on unit, updated on patient.     Vital signs stable Baby cat 1 Pitocin started at 1218 Amoxicillin administered Pt felt a \"pop\" at 1325 was very painful and after the pop, the pain went away.  Would like MD to stop in and say Hi to patient      1450: Patient laying in bed on left side breathing through contractions. EFM and toco adjusted. Contractions difficult to trace. RN stayed at bedside to assess contraction strength and frequncy. Contractions very frequent with less than 1 minute in-between contractions. Palpate moderate. Soft resting tone, and appears calm and rests in-between contractions.  Patient is not feeling pressure, urges to push or poop.     1455: IV pitocin decreased to 6m/U  1500: IV Pitocin decreased to 4m/U    1503: MD at bedside to evaluate   Contractions are too frequent, Pitocin has been decreased. MD okay with Pitocin at 4m/U but may need to decrease again, based off of contractions and increased patient pain.   During recent contractions, pt complaining of increased pain where she felt the \"pop\" earlier. Resting tone soft. Palpated painful site, does appear to increase pain when palpating. Pt is calm and denies feeling pain in-between contractions.     Bedside shift report given to Drake Funez, CLARIBELN, RN                          "

## 2022-10-07 NOTE — PROGRESS NOTES
Data: Afebrile. Leaking small amounts of clear fluid. Contraction pattern not applicable, as patient is not having regular contractions.. Fetal assessment Appropriate for Gestational Age. Signs and symptoms of infection are not present.    Patient is a . Prenatal record reviewed.   Gestational Age 34w0d. VSS. Fetal movement present.     OB History    Para Term  AB Living   2 0 0 0 1 0   SAB IAB Ectopic Multiple Live Births   1 0 0 0 0      # Outcome Date GA Lbr Donald/2nd Weight Sex Delivery Anes PTL Lv   2 Current            1 SAB 2021           . Medical history:   Past Medical History:   Diagnosis Date     Infertility, female      Polycystic ovary syndrome        Patient denies cramping, backache, vaginal discharge, pelvic pressure, UTI symptoms, GI problems, bloody show, vaginal bleeding, edema, headache, visual disturbances, epigastric or URQ pain, and abdominal pain.Support persons Davis present.      Interventions: Monitor vital signs and indicators of infection every 4 hours while awake. Continue uterine/fetal assessment continuously once IV Pitocin is started. Activity level: Bed rest with bathroom privileges. Preventive measures include Medications, Positioning and Frequent voiding. Encourage active range of motion and frequent position changes.    B/P: 105/74, T: 97.9, P: 102, R: 18  VSS  Elevated pulse due to anxiety of beginning IV Pitocin.    Plan: Continue expectant management. Observe for and notify care provider of indicators of progressing labor, signs/symptoms of infection, or fetal/maternal compromise.    Start low dose IV Pitocin and begin induction    Christina Funez, CLARIBELN, RN

## 2022-10-07 NOTE — PROGRESS NOTES
Gisela is doing very well overnight. She is up independently, denies pain. Afebrile, scant clear amniotic fluid continues. Plan is to induce labor today as staff availability. Dr mathews would like a call before patient would like to start induction and provider verify NICU off divert. She slept throughout the night.

## 2022-10-07 NOTE — PROGRESS NOTES
Administered PO Amoxicillin, monitors applied, vss, afebrile. Plan to start Pitocin after pt is done with breakfast, she will call.

## 2022-10-07 NOTE — PROGRESS NOTES
"Pt isn't ready to start induction yet, wants to shower and her  is going to get food and then she will \"call\" when ready.  "

## 2022-10-07 NOTE — PROVIDER NOTIFICATION
"Patient called RN to room @ 1330    Patient said \"I suddenly had a lot of lower abdomen pain and then felt a pop. The pain went away after the pop.\"     Patient up to the bathroom to assess  No bleeding noted  Denies pain  Continues to leak clear amniotic fluid  Fluid did not increase or pool after the described \"pop\"    Patient educated that bag of water can rupture in more than one location. Patient instructed to notify RN with any new changes, pain, etc.       HEATHER Moss, RN      "

## 2022-10-07 NOTE — PLAN OF CARE
MD Iyer at bedside. Pitocin decreased and stopped. Increased cxn noted. IV fentanyl given x2 with some relief. Good spouse support at bedside. Cervix check by MD is 4-5 cm/90/0. NICU charge nurse and Labor and delivery charge aware of cervix change.       Problem: Change in Fetal Wellbeing (Labor)  Goal: Stable Fetal Wellbeing  Outcome: Ongoing, Progressing     Problem: Delayed Labor Progression (Labor)  Goal: Effective Progression to Delivery  Outcome: Ongoing, Progressing     Problem: Infection (Labor)  Goal: Absence of Infection Signs and Symptoms  10/7/2022 1614 by Drake Zarate RN  Outcome: Ongoing, Progressing    Problem: Labor Pain (Labor)  Goal: Acceptable Pain Control  10/7/2022 1614 by Drake Zarate RN  Outcome: Ongoing, Progressing     Problem: Uterine Tachysystole (Labor)  Goal: Normal Uterine Contraction Pattern  10/7/2022 1614 by Drake Zarate RN  Outcome: Ongoing, Progressing     Drake Zarate RN

## 2022-10-07 NOTE — PLAN OF CARE
Problem: Uterine Tachysystole (Labor)  Goal: Normal Uterine Contraction Pattern  Outcome: Ongoing, Progressing  Intervention: Monitor and Manage Uterine Activity  Recent Flowsheet Documentation  Taken 10/7/2022 1128 by Christina Funez, RN  Fluid/Electrolyte Management: fluids provided     Pitocin education given  Patient agrees to IV Pitocin induction  Patient appears comfortable, reports positive fetal movement and denies regular contractions     IV Pitocin 2m/U started infusing at 1218     EFM/TOCO:   Uterine Activity Method: palpation, per patient report, external tocotransducer   Contraction Frequency (min): none   Contraction Duration (sec): 0   Contraction Intensity: no contractions   Uterine Resting Tone: soft by palpation   Fetal HR Assessment Method: external US   Fetal HR A (beats/min): 140   Fetal HR Baseline Rate 'A': normal range   Fetal HR Variability 'A': moderate (amplitude range 6 to 25 bpm)   Fetal HR Accelerations 'A': increase 15 bpm above baseline lasting 15 seconds   Fetal HR Decelerations 'A': absent      HEATHER Moss, RN

## 2022-10-08 VITALS
SYSTOLIC BLOOD PRESSURE: 100 MMHG | TEMPERATURE: 98.1 F | RESPIRATION RATE: 18 BRPM | HEART RATE: 69 BPM | OXYGEN SATURATION: 97 % | DIASTOLIC BLOOD PRESSURE: 54 MMHG

## 2022-10-08 PROCEDURE — 250N000013 HC RX MED GY IP 250 OP 250 PS 637: Performed by: FAMILY MEDICINE

## 2022-10-08 PROCEDURE — 99207 PR SUBSEQUENT HOSPITAL CARE FOR MOTHER, 15 MINUTES: CPT | Performed by: FAMILY MEDICINE

## 2022-10-08 RX ORDER — IBUPROFEN 800 MG/1
800 TABLET, FILM COATED ORAL
Qty: 30 TABLET | Refills: 0 | Status: SHIPPED | OUTPATIENT
Start: 2022-10-08 | End: 2022-10-14

## 2022-10-08 RX ORDER — DOCUSATE SODIUM 100 MG/1
100 CAPSULE, LIQUID FILLED ORAL 2 TIMES DAILY PRN
Qty: 60 CAPSULE | Refills: 0 | Status: SHIPPED | OUTPATIENT
Start: 2022-10-08 | End: 2022-10-14

## 2022-10-08 RX ADMIN — DOCUSATE SODIUM 100 MG: 100 CAPSULE, LIQUID FILLED ORAL at 09:31

## 2022-10-08 RX ADMIN — ACETAMINOPHEN 650 MG: 325 TABLET, FILM COATED ORAL at 02:47

## 2022-10-08 RX ADMIN — ACETAMINOPHEN 650 MG: 325 TABLET, FILM COATED ORAL at 09:31

## 2022-10-08 RX ADMIN — IBUPROFEN 800 MG: 800 TABLET ORAL at 02:47

## 2022-10-08 RX ADMIN — IBUPROFEN 800 MG: 800 TABLET ORAL at 09:31

## 2022-10-08 ASSESSMENT — ACTIVITIES OF DAILY LIVING (ADL)
ADLS_ACUITY_SCORE: 18

## 2022-10-08 NOTE — PROGRESS NOTES
Vaginal Delivery Postpartum Day 1    Patient Name:  Gisela Moon  :      2000  MRN:     [id number]    Subjective:  Feeling well.  Lochia decreasing.   Pain is well controlled.  Voiding without difficulty, tolerating normal diet, and passing flatus.   The patient has no emotional concerns.  The baby is in the NICU.    Objective:  Patient Vitals for the past 24 hrs:   BP Temp Temp src Pulse Resp SpO2   10/08/22 0815 100/54 98.1  F (36.7  C) Oral 69 18 97 %   10/07/22 2335 119/68 97.7  F (36.5  C) Oral 75 16 99 %   10/07/22 2252 123/84 -- -- -- -- --   10/07/22 2237 120/75 -- -- -- -- --   10/07/22 2222 112/67 -- -- -- -- --   10/07/22 2207 116/70 -- -- -- -- --   10/07/22 2152 112/69 -- -- -- -- --   10/07/22 2137 113/64 -- -- -- -- --   10/07/22 2122 120/67 -- -- -- -- --   10/07/22 2107 120/65 -- -- -- -- --   10/07/22 2052 119/61 -- -- -- -- --   10/07/22 2049 -- 97.6  F (36.4  C) Oral -- 18 --   10/07/22 1800 -- 97.6  F (36.4  C) Oral -- -- --   10/07/22 1700 -- 97.4  F (36.3  C) Oral -- -- --   10/07/22 1600 -- 98  F (36.7  C) Oral -- -- --   10/07/22 1551 112/72 -- -- -- 16 100 %   10/07/22 1515 -- 97.7  F (36.5  C) Oral -- -- --   10/07/22 1357 -- 97.4  F (36.3  C) Oral -- -- --   10/07/22 1320 -- 97.9  F (36.6  C) Oral -- -- --   10/07/22 1128 105/74 97.9  F (36.6  C) Oral 102 18 100 %      GEN: alert, oriented, not distressed  ABD: fundus firm at -1 / u  EXT: no calf tenderness or edema  Urinary output is adequate.     Immunization History   Administered Date(s) Administered     COVID-19,PF,Pfizer (12+ Yrs) 07/15/2021, 2021     DTAP (<7y) 2004     DTaP / Hep B / IPV 2005     Hep B, Peds or Adolescent 2004     HepA-Peds, Unspecified 2005     HepB, Unspecified 2004     Hib, Unspecified 2005, 2005     Influenza Intranasal Vaccine 2013     MMR 2004, 2004     Meningococcal (Menactra ) 2014     Polio, Unspecified  2004,  08/23/2005     Poliovirus, inactivated (IPV) 11/05/2004     TRIHIBIT (DTAP/HIB, <7y) 11/05/2004     Tdap (Adacel,Boostrix) 03/28/2012, 09/02/2022     Varicella 05/25/2004, 05/03/2010       Assessment:   Post partum day 1.  Doing well.    Plan:    Continue current care.  Mother hoping to discharge to home, today.

## 2022-10-08 NOTE — PLAN OF CARE
Problem: Plan of Care - These are the overarching goals to be used throughout the patient stay.    Goal: Plan of Care Review/Shift Note  Description: The Plan of Care Review/Shift note should be completed every shift.  The Outcome Evaluation is a brief statement about your assessment that the patient is improving, declining, or no change.  This information will be displayed automatically on your shift note.  Outcome: Ongoing, Progressing  Flowsheets (Taken 10/8/2022 0257)  Plan of Care Reviewed With:   patient   significant other     Problem: Pain (Postpartum Vaginal Delivery)  Goal: Acceptable Pain Control  Outcome: Ongoing, Progressing   Moon's vitals and Post-partum assessment are within normal limit. She received Tylenol 650 mg and Motrin 800 mg for perineal pain. She rates pain 6/10. Dermoplast spray and witch hazal pads applied to perineum for comfort. She was assisted via Wheel Chair to NBICU to visit baby. She continue to pump for baby Thelma Herrera RN

## 2022-10-08 NOTE — DISCHARGE INSTRUCTIONS
"  Warning Signs after Having a Baby (Postpartum)  Keep this paper on your fridge or somewhere else where you can see it.  Baby's birth date ___________________ My provider/hospital ___________________________________  The symptoms below can happen to anyone after giving birth. They can be very serious. Call your provider if you have any of these warning signs.   Call 911 if you:  Are thinking about hurting yourself or your baby  Have any signs below in bold and with a star*  Bleeding  Losing too much blood is called a hemorrhage. These are signs of hemorrhage:  Soaking through a pad in less than an hour  Passing blood clots bigger than a golf ball  Mood problems (postpartum depression)  Many women feel sad after having a baby. But for others, mood swings are worse. Call your provider right away if you are:  Feeling so anxious or nervous that you can't care for yourself or your baby  Thinking about hurting yourself or your baby*  High blood pressure (pre-eclampsia)  Even if you didn't have high blood pressure when you were pregnant, you are still at risk for the high blood pressure disease called pre-eclampsia. Your risk can last up to 12 weeks after giving birth.  These are signs of pre-eclampsia:  Severe headache that does not get better after taking medicine*  Seeing spots, flashes of lights, blurry vision or other changes to eyesight*  Pain on your right side under your rib cage  Sudden swelling in the hands and face  Fainting, shaking or other signs of a seizure*  General feeling of \"not feeling right\"*  Infection  Redness around your incision (if you had surgery)  Really bad pain in your bottom if you had stitches  Any yellow, white or green fluid coming from places where you had stitches or surgery  Fever of 100.4 F (38 C) or higher  Blood clots  After you give birth, your body naturally clumps, or clots, its blood to help prevent blood loss (hemorrhage). Sometimes, this can lead to dangerous blood clots. Here " are signs that you may have a blood clot:  Pain in your chest*  Hard to breathe*  A red or swollen spot on your leg that is warm or painful when you touch it  Remember:You know your body. If something doesn't feel right, call your provider.  For informational purposes only. Not to replace the advice of your health care provider. Copyright   2020 [employer]. All rights reserved. Clinically reviewed by ROYAL Walker-OB, MSN. [employer] [id number] - 1/20.  For informational purposes only. Not to replace the advice of your health care provider. Copyright   2020 [employer]. All rights reserved. Clinically reviewed by ROYAL Walker-OB, MSN. [employer] [id number] - 1/20.

## 2022-10-08 NOTE — PLAN OF CARE
Problem: Plan of Care - These are the overarching goals to be used throughout the patient stay.    Goal: Plan of Care Review/Shift Note  Description: The Plan of Care Review/Shift note should be completed every shift.  The Outcome Evaluation is a brief statement about your assessment that the patient is improving, declining, or no change.  This information will be displayed automatically on your shift note.  Outcome: Met   Home this afternoon after visit with Dr Langford. Stable postpartum. Discharge education provided. Patient verbalized understanding.

## 2022-10-08 NOTE — LACTATION NOTE
This note was copied from a baby's chart.  This writer met with Gisela to rent her a hospital grade breast pump (HGP) as she has a premature infant in NICU.  She states she has not been able to express any colostrum when pumping.  Educated on reverse pressure softening and hand expression.  She allowed this writer to hand express and 0.5 ml colostrum easily expressed in one minute.  Gisela instructed to soften the areolar tissue prior to pumping (using hand expressing) prior to pumping, prn.  Pump at least 8 times in 24 hours.  This writer instructed her on the assembly and cleaning/ sanitizing of the pump kit and the use of the HGP on the initiate and maintain programs.  She signed the non-coverage form, as she was unable to call insurance to check coverage today.  She was instructed to contact lactation when she visits infant, prn.  Gisela verbalizes understanding of all education given.  She denies any further questions.

## 2022-10-08 NOTE — L&D DELIVERY NOTE
OB Vaginal Delivery Note    Gisela Moon MRN# [id number]   Age: 22 year old YOB: 2000       GA: 34w0d  GP:   Labor Complications: None   Delivery QBL: 100 mL  Delivery Type: Vaginal, Spontaneous   ROM to Delivery Time: (Delivered) Days: 3 Hours: 12 Minutes: 37   Weight: 1.99 kg (4 lb 6.2 oz)    1 Minute 5 Minute 10 Minute   Apgar Totals: 7   9        SELVIN RAMIREZ;RUMA PORTILLO;CLOVIS CASEY;NYASIA THRASHER     Delivery Details:  Gisela Moon, a 22 year old  female delivered a viable infant with apgars of 7  and 9 . Patient had  labor and was admitted to hospital 10/1-10/3 and received tocolysis with oral nifedipine and full course of betamethasone. She was discharged home but returned with PPROM and received latency antibiotics. She was induced at 34w0d with IV pitocin. Due to frequency of contractions, pitocin was discontinued and she progressed to complete without augmentation. She received 2 doses of IV fentanyl prior to delivery. Delivery team was called to delivery due to prematurity. FHT was category 1 throughout labor, but few deep variable decelerations for 3 contractions prior to delivery with recovery. Delivery was via vaginal, spontaneous  to a sterile field under intravenous  anesthesia. Infant delivered in vertex    occiput  anterior  position. Nuchal hand and nuchal cord noted and delivered through. Anterior and posterior shoulders delivered without difficulty. The cord was clamped x 2 and cut by father of baby and 3 vessels  were noted. Cord blood was obtained in routine fashion with the following disposition: collected.      Cord complications: nuchal   Placenta delivered at 10/7/2022  8:37 PM . Placental disposition was Pathology . Fundal massage performed and fundus found to be firm.     Episiotomy: none    Perineum, vagina, cervix were inspected, and the following lacerations were noted:   Perineal lacerations: none         vaginal  laceration- left side required repair with 3-0 vicryl      Any lacerations were repaired in the usual fashion using 3-0 vicryl    Excellent hemostasis was noted. Needle count correct. Infant and patient in delivery room in good and stable condition.        Fernando Moon [[id number]]    Labor Event Times    Labor onset date: 10/7/22 Onset time: 12:18 PM      Labor Events     labor?: Yes   steroids: Full Course  Labor Type: Induction/Cervical ripening, Spontaneous  Predominate monitoring during 1st stage: continuous electronic fetal monitoring     Antibiotics received during labor?: No     Rupture date/time: 10/4/22 0755   Rupture type: Spontaneous rupture of membranes occuring during spontaneous labor or augmentation  Fluid color: Clear  Fluid odor: Normal     Induction: Oxytocin  Induction date/time: 10/7/22 1218   Cervical ripening date/time: 10/7/22 1218   Indications for induction: Prolonged ROM     Augmentation: None  1:1 continuous labor support provided by?: RN       Delivery/Placenta Date and Time    Delivery Date: 10/7/22 Delivery Time:  8:32 PM   Placenta Date/Time: 10/7/2022  8:37 PM  Oxytocin given at the time of delivery: after delivery of baby  Delivering clinician: Seun Iyer MD   Other personnel present at delivery:  Provider Role   Livia Mtz, RN Delivery Nurse   Sayra Rodríguez RN Delivery Assist   Josee Verduzco RN Twohy, Timothy P, RN          Vaginal Counts     Initial count performed by 2 team members:  Two Team Members   seun Rodgers       Needles Suture Needles Sponges (RETIRED) Instruments   Initial counts 0 0 5    Added to count 1 1     Relief counts       Final counts 1 1 51          Placed during labor Accounted for at the end of labor   FSE NA NA   IUPC NA NA   Cervidil NA NA              Final count performed by 2 team members:  Two Team Members   bisi mtz         Apgars    Living status: Living   1 Minute 5 Minute 10  Minute 15 Minute 20 Minute   Skin color: 0  1       Heart rate: 2  2       Reflex irritability: 1  2       Muscle tone: 2  2       Respiratory effort: 2  2       Total: 7  9       Apgars assigned by: FORD BARKLEY CNP     Cord    Vessels: 3 Vessels    Cord Complications: Nuchal   Nuchal Intervention: delivered through         Nuchal cord description: loose nuchal cord         Cord Blood Disposition: Discard    Gases Sent?: Yes    Delayed cord clamping?: Yes    Cord Clamping Delay (seconds):  seconds    Stem cell collection?: No       [place] Measurements    Weight: 4 lb 6.2 oz       Labor Events and Shoulder Dystocia    Fetal Tracing Prior to Delivery: Category 2  Shoulder dystocia present?: Neg     Delivery (Maternal) (Provider to Complete) ([id number])    Episiotomy: None  Perineal lacerations: None    Vaginal laceration?: Yes Repaired?: Yes   Repair suture: 3-0 Vicryl  Genital tract inspection done: Pos     Blood Loss  Mother: Gisela Moon #[id number]   Start of Mother's Information    Delivery Blood Loss  10/07/22 1218 - 10/07/22 2104    Delivery QBL (mL) Hospital Encounter 100 mL    Total  100 mL         End of Mother's Information  Mother: Gisela Moon #[id number]          Delivery - Provider to Complete ([id number])    Delivering clinician: Cassie Iyer MD  Attempted Delivery Types (Choose all that apply): Spontaneous Vaginal Delivery  Delivery Type (Choose the 1 that will go to the Birth History): Vaginal, Spontaneous                   Other personnel:  Provider Role   Livia Mtz RN Delivery Nurse   Sayra Rodríguez RN Delivery Assist   Josee Verduzco RN Twohy, Timothy P, RN                 Placenta    Date/Time: 10/7/2022  8:37 PM  Removal: Spontaneous  Disposition: Pathology           Anesthesia    Method: INTRAVENOUS                 Presentation and Position    Presentation: Vertex     Occiput Anterior                 Cassie Iyer MD

## 2022-10-08 NOTE — PLAN OF CARE
Care Plan Progress Note      Name: Gisela Moon  : 2000  MRN: [id number]    VS: /64   Pulse 102   Temp 97.6  F (36.4  C) (Oral)   Resp 18   LMP 2022 (Exact Date)   SpO2 100%   Breastfeeding Yes       Problem:  Labor  Goal: Delayed  Delivery  Outcome: Met     Problem: Bleeding (Labor)  Goal: Hemostasis  Outcome: Met     Problem: Change in Fetal Wellbeing (Labor)  Goal: Stable Fetal Wellbeing  Outcome: Met     Problem: Delayed Labor Progression (Labor)  Goal: Effective Progression to Delivery  Outcome: Met     Problem: Infection (Labor)  Goal: Absence of Infection Signs and Symptoms  Outcome: Met     Problem: Labor Pain (Labor)  Goal: Acceptable Pain Control  Outcome: Met     Problem: Uterine Tachysystole (Labor)  Goal: Normal Uterine Contraction Pattern  Outcome: Met   VSS, delivered baby boy @.  Fundus firm, QBL under 300 currently.  Pain controlled with po tylenol.  IV pitocin infusing.  DTV, baby in NICU        Livia Mtz RN  10/7/2022  9:53 PM

## 2022-10-09 NOTE — DISCHARGE SUMMARY
Vaginal Delivery Postpartum Day 1/Discharge Summary    Patient Name:  Gisela Moon  :      2000  MRN:      [id number]    Admission Date:  10/4/2022  Delivery Date:  10/7/2022  Gestational Age at Delivery:  34w0d  Discharge Date:  10/8/2022    Subjective:  Patient has no concerns.  Lochia is decreasing.  Pain is well controlled.  Eating and ambulating well.  Normal urine output.   The baby is in the NICU at 34 weeks.    Discharge Exam:    Patient Vitals for the past 24 hrs:   BP Temp Temp src Pulse Resp SpO2   10/08/22 0815 100/54 98.1  F (36.7  C) Oral 69 18 97 %   10/07/22 2335 119/68 97.7  F (36.5  C) Oral 75 16 99 %   10/07/22 2252 123/84 -- -- -- -- --   10/07/22 2237 120/75 -- -- -- -- --   10/07/22 2222 112/67 -- -- -- -- --   10/07/22 2207 116/70 -- -- -- -- --   10/07/22 2152 112/69 -- -- -- -- --   10/07/22 2137 113/64 -- -- -- -- --   10/07/22 2122 120/67 -- -- -- -- --   10/07/22 2107 120/65 -- -- -- -- --   10/07/22 2052 119/61 -- -- -- -- --   10/07/22 2049 -- 97.6  F (36.4  C) Oral -- 18 --      See exam from earlier today    Immunization History   Administered Date(s) Administered     COVID-19,PF,Pfizer (12+ Yrs) 07/15/2021, 2021     DTAP (<7y) 2004     DTaP / Hep B / IPV 2005     Hep B, Peds or Adolescent 2004     HepA-Peds, Unspecified 2005     HepB, Unspecified 2004     Hib, Unspecified 2005, 2005     Influenza Intranasal Vaccine 2013     MMR 2004, 2004     Meningococcal (Menactra ) 2014     Polio, Unspecified  2004, 2005     Poliovirus, inactivated (IPV) 2004     TRIHIBIT (DTAP/HIB, <7y) 2004     Tdap (Adacel,Boostrix) 2012, 2022     Varicella 2004, 2010       Principal Diagnosis:  Well ppd #1, s/p NVD  PPROM.  Induced at 34 weeks.    Patient Active Problem List   Diagnosis     PCOS (polycystic ovarian syndrome)     Pregnancy, unspecified gestational age       labor      premature rupture of membranes (PPROM) delivered, current hospitalization      (normal spontaneous vaginal delivery)       Procedure(s) Performed:    Indication for Induction:  PPROM    Plan:  Discharge to home.  Follow up with Dr. Iyer in 2 and 6 weeks  Patient Instructions:      Physical activity: at tolerated    Diet:  As tolerated  She will manage post partum contraception from clinic.    Discharge Medications:      Medication List      Started    docusate sodium 100 MG capsule  Commonly known as: COLACE  100 mg, Oral, 2 TIMES DAILY PRN     ibuprofen 800 MG tablet  Commonly known as: ADVIL/MOTRIN  800 mg, Oral, ONCE PRN

## 2022-10-10 ENCOUNTER — LACTATION ENCOUNTER (OUTPATIENT)
Age: 22
End: 2022-10-10

## 2022-10-10 NOTE — LACTATION NOTE
This note was copied from a baby's chart.  This writer met with Gisela in the NICU per her request.  She had several questions regarding pumping her breasts.  Her milk is just starting to come in, as her breasts are becoming firm and she was able to express 15 ml at the last pumping session.  She is using the 24 mm flange.  All of her questions were answered and she was encouraged to continue to pump at least 8 times in 24 hours.  We reviewed reverse pressure softening and hand expression.  Education provided on how to use the Maintain program on the hospital grade breast pump.  She verbalizes understanding of all education given.  She denies any further questions.  She will call for lactation prn.

## 2022-10-12 LAB
PATH REPORT.COMMENTS IMP SPEC: NORMAL
PATH REPORT.COMMENTS IMP SPEC: NORMAL
PATH REPORT.FINAL DX SPEC: NORMAL
PATH REPORT.GROSS SPEC: NORMAL
PATH REPORT.MICROSCOPIC SPEC OTHER STN: NORMAL
PATH REPORT.RELEVANT HX SPEC: NORMAL
PHOTO IMAGE: NORMAL

## 2022-10-14 ENCOUNTER — PRENATAL OFFICE VISIT (OUTPATIENT)
Dept: FAMILY MEDICINE | Facility: CLINIC | Age: 22
End: 2022-10-14
Payer: COMMERCIAL

## 2022-10-14 VITALS
TEMPERATURE: 99 F | DIASTOLIC BLOOD PRESSURE: 66 MMHG | RESPIRATION RATE: 16 BRPM | SYSTOLIC BLOOD PRESSURE: 92 MMHG | WEIGHT: 137.5 LBS | BODY MASS INDEX: 26.85 KG/M2 | HEART RATE: 77 BPM | OXYGEN SATURATION: 99 %

## 2022-10-14 DIAGNOSIS — Z87.59 HISTORY OF PRETERM PREMATURE RUPTURE OF MEMBRANES (PPROM): ICD-10-CM

## 2022-10-14 DIAGNOSIS — Z87.51 HISTORY OF PRETERM DELIVERY: ICD-10-CM

## 2022-10-14 PROBLEM — O42.919 PRETERM PREMATURE RUPTURE OF MEMBRANES (PPROM) DELIVERED, CURRENT HOSPITALIZATION: Status: RESOLVED | Noted: 2022-10-04 | Resolved: 2022-10-14

## 2022-10-14 PROBLEM — O60.00 PRETERM LABOR: Status: RESOLVED | Noted: 2022-10-01 | Resolved: 2022-10-14

## 2022-10-14 PROBLEM — Z34.90 PREGNANCY, UNSPECIFIED GESTATIONAL AGE: Status: RESOLVED | Noted: 2022-09-02 | Resolved: 2022-10-14

## 2022-10-14 PROCEDURE — 99024 POSTOP FOLLOW-UP VISIT: CPT | Performed by: FAMILY MEDICINE

## 2022-10-14 NOTE — PROGRESS NOTES
Assessment/Plan:     Gisela Fuller was seen today for post partum.    Diagnoses and all orders for this visit:    Routine postpartum follow-up    History of  delivery    History of  premature rupture of membranes (PPROM)        Anemia:  Asymptomatic, okay to discontinue oral iron. Continue PNV  Breastfeeding/Bottle feeding:  Patient is pumping every 2-3 hours and bring expressed breast milk to hospital for  infant  Contraception:   Unsure- probably will use condoms. Discussed birth spacing- wants to wait 2-3 years for another baby.  Depression:   [facility]  Exercise:   Encouraged increasing exercise based on how she is feeling.  Healthcare maintenance:   Up to date  Immunizations:    Immunizations needed; declines today    Reviewed recommendations for Covid-19 booster and flu shot, especially due to baby being in NICU             Subjective:      Gisela Moon is a 22 year old female who presents for a postpartum visit.   She is 2 weeks postpartum following a  delivery at 34 weeks after PPROM and  labor at 33 weeks.   I have fully reviewed the prenatal and intrapartum course.   Delivery notes below  Postpartum course has been Unremarkable.  Baby's course has been Complicated by NICU stay for prematurity.  Periods:  Still bleeding Patient's last menstrual period was 2022 (exact date).   Bowel or bladder concerns: denies  Patient has not resumed intercourse.    [place]  Depression Scale: see flowsheet     Last hgb on file:    Lab Results   Component Value Date    HGB 9.7 (L) 10/04/2022        The following portions of the patient's history were reviewed and updated as appropriate: allergies, current medications and problem list     OB History    Para Term  AB Living   2 1 0 1 1 1   SAB IAB Ectopic Multiple Live Births   1 0 0 0 1      # Outcome Date GA Lbr Donald/2nd Weight Sex Delivery Anes PTL Lv   2  10/07/22 34w0d 07:45 /  00:29 1.99 kg (4 lb 6.2 oz) M Vag-Spont IV Y JENNIFER      Name: FERNANDO LOPEZ      Apgar1: 7  Apgar5: 9   1 2021             Information for the patient's :  Fernando Lopez [[id number]]   Fernando Lopez       Objective:      BP 92/66   Pulse 77   Temp 99  F (37.2  C) (Temporal)   Resp 16   Wt 62.4 kg (137 lb 8 oz)   LMP 2022 (Exact Date)   SpO2 99%   Breastfeeding Yes   BMI 26.85 kg/m    Wt Readings from Last 3 Encounters:   10/14/22 62.4 kg (137 lb 8 oz)   10/01/22 67.1 kg (148 lb)   22 67.2 kg (148 lb 0.6 oz)       Physical Exam:  General Appearance: Alert, cooperative, no distress, appears stated age  Abdomen: Soft, non-tender, no masses, no organomegaly  Pelvic: Well healing perineum

## 2022-10-28 ENCOUNTER — TELEPHONE (OUTPATIENT)
Dept: FAMILY MEDICINE | Facility: CLINIC | Age: 22
End: 2022-10-28

## 2022-10-28 NOTE — TELEPHONE ENCOUNTER
Reason for Call:  Appointment Request    Patient requesting this type of appt:       Requested provider: Cassie Iyer    Reason patient unable to be scheduled: scheduled for 22 with PCP    When does patient want to be seen/preferred time: 1-2 days    Comments: apt listed for mom as TC unable to schedule NB - getting out of NICU.     [facility] or would you prefer to receive a phone call?:   No preference   Okay to leave a detailed message?: No task completed thanks.     Call taken on 10/28/2022 at 4:52 PM by FREDERICK Evans

## 2022-11-02 ENCOUNTER — MEDICAL CORRESPONDENCE (OUTPATIENT)
Dept: HEALTH INFORMATION MANAGEMENT | Facility: CLINIC | Age: 22
End: 2022-11-02

## 2022-11-03 ENCOUNTER — MYC MEDICAL ADVICE (OUTPATIENT)
Dept: FAMILY MEDICINE | Facility: CLINIC | Age: 22
End: 2022-11-03

## 2022-11-03 NOTE — TELEPHONE ENCOUNTER
Patient given discharge instructions by RN. Discharge education : Diagnostic tests were reviewed and questions answered. Diagnosis, care plan and treatment options were discussed. The parent understands instructions and will follow up as directed. Patient education given on stitches and glue and the parent expresses understanding and acceptance of instructions.  Tamica Augustin RN 9/12/2017 11:18 AM Please review patient message via [employer] reporting vaginal bleeding after sexual intercourse post partum.    Thank you    J Luis

## 2022-11-07 NOTE — TELEPHONE ENCOUNTER
Dr. Iyer- pt states the bleeding has slowed down from yesterday. Do armida have any other recommendation?     Are you okay with keeping 11/23 appt?    Chauncey Dubose, BSN RN  [facility]

## 2022-11-07 NOTE — TELEPHONE ENCOUNTER
Writer responded via [employer].    Chauncey Dubose, BSN RN  [facility]

## 2022-11-23 ENCOUNTER — PRENATAL OFFICE VISIT (OUTPATIENT)
Dept: FAMILY MEDICINE | Facility: CLINIC | Age: 22
End: 2022-11-23
Payer: COMMERCIAL

## 2022-11-23 ENCOUNTER — MEDICAL CORRESPONDENCE (OUTPATIENT)
Dept: HEALTH INFORMATION MANAGEMENT | Facility: CLINIC | Age: 22
End: 2022-11-23

## 2022-11-23 VITALS
TEMPERATURE: 98.2 F | OXYGEN SATURATION: 99 % | WEIGHT: 130 LBS | DIASTOLIC BLOOD PRESSURE: 66 MMHG | SYSTOLIC BLOOD PRESSURE: 100 MMHG | BODY MASS INDEX: 25.52 KG/M2 | HEART RATE: 72 BPM | HEIGHT: 60 IN

## 2022-11-23 DIAGNOSIS — K64.4 EXTERNAL HEMORRHOIDS: ICD-10-CM

## 2022-11-23 PROCEDURE — 99207 PR POST PARTUM EXAM: CPT | Performed by: FAMILY MEDICINE

## 2022-11-23 RX ORDER — HYDROCORTISONE 25 MG/G
CREAM TOPICAL 2 TIMES DAILY PRN
Qty: 30 G | Refills: 1 | Status: SHIPPED | OUTPATIENT
Start: 2022-11-23 | End: 2023-07-26

## 2022-11-23 RX ORDER — DOCUSATE SODIUM 100 MG/1
100 CAPSULE, LIQUID FILLED ORAL DAILY
Qty: 60 CAPSULE | Refills: 3 | Status: SHIPPED | OUTPATIENT
Start: 2022-11-23 | End: 2023-07-26

## 2022-11-23 NOTE — PROGRESS NOTES
Assessment/Plan:     Gisela Fuller was seen today for post partum exam.    Diagnoses and all orders for this visit:    Routine postpartum follow-up    External hemorrhoids: Reviewed sitz baths, hemorrhoid cream. Adequate hydration, dietary fiber and fiber supplements, as well as stool softener.   -     docusate sodium (COLACE) 100 MG capsule; Take 1 capsule (100 mg) by mouth daily  -     hydrocortisone, Perianal, (HYDROCORTISONE) 2.5 % cream; Place rectally 2 times daily as needed for hemorrhoids    Anemia:  Asymptomatic- declines checking hemoglobin.  Breastfeeding/Bottle feeding:  Patient is pumping and feeding expressed breast milk but she plans to wean within the next month and transition to formula per preference  Contraception:   Condoms- aware of options  Depression:   [facility]  Exercise:   Encouraged increasing exercise based on how she is feeling.  Healthcare maintenance:   Up to date  Immunizations:    Immunizations needed; declines today             Subjective:      Gisela Moon is a 22 year old female who presents for a postpartum visit.   She is 7 week postpartum following a spontaneous vaginal delivery.   I have fully reviewed the prenatal and intrapartum course.   Delivery notes below  Postpartum course has been Unremarkable.  Baby's course has been Complicated by prematurity.  Periods: No bleeding currently.  Bowel or bladder concerns: pain with bowel movements, sometimes bright red blood  Patient has resumed intercourse. Sometimes pain with beginning of intercourse but that resolves.   [place]  Depression Scale: see flowsheet    Last hgb on file:    Lab Results   Component Value Date    HGB 9.7 (L) 10/04/2022      The following portions of the patient's history were reviewed and updated as appropriate: allergies, current medications and problem list     OB History    Para Term  AB Living   2 1 0 1 1 1   SAB IAB Ectopic Multiple Live Births   1 0 0 0  1      # Outcome Date GA Lbr Donald/2nd Weight Sex Delivery Anes PTL Lv   2  10/07/22 34w0d 07:45 / 00:29 1.99 kg (4 lb 6.2 oz) M Vag-Spont IV Y JENNIFER      Name: JOHNMALE-GURWINDER PATTON      Apgar1: 7  Apgar5: 9   1 SAB 2021             This patient has no babies on file.    Objective:      /66   Pulse 72   Temp 98.2  F (36.8  C) (Oral)   Ht 1.524 m (5')   Wt 59 kg (130 lb)   SpO2 99%   BMI 25.39 kg/m    Wt Readings from Last 3 Encounters:   22 59 kg (130 lb)   10/14/22 62.4 kg (137 lb 8 oz)   10/01/22 67.1 kg (148 lb)     Physical Exam:  General Appearance: Alert, cooperative, no distress, appears stated age  Pelvic:Normally developed genitalia with no external lesions or eruptions. Vagina and cervix show no lesions, inflammation, discharge or tenderness. No cystocele, No rectocele. Uterus normal.  No adnexal mass or tenderness.    Cassie Iyer MD

## 2023-02-20 ENCOUNTER — MYC MEDICAL ADVICE (OUTPATIENT)
Dept: FAMILY MEDICINE | Facility: CLINIC | Age: 23
End: 2023-02-20
Payer: COMMERCIAL

## 2023-05-20 ENCOUNTER — HEALTH MAINTENANCE LETTER (OUTPATIENT)
Age: 23
End: 2023-05-20

## 2023-07-26 ENCOUNTER — OFFICE VISIT (OUTPATIENT)
Dept: FAMILY MEDICINE | Facility: CLINIC | Age: 23
End: 2023-07-26
Payer: COMMERCIAL

## 2023-07-26 VITALS
TEMPERATURE: 98.8 F | DIASTOLIC BLOOD PRESSURE: 70 MMHG | OXYGEN SATURATION: 99 % | RESPIRATION RATE: 16 BRPM | BODY MASS INDEX: 25.15 KG/M2 | SYSTOLIC BLOOD PRESSURE: 109 MMHG | HEART RATE: 64 BPM | HEIGHT: 60 IN | WEIGHT: 128.1 LBS

## 2023-07-26 DIAGNOSIS — Z34.90 PREGNANCY, UNSPECIFIED GESTATIONAL AGE: Primary | ICD-10-CM

## 2023-07-26 DIAGNOSIS — N92.6 MISSED PERIOD: ICD-10-CM

## 2023-07-26 LAB — HCG UR QL: POSITIVE

## 2023-07-26 PROCEDURE — 99213 OFFICE O/P EST LOW 20 MIN: CPT | Performed by: FAMILY MEDICINE

## 2023-07-26 PROCEDURE — 81025 URINE PREGNANCY TEST: CPT | Performed by: FAMILY MEDICINE

## 2023-07-26 NOTE — PROGRESS NOTES
Assessment & Plan     Pregnancy, unspecified gestational age:  at 5w6d by exact LMP. UPT positive. Unplanned pregnancy but plans to continue. Continue PNV. Schedule dating US after 7 weeks. Risk factors: history of PPROM and delivery at 34 weeks.   - US OB < 14 Weeks Single  - HCG qualitative urine  - HCG qualitative urine      Schedule IOB visit around 10-12 weeks gestation. Planning for wet prep, cfDNA         BMI:   Estimated body mass index is 25.02 kg/m  as calculated from the following:    Height as of this encounter: 1.524 m (5').    Weight as of this encounter: 58.1 kg (128 lb 1.6 oz).           MD JENN Del Castillo [facility] LÁZARO Romero Mai is a 23 year old, presenting for the following health issues:  Pregnancy Test      2023     2:03 PM   Additional Questions   Roomed by Gena BARAJAS       History of Present Illness       Reason for visit:  Pregnancy    She eats 2-3 servings of fruits and vegetables daily.She consumes 1 sweetened beverage(s) daily.She exercises with enough effort to increase her heart rate 20 to 29 minutes per day.  She exercises with enough effort to increase her heart rate 4 days per week. She is missing 1 dose(s) of medications per week.     LMP 6/15/23  Exact date  Regular periods- sometimes a few days off  Fatigue  Denies any nausea, vomiting, constipation, urinary frequency  She is taking prenatal vitamins  Pregnancy was unplanned, plans to continue      Review of Systems         Objective    /70   Pulse 64   Temp 98.8  F (37.1  C) (Oral)   Resp 16   Ht 1.524 m (5')   Wt 58.1 kg (128 lb 1.6 oz)   LMP 06/15/2023 (Exact Date)   SpO2 99%   BMI 25.02 kg/m    Body mass index is 25.02 kg/m .  Physical Exam   Gen: well appearing

## 2023-08-02 ENCOUNTER — MYC MEDICAL ADVICE (OUTPATIENT)
Dept: FAMILY MEDICINE | Facility: CLINIC | Age: 23
End: 2023-08-02
Payer: COMMERCIAL

## 2023-08-02 DIAGNOSIS — O26.851 SPOTTING AFFECTING PREGNANCY IN FIRST TRIMESTER: Primary | ICD-10-CM

## 2023-08-02 NOTE — TELEPHONE ENCOUNTER
Called patient. She is having pink tinged discharge when wiping. Occasional low back pain that has resolved. No cramping. No fevers.     Plan for US if her spotting continues. I offered beta HCG but patient declines.     Reviewed warning signs, when to seek care.    Cassie Iyer MD

## 2023-08-28 ENCOUNTER — HOSPITAL ENCOUNTER (OUTPATIENT)
Dept: ULTRASOUND IMAGING | Facility: HOSPITAL | Age: 23
Discharge: HOME | End: 2023-08-28
Attending: FAMILY MEDICINE | Admitting: FAMILY MEDICINE
Payer: COMMERCIAL

## 2023-08-28 ENCOUNTER — TELEPHONE (OUTPATIENT)
Dept: FAMILY MEDICINE | Facility: CLINIC | Age: 23
End: 2023-08-28
Payer: COMMERCIAL

## 2023-08-28 DIAGNOSIS — Z34.90 PREGNANCY, UNSPECIFIED GESTATIONAL AGE: ICD-10-CM

## 2023-08-28 PROCEDURE — 76801 OB US < 14 WKS SINGLE FETUS: CPT

## 2023-08-28 NOTE — TELEPHONE ENCOUNTER
Called radiologist back re: pregnancy failure result on ultrasound    Called patient. Reviewed ultrasound findings. Reviewed options for treatment including expectant management, medical management and surgical management. Patient elects for expectant management and has appointment on Thursday for follow-up. Reviewed warning signs, what to expect.    Cassie Iyer MD

## 2023-08-28 NOTE — TELEPHONE ENCOUNTER
Received call from [facility] regarding urgent ultrasound results.     Radiologist is requesting to speak with Dr. Iyer directly.     Please call radiologist back whenever able: [phone]

## 2023-08-31 ENCOUNTER — OFFICE VISIT (OUTPATIENT)
Dept: FAMILY MEDICINE | Facility: CLINIC | Age: 23
End: 2023-08-31
Payer: COMMERCIAL

## 2023-08-31 VITALS
TEMPERATURE: 98.4 F | WEIGHT: 128.44 LBS | RESPIRATION RATE: 12 BRPM | HEART RATE: 84 BPM | OXYGEN SATURATION: 99 % | DIASTOLIC BLOOD PRESSURE: 62 MMHG | BODY MASS INDEX: 25.22 KG/M2 | SYSTOLIC BLOOD PRESSURE: 98 MMHG | HEIGHT: 60 IN

## 2023-08-31 DIAGNOSIS — O02.0 ANEMBRYONIC PREGNANCY: Primary | ICD-10-CM

## 2023-08-31 DIAGNOSIS — O02.1 MISSED ABORTION: ICD-10-CM

## 2023-08-31 PROCEDURE — 99213 OFFICE O/P EST LOW 20 MIN: CPT | Performed by: FAMILY MEDICINE

## 2023-08-31 NOTE — PROGRESS NOTES
Gisela Fuller was seen today for follow up.    Diagnoses and all orders for this visit:    Anembryonic pregnancy: Gestational sac with no fetal pole or yolk sac, measuring 2.9 cm. Reviewed options for treatment with patient, including expectant, medical or surgical management. She has history of early SAB with expectant management and this is what she prefers. Reviewed we can wait up until 4 weeks for expectant management- she will notify me if she does not pass tissue/pregnancy after 4 weeks and then I would recommend either surgical or medical management. Reviewed warning signs, what to expect, return to fertility post-miscarriage.         Subjective   Gisela is a 23 year old, presenting for the following health issues:  Follow Up (Miscarriage )      8/31/2023     2:15 PM   Additional Questions   Roomed by Cassie Sandy   Accompanied by Self       History of Present Illness       Reason for visit:  Pregnancy    She eats 2-3 servings of fruits and vegetables daily.She consumes 1 sweetened beverage(s) daily.She exercises with enough effort to increase her heart rate 20 to 29 minutes per day.  She exercises with enough effort to increase her heart rate 5 days per week.   She is taking medications regularly.       Had US on 8/28/23 that showed    Single intrauterine gestational sac with mean diameter of 2.9 cm without identifiable embryo, diagnostic of pregnancy failure.     She denies any cramping or spotting/bleeding    Prefers expectant management- she did this previously with her first miscarriage      Review of Systems         Objective    BP 98/62 (BP Location: Right arm, Patient Position: Sitting, Cuff Size: Adult Regular)   Pulse 84   Temp 98.4  F (36.9  C) (Oral)   Resp 12   Ht 1.524 m (5')   Wt 58.3 kg (128 lb 7 oz)   LMP 06/15/2023 (Exact Date)   SpO2 99%   BMI 25.08 kg/m    Body mass index is 25.08 kg/m .  Physical Exam   Gen: well appearing, no distress

## 2023-12-03 ENCOUNTER — MYC MEDICAL ADVICE (OUTPATIENT)
Dept: FAMILY MEDICINE | Facility: CLINIC | Age: 23
End: 2023-12-03
Payer: COMMERCIAL

## 2023-12-08 NOTE — TELEPHONE ENCOUNTER
Writer called patient regarding provider's message below. Provider message relayed to patient.    Patient verbalizes understanding, agrees with plan and has no further questions.    Closing encounter.    CLARIBEL NolascoN, RN   [facility]

## 2023-12-08 NOTE — TELEPHONE ENCOUNTER
Unfortunately, I just don't have the ability to double book patient in within the next few weeks due to rounding week next week and being out of clinic the following week. Please advise patient to continue the treatment with using miralax/stool softeners and increasing water intake and fiber intake- if she has severe pain, then I would advise that she be seen in clinic (with any available provider) or even walk in clinic or urgent care. I apologize for the inconvenience.

## 2023-12-08 NOTE — TELEPHONE ENCOUNTER
"Spoke pt regarding MyChart message. Pt says, \" When having a BM, she feels like her anus is ripping. Stools appear normal.\"    Pt would like to see Dr. Magallon for these symptoms if possible. In the mean time, writer did reiterated Dr. Iyer's care advice from 4 days ago to pt. Recommended pt to try OTC stool softeners, and try sitz baths.    Pt would like to ask Dr. Iyer to see if willing to DB to see patient for ongoing hemorrhoids 2 months now. Pt does not want to see any other provider.    Will route message to Dr. Iyer to review and advise.    CLARIBEL NolascoN, RN   [facility]    "

## 2024-01-10 ENCOUNTER — OFFICE VISIT (OUTPATIENT)
Dept: FAMILY MEDICINE | Facility: CLINIC | Age: 24
End: 2024-01-10
Payer: COMMERCIAL

## 2024-01-10 VITALS
HEART RATE: 59 BPM | WEIGHT: 127.8 LBS | BODY MASS INDEX: 25.09 KG/M2 | RESPIRATION RATE: 20 BRPM | DIASTOLIC BLOOD PRESSURE: 64 MMHG | HEIGHT: 60 IN | OXYGEN SATURATION: 97 % | SYSTOLIC BLOOD PRESSURE: 100 MMHG | TEMPERATURE: 98.7 F

## 2024-01-10 DIAGNOSIS — Z11.3 SCREENING FOR STDS (SEXUALLY TRANSMITTED DISEASES): ICD-10-CM

## 2024-01-10 DIAGNOSIS — K64.4 EXTERNAL HEMORRHOIDS: Primary | ICD-10-CM

## 2024-01-10 PROCEDURE — 87591 N.GONORRHOEAE DNA AMP PROB: CPT | Performed by: FAMILY MEDICINE

## 2024-01-10 PROCEDURE — 87491 CHLMYD TRACH DNA AMP PROBE: CPT | Performed by: FAMILY MEDICINE

## 2024-01-10 PROCEDURE — 99214 OFFICE O/P EST MOD 30 MIN: CPT | Performed by: FAMILY MEDICINE

## 2024-01-10 RX ORDER — HYDROCORTISONE 25 MG/G
CREAM TOPICAL 2 TIMES DAILY PRN
Qty: 30 G | Refills: 1 | Status: SHIPPED | OUTPATIENT
Start: 2024-01-10 | End: 2024-06-21

## 2024-01-10 RX ORDER — DOCUSATE SODIUM 100 MG/1
100 CAPSULE, LIQUID FILLED ORAL 2 TIMES DAILY PRN
Qty: 60 CAPSULE | Refills: 3 | Status: SHIPPED | OUTPATIENT
Start: 2024-01-10 | End: 2024-06-21

## 2024-01-10 NOTE — PROGRESS NOTES
Gisela was seen today for rectal problem.    Diagnoses and all orders for this visit:    External hemorrhoids: No thrombosis on exam today. Reviewed continued constipation management- this has improved within the last week. Use tucks, sitz baths, hydrocortisone prn for symptomatic treatment- she does plan another pregnancy in future but wants to manage her hemorrhoids prior to another pregnancy.   -     hydrocortisone, Perianal, (HYDROCORTISONE) 2.5 % cream; Place rectally 2 times daily as needed for hemorrhoids  -     witch hazel-glycerin (TUCKS) pad; Apply topically as needed for hemorrhoids  -     docusate sodium (COLACE) 100 MG capsule; Take 1 capsule (100 mg) by mouth 2 times daily as needed for constipation    Screening for STDs (sexually transmitted diseases): Asymptomatic screening per recommendations.   -     Chlamydia trachomatis/Neisseria gonorrhoeae by PCR; Future  -     Chlamydia trachomatis/Neisseria gonorrhoeae by PCR          Subjective   Gisela is a 23 year old, presenting for the following health issues:  Rectal Problem      1/10/2024     8:45 AM   Additional Questions   Roomed by luisa villagran   Accompanied by Son       History of Present Illness       Reason for visit:  Hemorroids  Symptom onset:  More than a month  Symptoms include:  Bleeding, discomfort, pain  Symptom intensity:  Severe  Symptom progression:  Staying the same  Had these symptoms before:  No    She eats 2-3 servings of fruits and vegetables daily.She consumes 2 sweetened beverage(s) daily.She exercises with enough effort to increase her heart rate 10 to 19 minutes per day.  She exercises with enough effort to increase her heart rate 3 or less days per week.   She is taking medications regularly.     Has been using miralax the last week and her constipation has improved  Using preparation H OTC  Sitz baths      Review of Systems         Objective    /64 (BP Location: Right arm, Patient Position: Sitting, Cuff Size: Adult Regular)   " Pulse 59   Temp 98.7  F (37.1  C) (Oral)   Resp 20   Ht 1.53 m (5' 0.24\")   Wt 58 kg (127 lb 12.8 oz)   LMP 10/26/2023 (Approximate)   SpO2 97%   Breastfeeding No   BMI 24.76 kg/m    Body mass index is 24.76 kg/m .  Physical Exam   Gen: well appearing  : 2 external hemorrhoids, tender to palpation on exam.               "

## 2024-01-11 LAB
C TRACH DNA SPEC QL PROBE+SIG AMP: NEGATIVE
N GONORRHOEA DNA SPEC QL NAA+PROBE: NEGATIVE

## 2024-01-17 ENCOUNTER — MYC MEDICAL ADVICE (OUTPATIENT)
Dept: FAMILY MEDICINE | Facility: CLINIC | Age: 24
End: 2024-01-17
Payer: COMMERCIAL

## 2024-01-17 DIAGNOSIS — K64.4 EXTERNAL HEMORRHOIDS: Primary | ICD-10-CM

## 2024-01-17 NOTE — TELEPHONE ENCOUNTER
Chart reviewed. Please review findings below.     External hemorrhoids: No thrombosis on exam today. Reviewed continued constipation management- this has improved within the last week. Use tucks, sitz baths, hydrocortisone prn for symptomatic treatment- she does plan another pregnancy in future but wants to manage her hemorrhoids prior to another pregnancy.   -     hydrocortisone, Perianal, (HYDROCORTISONE) 2.5 % cream; Place rectally 2 times daily as needed for hemorrhoids  -     witch hazel-glycerin (TUCKS) pad; Apply topically as needed for hemorrhoids  -     docusate sodium (COLACE) 100 MG capsule; Take 1 capsule (100 mg) by mouth 2 times daily as needed for constipation

## 2024-01-19 NOTE — TELEPHONE ENCOUNTER
Diagnosis, Referred by & from: External Hemorrhoids   Appt date: 2/5/2024   NOTES STATUS DETAILS   OFFICE NOTE from referring provider Internal [facility] [place]:  1/10/24, 8/31/[facility]yer   [facility]  1/13/22 - OBGYN OV with Dr. Eastman   DISCHARGE SUMMARY from hospital N/A    DISCHARGE REPORT from the ER N/A    OPERATIVE REPORT N/A    MEDICATION LIST Internal    LABS N/A    DIAGNOSTIC PROCEDURES N/A    IMAGING (DISC & REPORT) N/A

## 2024-01-21 ENCOUNTER — HOSPITAL ENCOUNTER (EMERGENCY)
Facility: HOSPITAL | Age: 24
Discharge: HOME | End: 2024-01-21
Attending: EMERGENCY MEDICINE | Admitting: EMERGENCY MEDICINE
Payer: COMMERCIAL

## 2024-01-21 VITALS
SYSTOLIC BLOOD PRESSURE: 120 MMHG | RESPIRATION RATE: 16 BRPM | WEIGHT: 122.6 LBS | HEART RATE: 75 BPM | DIASTOLIC BLOOD PRESSURE: 79 MMHG | HEIGHT: 60 IN | BODY MASS INDEX: 24.07 KG/M2 | TEMPERATURE: 97.8 F | OXYGEN SATURATION: 99 %

## 2024-01-21 DIAGNOSIS — K64.4 EXTERNAL HEMORRHOIDS: ICD-10-CM

## 2024-01-21 DIAGNOSIS — K60.2 ANAL FISSURE: ICD-10-CM

## 2024-01-21 PROCEDURE — 99283 EMERGENCY DEPT VISIT LOW MDM: CPT

## 2024-01-21 RX ORDER — LIDOCAINE 50 MG/G
OINTMENT TOPICAL EVERY 8 HOURS PRN
Qty: 30 G | Refills: 0 | Status: SHIPPED | OUTPATIENT
Start: 2024-01-21 | End: 2024-06-21

## 2024-01-21 RX ORDER — ASPIRIN 81 MG
100 TABLET, DELAYED RELEASE (ENTERIC COATED) ORAL DAILY
Qty: 21 TABLET | Refills: 0 | Status: SHIPPED | OUTPATIENT
Start: 2024-01-21 | End: 2024-02-11

## 2024-01-21 NOTE — ED PROVIDER NOTES
Emergency Department Encounter     Evaluation Date & Time:   2024  1:08 PM    CHIEF COMPLAINT:  Rectal/perineal Pain      Triage Note:Patient presents here for evaluation of rectal pain that she attributes to flare up of hemorrhoids. She reports pain with defecation and noting blood after passing a stool. She has been attempting sitz baths and several OTC medications/remedies to treat with little relief of discomfort.        Impression and Plan       FINAL IMPRESSION:    ICD-10-CM    1. Anal fissure  K60.2 Adult Colorectal Surgery  Referral      2. External hemorrhoids  K64.4 Adult Colorectal Surgery  Referral          ED COURSE & MEDICAL DECISION MAKIN:31 PM I met the patient and performed my initial interview and exam. We discussed plan for discharge and all questions were answered.     23 year old female, history of PCOS and hemorrhoids, who presents for evaluation of rectal pain.    She had a vaginal delivery in October and developed hemorrhoids in November (2 months ago). In December, she developed intermittent rectal pain that has been worsening over the past month. The pain worsens with having bowel movements. She reports some constipation and straining when passing hard stools. She also has had some rectal bleeding with bowel movements; sometimes there are a few drops of blood into the toilet, but typically just notes some blood on the toilet paper with wiping. No abdominal pain, nausea / vomiting, fevers.     She saw her OBGYN who prescribed a steroid cream. She also has been doing sitz baths, using Tucks pads and Preparation H without relief.     On exam, there are 3 small hemorrhoids to the left of the anal verge that do not appear thrombosed, however are tender to palpation; no fluctuance to suggest abscess. No visible fissure, however patient unable to tolerate NAOMI secondary to pain.     I suspect that her pain is likely secondary to anal fissure.     Patient discharged to  home with follow-up with the Colorectal Surgery Clinic; a referral was ordered. I advised that she continue to do the sitz baths and the steroid cream. I also prescribed a lidocaine ointment and colace. I also recommended OTC ibuprofen. Return precautions provided. Patient stable throughout ED course.       At the conclusion of the encounter I discussed the results of all the tests and the disposition. The questions were answered. The patient and family acknowledged understanding and were agreeable with the care plan.    Medical Decision Making    History:  Obtained supplemental history:Supplemental history obtained?: No  Reviewed external records: External records reviewed?: No    Complicating factors:  Care impacted by chronic illness:N/A  Care significantly affected by social determinants of health:N/A    Disposition considerations: Discharge. I prescribed additional prescription strength medication(s) as charted. N/A.    MEDICATIONS GIVEN IN THE EMERGENCY DEPARTMENT:  Medications - No data to display    NEW PRESCRIPTIONS STARTED AT TODAY'S ED VISIT:  Discharge Medication List as of 1/21/2024  1:49 PM        START taking these medications    Details   docusate sodium (COLACE) 100 MG tablet Take 1 tablet (100 mg) by mouth daily for 21 days, Disp-21 tablet, R-0, Local Print      lidocaine (XYLOCAINE) 5 % external ointment Apply topically every 8 hours as needed for moderate painDisp-30 g, R-0Local Print             HPI     HPI     Gisela Moon is a 23 year old female, history of PCOS and hemorrhoids, who presents to this ED via walk-in for evaluation of rectal pain.    The patient had a vaginal delivery in October. She then developed hemorrhoids in November (2 months ago). She reports associated intermittent rectal pain that has worsened over the past month. The pain is present when she is not having a bowel movement, but the pain worsens with bowel movements. She reports pain with sitting and her daily  activities. She also reports some associated bleeding that occurs only with bowel movements. She reports sometimes a few drops of blood into the toilet, but typically just notes some blood on the toilet paper with wiping. She does endorse constipation and sometimes strains to pass hard stools.  She denies associated abdominal pain, nausea / vomiting, fevers.     She saw her OBGYN who prescribed a steroid cream. She also has been doing sitz baths, using Tucks pads and Preparation H without relief.     She has otherwise been in her usual state of health and denies chest pain, shortness of breath, diarrhea, cough or other concerns.    REVIEW OF SYSTEMS:  All other systems reviewed and are negative.      Medical History     Past Medical History:   Diagnosis Date    Infertility, female     Polycystic ovary syndrome        History reviewed. No pertinent surgical history.    Family History   Problem Relation Age of Onset    No Known Problems Mother     Heart Disease Father     Liver Disease Father     No Known Problems Brother     Polycystic ovary syndrome Sister        Social History     Tobacco Use    Smoking status: Never     Passive exposure: Never    Smokeless tobacco: Never   Vaping Use    Vaping Use: Never used   Substance Use Topics    Alcohol use: Never    Drug use: Never       docusate sodium (COLACE) 100 MG tablet  lidocaine (XYLOCAINE) 5 % external ointment  docusate sodium (COLACE) 100 MG capsule  hydrocortisone, Perianal, (HYDROCORTISONE) 2.5 % cream  Prenatal Vit-Fe Fumarate-FA (PRENATAL PLUS) 27-1 MG TABS  witch hazel-glycerin (TUCKS) pad        Physical Exam     First Vitals:  Patient Vitals for the past 24 hrs:   BP Temp Temp src Pulse Resp SpO2 Height Weight   01/21/24 1256 120/79 97.8  F (36.6  C) Oral 75 16 99 % 1.524 m (5') 55.6 kg (122 lb 9.6 oz)       PHYSICAL EXAM:   Physical Exam    GENERAL: Awake, alert.  In no acute distress.   HEENT: Normocephalic, atraumatic.   NECK: No stridor.  PULMONARY:  Symmetrical breath sounds without distress.  Lungs clear to auscultation bilaterally without wheezes, rhonchi or rales.  CARDIO: Regular rate and rhythm.  No significant murmur, rub or gallop.    ABDOMINAL: Abdomen soft, non-distended and non-tender to palpation.    RECTAL:  There are 3 small hemorrhoids to the left of the anal verge that do not appear thrombosed, however are tender to palpation; no fluctuance to suggest abscess. No visible fissure, however patient unable to tolerate NAOMI secondary to pain.   EXTREMITIES: No lower extremity swelling or edema.      NEURO: Alert and oriented to person, place and time.  Cranial nerves grossly intact.  No focal motor deficit.  PSYCH: Normal mood and affect.        I, Gee Gamboa, am serving as a scribe to document services personally performed by Sharonda Walker MD based on my observation and the provider's statements to me. I, Sharonda Walker MD attest that Gee Gamboa is acting in a scribe capacity, has observed my performance of the services and has documented them in accordance with my direction.    Sharonda Walker MD  Emergency Medicine  [facility] EMERGENCY DEPARTMENT           Sharonda Walker MD  01/21/24 2979

## 2024-01-21 NOTE — DISCHARGE INSTRUCTIONS
Please follow-up with the Colorectal Clinic within 1-2 weeks; they should call you to arrange appointment.    Return to the ER for worsening symptoms / worsening pain, increased bleeding, if you are unable to pass urine or stool, fever or other concerns.    Take the stool softener daily (ideally want soft stools, but not diarrhea) and increase your fiber intake.    Continue to use the steroid cream and try the lidocaine cream as well for pain. Continue sitz baths.

## 2024-01-21 NOTE — ED TRIAGE NOTES
Patient presents here for evaluation of rectal pain that she attributes to flare up of hemorrhoids. She reports pain with defecation and noting blood after passing a stool. She has been attempting sitz baths and several OTC medications/remedies to treat with little relief of discomfort.

## 2024-01-21 NOTE — ED NOTES
Pt alert and oriented x4. Ambulated with a steady gait. Discharged to home with significant other. Pt given printed rx, instructed to follow up with colorectal sx. Pt verbalized understanding.

## 2024-02-05 ENCOUNTER — OFFICE VISIT (OUTPATIENT)
Dept: SURGERY | Facility: CLINIC | Age: 24
End: 2024-02-05
Attending: FAMILY MEDICINE
Payer: COMMERCIAL

## 2024-02-05 ENCOUNTER — PRE VISIT (OUTPATIENT)
Dept: SURGERY | Facility: CLINIC | Age: 24
End: 2024-02-05

## 2024-02-05 VITALS — SYSTOLIC BLOOD PRESSURE: 111 MMHG | DIASTOLIC BLOOD PRESSURE: 70 MMHG | HEART RATE: 75 BPM | OXYGEN SATURATION: 98 %

## 2024-02-05 DIAGNOSIS — K60.2 ANAL FISSURE: Primary | ICD-10-CM

## 2024-02-05 DIAGNOSIS — K64.4 EXTERNAL HEMORRHOIDS: ICD-10-CM

## 2024-02-05 PROCEDURE — 99203 OFFICE O/P NEW LOW 30 MIN: CPT | Performed by: NURSE PRACTITIONER

## 2024-02-05 ASSESSMENT — PAIN SCALES - GENERAL: PAINLEVEL: NO PAIN (0)

## 2024-02-05 NOTE — LETTER
2024       RE: Gisela Moon  [address] [place] [address]       Dear Colleague,    Thank you for referring your patient, Gisela Moon, to the [facility] [place] at [facility]. Please see a copy of my visit note below.    Colon and Rectal Surgery Consult Clinic Note    Date: 2024     Referring provider:  Cassie Iyer MD  [address] [place] [id number]     RE: Gisela Moon  : 2000  IMER: 2024    Gisela Moon is a very pleasant 23 year old female here for hemorrhoids.    HPI:  Has been having pain with bowel movements that started in November. Is just starting to feel better now. Was 10/10 and is now 3/10. Has a hemorrhoid that needs to be manually reduced. Did have bleeding as recently as last week and can drip in the toilet. Is taking Miralax and stools are now soft but they were hard before. Was in the ER and told she likely had an anal fissure and was started on lidocaine two weeks ago.   No family history of IBD or colon cancer.  Has had one vaginal delivery. No tearing or episiotomies. No difficulty holding bowel movements.    Physical Examination:  /70 (BP Location: Left arm, Patient Position: Sitting, Cuff Size: Adult Regular)   Pulse 75   LMP 2024 (Approximate)   SpO2 98%   General: alert, oriented, in no acute distress, sitting comfortably  HEENT: mucous membranes moist    Perianal external examination: Exam was chaperoned by Rom Blake, EMT-P   Perianal skin: Intact with no excoriation or lichenification.  Lesions: No evidence of an external lesion, nodularity, or induration in the perianal region.  Eversion of buttocks: There was evidence of an anal fissure. Details: anterior midline anal fissure.  Skin tags or external hemorrhoids: Yes: small skin tags in the anterior midline with some edema.    Digital rectal examination: Was deferred in order not  to cause further trauma    Anoscopy: Was deferred in order not to cause further trauma      Assessment/Plan: 23 year old female with anal fissure. I discussed that this is likely the source of the pain and bleeding. Discussed the importance of keeping stools soft and easy to pass while healing fissure.  Recommended starting on a daily fiber supplement and can add in a laxative in addition as needed.  Will treat with topical diltiazem with follow up in 8 weeks. Discussed that it may take several weeks for symptoms to improve. If no improvement is noted after 4 weeks, return to clinic and discuss further intervention such as Botox injections.  Advised the use of Tylenol, ibuprofen, and warm tub baths for any pain. Patient's questions were answered to her stated satisfaction and she is in agreement with this plan.     Medical history:  Past Medical History:   Diagnosis Date    Infertility, female     Polycystic ovary syndrome        Surgical history:  No past surgical history on file.    Problem list:    Patient Active Problem List    Diagnosis Date Noted    History of  delivery 10/14/2022     Priority: Medium     Shortened cervix       History of  premature rupture of membranes (PPROM) 10/14/2022     Priority: Medium    PCOS (polycystic ovarian syndrome) 2020     Priority: Medium       Medications:  Current Outpatient Medications   Medication Sig Dispense Refill    docusate sodium (COLACE) 100 MG capsule Take 1 capsule (100 mg) by mouth 2 times daily as needed for constipation 60 capsule 3    docusate sodium (COLACE) 100 MG tablet Take 1 tablet (100 mg) by mouth daily for 21 days 21 tablet 0    hydrocortisone, Perianal, (HYDROCORTISONE) 2.5 % cream Place rectally 2 times daily as needed for hemorrhoids 30 g 1    lidocaine (XYLOCAINE) 5 % external ointment Apply topically every 8 hours as needed for moderate pain 30 g 0    Prenatal Vit-Fe Fumarate-FA (PRENATAL PLUS) 27-1 MG TABS Take 1 tablet by  mouth      witch hazel-glycerin (TUCKS) pad Apply topically as needed for hemorrhoids 100 each 2       Allergies:  No Known Allergies    Family history:  Family History   Problem Relation Age of Onset    No Known Problems Mother     Heart Disease Father     Liver Disease Father     No Known Problems Brother     Polycystic ovary syndrome Sister        Social history:  Social History     Tobacco Use    Smoking status: Never     Passive exposure: Never    Smokeless tobacco: Never   Substance Use Topics    Alcohol use: Never    Marital status: single.  Occupation: stay at home mom.    Nursing Notes:   Rom Blake, EMT  2/5/2024  2:58 PM  Signed  Chief Complaint   Patient presents with    Consult       Vitals:    02/05/24 1456   BP: 111/70   BP Location: Left arm   Patient Position: Sitting   Cuff Size: Adult Regular   Pulse: 75   SpO2: 98%       There is no height or weight on file to calculate BMI.    Rom Blake EMT-P       15 minutes spent on the date of encounter performing chart review, history and exam, documentation and further activities as noted above       This note was created using speech recognition software and may contain unintended word substitutions.        Again, thank you for allowing me to participate in the care of your patient.      Sincerely,    FILIPPO Fontana CNP

## 2024-02-05 NOTE — PROGRESS NOTES
Colon and Rectal Surgery Consult Clinic Note    Date: 2024     Referring provider:  Cassie Iyer MD  [address] [place] [id number]     RE: Gisela Moon  : 2000  IMER: 2024    Gisela Moon is a very pleasant 23 year old female here for hemorrhoids.    HPI:  Has been having pain with bowel movements that started in November. Is just starting to feel better now. Was 10/10 and is now 3/10. Has a hemorrhoid that needs to be manually reduced. Did have bleeding as recently as last week and can drip in the toilet. Is taking Miralax and stools are now soft but they were hard before. Was in the ER and told she likely had an anal fissure and was started on lidocaine two weeks ago.   No family history of IBD or colon cancer.  Has had one vaginal delivery. No tearing or episiotomies. No difficulty holding bowel movements.    Physical Examination:  /70 (BP Location: Left arm, Patient Position: Sitting, Cuff Size: Adult Regular)   Pulse 75   LMP 2024 (Approximate)   SpO2 98%   General: alert, oriented, in no acute distress, sitting comfortably  HEENT: mucous membranes moist    Perianal external examination: Exam was chaperoned by DALI Simon-P   Perianal skin: Intact with no excoriation or lichenification.  Lesions: No evidence of an external lesion, nodularity, or induration in the perianal region.  Eversion of buttocks: There was evidence of an anal fissure. Details: anterior midline anal fissure.  Skin tags or external hemorrhoids: Yes: small skin tags in the anterior midline with some edema.    Digital rectal examination: Was deferred in order not to cause further trauma    Anoscopy: Was deferred in order not to cause further trauma      Assessment/Plan: 23 year old female with anal fissure. I discussed that this is likely the source of the pain and bleeding. Discussed the importance of keeping stools soft and easy to pass while healing fissure.  Recommended starting  on a daily fiber supplement and can add in a laxative in addition as needed.  Will treat with topical diltiazem with follow up in 8 weeks. Discussed that it may take several weeks for symptoms to improve. If no improvement is noted after 4 weeks, return to clinic and discuss further intervention such as Botox injections.  Advised the use of Tylenol, ibuprofen, and warm tub baths for any pain. Patient's questions were answered to her stated satisfaction and she is in agreement with this plan.     Medical history:  Past Medical History:   Diagnosis Date    Infertility, female     Polycystic ovary syndrome        Surgical history:  No past surgical history on file.    Problem list:    Patient Active Problem List    Diagnosis Date Noted    History of  delivery 10/14/2022     Priority: Medium     Shortened cervix       History of  premature rupture of membranes (PPROM) 10/14/2022     Priority: Medium    PCOS (polycystic ovarian syndrome) 2020     Priority: Medium       Medications:  Current Outpatient Medications   Medication Sig Dispense Refill    docusate sodium (COLACE) 100 MG capsule Take 1 capsule (100 mg) by mouth 2 times daily as needed for constipation 60 capsule 3    docusate sodium (COLACE) 100 MG tablet Take 1 tablet (100 mg) by mouth daily for 21 days 21 tablet 0    hydrocortisone, Perianal, (HYDROCORTISONE) 2.5 % cream Place rectally 2 times daily as needed for hemorrhoids 30 g 1    lidocaine (XYLOCAINE) 5 % external ointment Apply topically every 8 hours as needed for moderate pain 30 g 0    Prenatal Vit-Fe Fumarate-FA (PRENATAL PLUS) 27-1 MG TABS Take 1 tablet by mouth      witch hazel-glycerin (TUCKS) pad Apply topically as needed for hemorrhoids 100 each 2       Allergies:  No Known Allergies    Family history:  Family History   Problem Relation Age of Onset    No Known Problems Mother     Heart Disease Father     Liver Disease Father     No Known Problems Brother     Polycystic ovary  syndrome Sister        Social history:  Social History     Tobacco Use    Smoking status: Never     Passive exposure: Never    Smokeless tobacco: Never   Substance Use Topics    Alcohol use: Never    Marital status: single.  Occupation: stay at home mom.    Nursing Notes:   Rom Blake, EMT  2/5/2024  2:58 PM  Signed  Chief Complaint   Patient presents with    Consult       Vitals:    02/05/24 1456   BP: 111/70   BP Location: Left arm   Patient Position: Sitting   Cuff Size: Adult Regular   Pulse: 75   SpO2: 98%       There is no height or weight on file to calculate BMI.    Rom Blake EMT-P       15 minutes spent on the date of encounter performing chart review, history and exam, documentation and further activities as noted above     FILIPPO Olivia, NP-C  Colon and Rectal Surgery   [facility]    This note was created using speech recognition software and may contain unintended word substitutions.

## 2024-02-05 NOTE — NURSING NOTE
Chief Complaint   Patient presents with    Consult       Vitals:    02/05/24 1456   BP: 111/70   BP Location: Left arm   Patient Position: Sitting   Cuff Size: Adult Regular   Pulse: 75   SpO2: 98%       There is no height or weight on file to calculate BMI.    Rom Blake EMT-P

## 2024-02-07 ENCOUNTER — MYC MEDICAL ADVICE (OUTPATIENT)
Dept: FAMILY MEDICINE | Facility: CLINIC | Age: 24
End: 2024-02-07
Payer: COMMERCIAL

## 2024-02-08 NOTE — TELEPHONE ENCOUNTER
Spoke to pt regarding provider's message below.    Patient verbalizes understanding, agrees with plan and has no further questions.    Closing encounter.    CLARIBEL NolascoN, RN   [facility]

## 2024-02-08 NOTE — TELEPHONE ENCOUNTER
Writer spoke to patient regarding MyChart message. Per patient, she would like to ask Dr. Iyer if she can take Miralax in the morning and Docusate at night?    Patient has been taking Miralax in the morning and Docusate every other night. So far, she has be having a BM every morning.    Patient's usual BM schedule is going every 2-3 days.    (Writer send separate TE regarding patient's child.)    Will route patient message to Dr. Iyer.    CLARIBEL NolascoN, RN   [facility]

## 2024-03-05 DIAGNOSIS — F32.A DEPRESSION, UNSPECIFIED DEPRESSION TYPE: Primary | ICD-10-CM

## 2024-05-01 ENCOUNTER — MYC MEDICAL ADVICE (OUTPATIENT)
Dept: FAMILY MEDICINE | Facility: CLINIC | Age: 24
End: 2024-05-01
Payer: COMMERCIAL

## 2024-05-03 NOTE — TELEPHONE ENCOUNTER
Writer replied to patient via [employer] message.    CLARIBEL NolascoN, RN   [facility]

## 2024-05-09 ENCOUNTER — MYC MEDICAL ADVICE (OUTPATIENT)
Dept: FAMILY MEDICINE | Facility: CLINIC | Age: 24
End: 2024-05-09

## 2024-05-09 ENCOUNTER — OFFICE VISIT (OUTPATIENT)
Dept: FAMILY MEDICINE | Facility: CLINIC | Age: 24
End: 2024-05-09
Payer: COMMERCIAL

## 2024-05-09 VITALS
RESPIRATION RATE: 16 BRPM | HEART RATE: 67 BPM | TEMPERATURE: 98.4 F | BODY MASS INDEX: 22.26 KG/M2 | SYSTOLIC BLOOD PRESSURE: 109 MMHG | DIASTOLIC BLOOD PRESSURE: 72 MMHG | OXYGEN SATURATION: 100 % | WEIGHT: 114 LBS

## 2024-05-09 DIAGNOSIS — K60.2 ANAL FISSURE: Primary | ICD-10-CM

## 2024-05-09 DIAGNOSIS — R21 RASH: ICD-10-CM

## 2024-05-09 PROCEDURE — 99213 OFFICE O/P EST LOW 20 MIN: CPT | Performed by: NURSE PRACTITIONER

## 2024-05-09 ASSESSMENT — ENCOUNTER SYMPTOMS: ABDOMINAL PAIN: 0

## 2024-05-09 NOTE — PATIENT INSTRUCTIONS
Recommend avoidance of the body wash you were using when the rash started.  I do not see a rash currently.    If the rash comes back, take note of which medications you applied to the area just before it started.    Call your colon and rectal surgery clinic for follow-up as soon as they can get you in for the rectal pain.    Make sure you are taking your Colace (docusate) twice a day to help with straining.    You may also try sitz bath's when you are having rectal pain.  This is simply sitting in a warm bathtub for 10 to 15 minutes to help with discomfort.  You can do this 2 or 3 times a day if needed.

## 2024-05-09 NOTE — PROGRESS NOTES
"Assessment & Plan     Anal fissure      Rash  -Isolated bilateral buttock rash resolved, started after body wash.  Avoid the body wash she was using called \"Native.\"     Having internal distal rectal area discomfort following bowel movements.  Currently 4 out of 10.  Has a known diagnosis of anal fissure.  Small amount of BRB after bowel movement this morning.  No abdominal pain.  No active bleeding seen.    Continue current treatment for anal fissure.  Call colorectal surgery clinic for follow-up.  Colace, sitz bath, topical treatments if helpful for itching.    No visible bleeding or hemorrhoids.          Return in about 1 week (around 5/16/2024).    Magda Granda, [facility] FATOU Romero Mai is a 24 year old female who presents to clinic today for the following health issues:  Chief Complaint   Patient presents with    Derm Problem     X 2 weeks, itchiness, has anal fissure and was affected area but spread to gluteal area.      HPI    Pain in rectal area after having BM.    Was dx with fissure.     Has seen colon and rectal surgery in Feb for fissue.  Was told it may take several weeks to fully resolve.    Mild BRB after having a BM this morning.  Rectal area pain inside 4 out of 10 currently.    Taking stool softener, diltiazem topically.  Stopped diltiazem after itching 2 weeks ago.  Had been using off and on. Now  using calmoseptine and Pranicura.  With stool softener.    Had a rash on bilateral butt talk starting couple of weeks ago that started associated with using a new body wash.  Did not have a rash elsewhere.    There is bruising within this area that she says was from scratching.    Symptoms have subsided.    Patient showed me a photo from her phone with large area of confluent erythematous rash located on bilateral buttock.  With limitation of phone photo, did have a somewhat rough appearance to it.      Review of Systems   Gastrointestinal:  Negative for abdominal " pain.       See HPI        Objective    /72 (BP Location: Right arm, Patient Position: Sitting, Cuff Size: Adult Small)   Pulse 67   Temp 98.4  F (36.9  C) (Oral)   Resp 16   Wt 51.7 kg (114 lb)   LMP 04/23/2024   SpO2 100%   BMI 22.26 kg/m    Physical Exam  Constitutional:       General: She is not in acute distress.     Appearance: She is well-developed.   Eyes:      General:         Right eye: No discharge.         Left eye: No discharge.      Conjunctiva/sclera: Conjunctivae normal.   Pulmonary:      Effort: Pulmonary effort is normal.   Genitourinary:     Comments: Mild perirectal tenderness.  No hemorrhoids.  No active bleeding seen.  Musculoskeletal:         General: Normal range of motion.   Skin:     General: Skin is warm and dry.      Capillary Refill: Capillary refill takes less than 2 seconds.      Findings: Bruising (Tiny areas of bruising on left buttock.) present. No rash.   Neurological:      Mental Status: She is alert and oriented to person, place, and time.   Psychiatric:         Mood and Affect: Mood normal.         Behavior: Behavior normal.         Thought Content: Thought content normal.         Judgment: Judgment normal.

## 2024-05-10 ENCOUNTER — NURSE TRIAGE (OUTPATIENT)
Dept: FAMILY MEDICINE | Facility: CLINIC | Age: 24
End: 2024-05-10
Payer: COMMERCIAL

## 2024-05-10 NOTE — TELEPHONE ENCOUNTER
See Nurse triage encounter for more information.    CLARIBEL NolascoN, RN   [facility]

## 2024-05-10 NOTE — TELEPHONE ENCOUNTER
"Nurse Triage SBAR    Is this a 2nd Level Triage? NO    Situation:   Rectal itchiness symptoms started about 2 weeks ago.    Background:  First time patient had symptoms.    Assessment:  Patient has anal fissures, and sees a colon rectal doctor for it. Per patient, there is itchiness (would scratch area), pain from the anal fissure. No swelling. No rash in the rectum area. Rectal anal fissure pain rated at 1/10. More uncomfortable versus having pain. Rectal itchiness is rated at 9/10. Patient's colon rectal doctor recommended patient to use calmoseptine cream, that was bought OTC. Cream did NOT help with itchiness. Patient is NOT constipated. Patient does have some abodminal pain (comes and goes). No fever, no rectal bleeding, and no vomiting. Last BM was yesterday morning. No concerns of STDs.    No chance of pregnancy.    Protocol Recommended Disposition:   See in Office Today or Tomorrow    Recommendation:   Care Advice given to patient.  Patient unable to be seen today. There are no available appointments at [facility] today. Patient says she can go to the Urgent care either today or tomorrow.    Before writer deferred patient to be seen in Urgent Care, patient shared with writer that she actually patient to be seen in Urgent Care yesterday. Further chart review, patient was just seen in Urgent Care at [facility] yesterday 5/09/2024. Per provider's note below:    \"Continue current treatment for anal fissure.  Call colorectal surgery clinic for follow-up.  Colace, sitz bath, topical treatments if helpful for itching.     No visible bleeding or hemorrhoids.\"    Patient was told to follow-up with PCP regarding the rash. Patient wants to be seen. Assisted in scheduling patient for:    May 13, 2024 10:20 AM  (Arrive by 10:00 AM)  Provider Visit with Ximena Ruiz MD  [facility] ([facility] ) [phone]     Patient verbalizes understanding, agrees with plan and " has no further questions.    Will NOT route to provider as patient already has an appointment scheduled.    Does the patient meet one of the following criteria for ADS visit consideration? No    HEATHER Nolasco, RN   [facility]    Reason for Disposition   Patient wants to be seen    Additional Information   Negative: Sounds like a life-threatening emergency to the triager   Negative: Diarrhea is main symptom   Negative: Constipation is main symptom (e.g., pain or discomfort caused by passage of hard BMs)   Negative: Blood in or on bowel movement is main symptom   Negative: MPOX SUSPECTED (e.g., direct skin contact such as sex, recent travel to [place] or [place]) and any SYMPTOMS OF MPOX (e.g., rash, fever, muscle aches, or swollen lymph nodes)   Negative: At risk for Mpox (men-who-have-sex-with-men) and possible exposure (e.g., multiple sex partners in past 21 days) and ANY SYMPTOMS OF MPOX (e.g., rash, fever, muscle aches, or swollen lymph nodes)   Negative: Sexual assault or rape (sexual intercourse or activity occurs without freely given consent), known or suspected   Negative: Injury to rectum   Negative: Patient sounds very sick or weak to the triager   Negative: Severe rectal pain   Negative: Rectal pain or redness and fever > 100.4 F (38.0 C)   Negative: Acute onset rectal pain and constipation (straining with rectal pressure or fullness), which is not relieved by Sitz bath or suppository   Negative: MODERATE-SEVERE rectal pain (i.e., interferes with school, work, or sleep)   Negative: MODERATE-SEVERE rectal itching (i.e., interferes with school, work, or sleep)   Negative: Last bowel movement (BM) > 4 days ago   Negative: Rectal area looks infected (e.g., draining sore, spreading redness)   Negative: Rash of rectal area (e.g., open sore, painful tiny water blisters, unexplained bumps)   Negative: Patient is worried they have a sexually transmitted infection (STI)    Protocols  used: Rectal Symptoms-A-[place]

## 2024-05-13 ENCOUNTER — OFFICE VISIT (OUTPATIENT)
Dept: FAMILY MEDICINE | Facility: CLINIC | Age: 24
End: 2024-05-13
Payer: COMMERCIAL

## 2024-05-13 VITALS
HEART RATE: 56 BPM | RESPIRATION RATE: 15 BRPM | HEIGHT: 60 IN | BODY MASS INDEX: 22.78 KG/M2 | DIASTOLIC BLOOD PRESSURE: 73 MMHG | OXYGEN SATURATION: 99 % | SYSTOLIC BLOOD PRESSURE: 108 MMHG | WEIGHT: 116 LBS | TEMPERATURE: 98.2 F

## 2024-05-13 DIAGNOSIS — R53.83 OTHER FATIGUE: ICD-10-CM

## 2024-05-13 DIAGNOSIS — Z12.4 CERVICAL CANCER SCREENING: ICD-10-CM

## 2024-05-13 DIAGNOSIS — K64.9 HEMORRHOIDS, UNSPECIFIED HEMORRHOID TYPE: Primary | ICD-10-CM

## 2024-05-13 LAB
BASOPHILS # BLD AUTO: 0 10E3/UL (ref 0–0.2)
BASOPHILS NFR BLD AUTO: 0 %
EOSINOPHIL # BLD AUTO: 0.1 10E3/UL (ref 0–0.7)
EOSINOPHIL NFR BLD AUTO: 1 %
ERYTHROCYTE [DISTWIDTH] IN BLOOD BY AUTOMATED COUNT: 13.4 % (ref 10–15)
HCT VFR BLD AUTO: 37.7 % (ref 35–47)
HGB BLD-MCNC: 12.2 G/DL (ref 11.7–15.7)
IMM GRANULOCYTES # BLD: 0 10E3/UL
IMM GRANULOCYTES NFR BLD: 0 %
LYMPHOCYTES # BLD AUTO: 1.6 10E3/UL (ref 0.8–5.3)
LYMPHOCYTES NFR BLD AUTO: 28 %
MCH RBC QN AUTO: 28.1 PG (ref 26.5–33)
MCHC RBC AUTO-ENTMCNC: 32.4 G/DL (ref 31.5–36.5)
MCV RBC AUTO: 87 FL (ref 78–100)
MONOCYTES # BLD AUTO: 0.3 10E3/UL (ref 0–1.3)
MONOCYTES NFR BLD AUTO: 6 %
NEUTROPHILS # BLD AUTO: 3.6 10E3/UL (ref 1.6–8.3)
NEUTROPHILS NFR BLD AUTO: 64 %
PLATELET # BLD AUTO: 223 10E3/UL (ref 150–450)
RBC # BLD AUTO: 4.34 10E6/UL (ref 3.8–5.2)
TSH SERPL DL<=0.005 MIU/L-ACNC: 1.21 UIU/ML (ref 0.3–4.2)
WBC # BLD AUTO: 5.6 10E3/UL (ref 4–11)

## 2024-05-13 PROCEDURE — 36415 COLL VENOUS BLD VENIPUNCTURE: CPT | Performed by: FAMILY MEDICINE

## 2024-05-13 PROCEDURE — 84443 ASSAY THYROID STIM HORMONE: CPT | Performed by: FAMILY MEDICINE

## 2024-05-13 PROCEDURE — 90480 ADMN SARSCOV2 VAC 1/ONLY CMP: CPT | Performed by: FAMILY MEDICINE

## 2024-05-13 PROCEDURE — G0145 SCR C/V CYTO,THINLAYER,RESCR: HCPCS | Performed by: FAMILY MEDICINE

## 2024-05-13 PROCEDURE — 90651 9VHPV VACCINE 2/3 DOSE IM: CPT | Performed by: FAMILY MEDICINE

## 2024-05-13 PROCEDURE — 85025 COMPLETE CBC W/AUTO DIFF WBC: CPT | Performed by: FAMILY MEDICINE

## 2024-05-13 PROCEDURE — 91320 SARSCV2 VAC 30MCG TRS-SUC IM: CPT | Performed by: FAMILY MEDICINE

## 2024-05-13 PROCEDURE — 90471 IMMUNIZATION ADMIN: CPT | Performed by: FAMILY MEDICINE

## 2024-05-13 PROCEDURE — 99213 OFFICE O/P EST LOW 20 MIN: CPT | Mod: 25 | Performed by: FAMILY MEDICINE

## 2024-05-13 NOTE — PROGRESS NOTES
"  Assessment & Plan     Hemorrhoidal skin tags  Could be cause of itching. Return to colorectal if interested in in treating    Cervical cancer screening  Agrees to pap today (overdue)  - Pap Screen only - recommended age 21 - 24 years    Other fatigue  - CBC with platelets and differential  - TSH with free T4 reflex    Rash, buttocks  Resolved after stopping new body wash                    Subjective   Gisela is a 24 year old, presenting for the following health issues:  Rectal Problem (Rectum and butt cheeks itchiness from 1-2 weeks ago) and Hives      5/13/2024    10:21 AM   Additional Questions   Roomed by riki villagran MA   Accompanied by son     Still having some \"spasm\" at fissure - no longer having \"glass\" shearing pain, feels more like a spasm pain    Itchy rash on buttocks after using new body gel - now rash has resolved, mild itching at fissure. Patient thinks because fissure is healing    Colace as needed, stool is soft, not straining    Willing to do pap    Feeling fatigued for couple days  Checked UPT and negative  LMP 3 weeks ago and normal    PCOS diagnosed 2021  Trying to get pregnant for almost 1 year without success, has appt sooner w Dr. Moon to discuss    History of Present Illness       Reason for visit:  Hives  Symptom onset:  1-2 weeks ago  Symptoms include:  Itchy rectum and butt cheeks  Symptom intensity:  Moderate  Symptom progression:  Improving  Had these symptoms before:  No    She eats 4 or more servings of fruits and vegetables daily.She consumes 1 sweetened beverage(s) daily.She exercises with enough effort to increase her heart rate 10 to 19 minutes per day.  She exercises with enough effort to increase her heart rate 3 or less days per week. She is missing 1 dose(s) of medications per week.  She is not taking prescribed medications regularly due to side effects and remembering to take.                     Objective    /73   Pulse 56   Temp 98.2  F (36.8  C) (Oral)   Resp 15   Ht " 1.524 m (5')   Wt 52.6 kg (116 lb)   LMP 04/23/2024 (Exact Date)   SpO2 99%   BMI 22.65 kg/m    Body mass index is 22.65 kg/m .  Physical Exam   GENERAL: alert and no distress  EYES: Eyes grossly normal to inspection, conjunctivae and sclerae normal   (female): normal female external genitalia, normal urethral meatus, normal vaginal mucosa. ,normal appearing cervix, pap collected  MS: no gross musculoskeletal defects noted, no edema  Anus: hemorrhoidal skin tag, no fissure seen  Skin: buttocks clear, no rash        Prior to immunization administration, verified patients identity using patient s name and date of birth. Please see Immunization Activity for additional information.     Screening Questionnaire for Adult Immunization    Are you sick today?   No   Do you have allergies to medications, food, a vaccine component or latex?   No   Have you ever had a serious reaction after receiving a vaccination?   No   Do you have a long-term health problem with heart, lung, kidney, or metabolic disease (e.g., diabetes), asthma, a blood disorder, no spleen, complement component deficiency, a cochlear implant, or a spinal fluid leak?  Are you on long-term aspirin therapy?   No   Do you have cancer, leukemia, HIV/AIDS, or any other immune system problem?   No   Do you have a parent, brother, or sister with an immune system problem?   No   In the past 3 months, have you taken medications that affect  your immune system, such as prednisone, other steroids, or anticancer drugs; drugs for the treatment of rheumatoid arthritis, Crohn s disease, or psoriasis; or have you had radiation treatments?   No   Have you had a seizure, or a brain or other nervous system problem?   No   During the past year, have you received a transfusion of blood or blood    products, or been given immune (gamma) globulin or antiviral drug?   No   For women: Are you pregnant or is there a chance you could become       pregnant during the next month?    No   Have you received any vaccinations in the past 4 weeks?   No     Immunization questionnaire answers were all negative.      Patient instructed to remain in clinic for 15 minutes afterwards, and to report any adverse reactions.     Screening performed by Rosemary Greene on 5/13/2024 at 11:46 AM.         Signed Electronically by: Ximena Ruiz MD

## 2024-05-15 ENCOUNTER — TELEPHONE (OUTPATIENT)
Dept: SURGERY | Facility: CLINIC | Age: 24
End: 2024-05-15
Payer: COMMERCIAL

## 2024-05-15 LAB
BKR LAB AP GYN ADEQUACY: NORMAL
BKR LAB AP GYN INTERPRETATION: NORMAL
BKR LAB AP HPV REFLEX: NO
BKR LAB AP LMP: NORMAL
BKR LAB AP PREVIOUS ABNORMAL: NORMAL
PATH REPORT.COMMENTS IMP SPEC: NORMAL
PATH REPORT.COMMENTS IMP SPEC: NORMAL
PATH REPORT.RELEVANT HX SPEC: NORMAL

## 2024-05-15 NOTE — TELEPHONE ENCOUNTER
[facility] Call Center    Phone Message    May a detailed message be left on voicemail: yes     Reason for Call: Other: Pt appt not scheduled with 1-2 week time-frame. Call-back.     Action Taken: Message routed to:  [facility] Colon Rectal    Travel Screening: Not Applicable

## 2024-05-17 NOTE — TELEPHONE ENCOUNTER
Diagnosis, Referred by & from: Anal Fissure, External Hemorrhoids   Appt date: 8/6/2024   NOTES STATUS DETAILS   OFFICE NOTE from referring provider Internal [facility] [place]:  5/13/24 - [facility] OV with Dr. Ruiz   OFFICE NOTE from other specialist Care Everywhere / Internal [facility]:  5/9/24 - [facility] OV with Magda Granda NP    [facility]:  2/5/24 - [facility] OV with Nichol Edge NP    [facility]:  6/21/24 - [facility] OV with Dr. Alvarez  1/10/24 - [facility] OV with Dr. Iyer    [facility]:  1/13/22 - OBGYN OV with Dr. Eastman   DISCHARGE SUMMARY from hospital N/A    DISCHARGE REPORT from the ER [facility]:  1/21/24 - ED OV with Dr. Walker   OPERATIVE REPORT N/A    MEDICATION LIST Internal    LABS N/A    DIAGNOSTIC PROCEDURES N/A    IMAGING (DISC & REPORT) N/A

## 2024-05-20 ENCOUNTER — OFFICE VISIT (OUTPATIENT)
Dept: FAMILY MEDICINE | Facility: CLINIC | Age: 24
End: 2024-05-20
Payer: COMMERCIAL

## 2024-05-20 VITALS
HEIGHT: 60 IN | DIASTOLIC BLOOD PRESSURE: 68 MMHG | HEART RATE: 65 BPM | BODY MASS INDEX: 22.99 KG/M2 | SYSTOLIC BLOOD PRESSURE: 102 MMHG | OXYGEN SATURATION: 98 % | WEIGHT: 117.1 LBS | TEMPERATURE: 98.2 F | RESPIRATION RATE: 18 BRPM

## 2024-05-20 DIAGNOSIS — Z91.89: Primary | ICD-10-CM

## 2024-05-20 LAB
FSH SERPL IRP2-ACNC: 5.1 MIU/ML
LH SERPL-ACNC: 19.1 MIU/ML
MIS SERPL-MCNC: 5.28 NG/ML (ref 1.2–12)

## 2024-05-20 PROCEDURE — 99213 OFFICE O/P EST LOW 20 MIN: CPT | Performed by: FAMILY MEDICINE

## 2024-05-20 PROCEDURE — 36415 COLL VENOUS BLD VENIPUNCTURE: CPT | Performed by: FAMILY MEDICINE

## 2024-05-20 PROCEDURE — 82166 ASSAY ANTI-MULLERIAN HORM: CPT | Performed by: FAMILY MEDICINE

## 2024-05-20 PROCEDURE — 83002 ASSAY OF GONADOTROPIN (LH): CPT | Performed by: FAMILY MEDICINE

## 2024-05-20 PROCEDURE — 83001 ASSAY OF GONADOTROPIN (FSH): CPT | Performed by: FAMILY MEDICINE

## 2024-05-20 NOTE — PATIENT INSTRUCTIONS
-Thank you for choosing the [facility].  -It was a pleasure to see you today.  -Please take a look at the information below for more specific details regarding the treatment plan and recommendations.  -In this after visit summary is a list of your medications and specific instructions.  Please review this carefully as there may be changes made to your medication list.  -If there are any particular questions or concerns, please feel free to reach out to Dr. Moon.  -If any labs have been completed, we will reach out to you about results.  If the results are normal or not concerning, a letter or [employer] message will be sent to you.  If any follow-up is needed, either Dr. Moon or the nurse will give you a call.  If you have not heard regarding results after 2 weeks, please reach out to the clinic.    Patient Instructions:    -Do the family-planning as discussed in clinic.  Please see the attached informational sheets regarding family-planning.  -There are many apps available to help you with tracking your menstruation and best timing for you to become pregnant each month.  -At this time, Dr. Moon recommends holding off on fertility related medications (such as Clomid).    -Start taking prenatal vitamins once daily.  -Do not take any medications unless prescribed by your doctor or you are told by a doctor that it is okay to take.  -Avoid any use of alcohol, tobacco/cigarettes, or any recreational drugs as these can cause harm to the unborn child.  -Try to follow a balanced diet with lots of fruits and vegetables.  -Be sure to stay well-hydrated.    Please seek immediate medical attention (go to the emergency room or urgent care) for the following reasons: worsening symptoms, or any concerning changes.      --------------------------------------------------------------------------------------------------------------------    -We are always looking for ways to improve.  You may be selected to receive  a survey regarding your visit today.  We encourage you to complete the survey and provide specific, constructive feedback to help us improve our processes.  Thank you for your time!  -Please review the contact information listed on the after visit summary and in the electronic chart.  Below is the phone number that we have on file.  If there are any changes that are needed to be made, please reach out to the clinic.  [phone] (home)

## 2024-05-20 NOTE — PROGRESS NOTES
OFFICE VISIT    Assessment/Plan:     Patient Instructions:    -Do the family-planning as discussed in clinic.  Please see the attached informational sheets regarding family-planning.  -There are many apps available to help you with tracking your menstruation and best timing for you to become pregnant each month.  -At this time, Dr. Moon recommends holding off on fertility related medications (such as Clomid).    -Start taking prenatal vitamins once daily.  -Do not take any medications unless prescribed by your doctor or you are told by a doctor that it is okay to take.  -Avoid any use of alcohol, tobacco/cigarettes, or any recreational drugs as these can cause harm to the unborn child.  -Try to follow a balanced diet with lots of fruits and vegetables.  -Be sure to stay well-hydrated.    Please seek immediate medical attention (go to the emergency room or urgent care) for the following reasons: worsening symptoms, or any concerning changes.      Gisela was seen today for fertility medication.  Diagnoses and all orders for this visit:    At risk for female infertility: Labs completed below were noted to be in the normal range.  Patient has had 1 previous successful pregnancy and 2 miscarriages.  Patient likely is able to become pregnant.  She was not aware of family-planning, so reviewed pathophysiology and timing for best chances of pregnancy.  Information given to patient to take home.  Patient has been trying to become pregnant for 9 months now and does not feel that care for her infertility yet.  Hold off on Clomid at this time.  -     Luteinizing Hormone; Future  -     Follicle stimulating hormone; Future  -     Anti-Mullerian hormone; Future        Return in 3 months (on 8/20/2024), or if symptoms worsen or fail to improve.    The diagnoses, treatment options, risk, benefits, and recommendations were reviewed with patient/guardian.  Questions were answered to patient's/guardian satisfaction.  Red flag signs were  reviewed.  Patient/guardian is in agreement with above plan.      Subjective: 24 year old female who is a -1-2-1 with history of  delivery,  premature rupture of membranes, PCOS external hemorrhoids, anal fissure who presents to clinic for the following complaints:   Patient presents with:  fertility medication    Answers submitted by the patient for this visit:  General Questionnaire (Submitted on 2024)  Chief Complaint: Chronic problems general questions HPI Form  How many servings of fruits and vegetables do you eat daily?: 4 or more  On average, how many sweetened beverages do you drink each day (Examples: soda, juice, sweet tea, etc.  Do NOT count diet or artificially sweetened beverages)?: 1  How many minutes a day do you exercise enough to make your heart beat faster?: 10 to 19  How many days a week do you exercise enough to make your heart beat faster?: 3 or less  How many days per week do you miss taking your medication?: 1  What makes it hard for you to take your medication every day?: side effects, remembering to take  General Concern (Submitted on 2024)  Chief Complaint: Chronic problems general questions HPI Form  What is the reason for your visit today?: Hives  When did your symptoms begin?: 1-2 weeks ago  What are your symptoms?: Itchy rectum and butt cheeks  How would you describe these symptoms?: Moderate  Are your symptoms:: Improving  Have you had these symptoms before?: No    They want to know about the process and how it works. Sister has been through fertility treatment with clomid.  Reviewed fertility diagnosis and treatment options.  Patient has never seen OB/GYN previously.  Patient's PCP is a family medicine and OB doctor.  The patient's periods are every 30-35 days.  Denies any previous injuries or surgeries.  Patient has had a history of missed  (miscarriage), most recently in 2023.    Normal TSH and CBC noted from 2024.  Reviewed testing  "options/workup for infertility.    The 10 point review of system is negative except as stated in the HPI.    Allergies were reviewed and updated.    Objective:   /68   Pulse 65   Temp 98.2  F (36.8  C) (Oral)   Resp 18   Ht 1.53 m (5' 0.24\")   Wt 53.1 kg (117 lb 1.6 oz)   LMP 2024 (Exact Date)   SpO2 98%   BMI 22.69 kg/m    General: Active, alert, nontoxic-appearing.  No acute distress.  HEENT: Normocephalic, atraumatic.  Pupils are equal and round.  Sclera is clear.  Normal external ears. Nares patent.  Moist mucous membranes.    Cardiac: Normal skin tone.  Respiratory/chest: Speaking full sentences.  Breathing is not labored.  No accessory muscle usage.  Extremities: Voluntary movements intact.  Integumentary: No concerning rash or skin changes appreciated.        Angel Moon MD  [facility] [place] [address]  Phone: [phone]  Fax: [phone]    2024  5:30 PM          Current Outpatient Medications   Medication Sig Dispense Refill    diltiazem 2% in PLO gel Apply small amount to anal opening three times daily for 8 weeks. 60 g 0    docusate sodium (COLACE) 100 MG capsule Take 1 capsule (100 mg) by mouth 2 times daily as needed for constipation 60 capsule 3    Prenatal Vit-Fe Fumarate-FA (PRENATAL PLUS) 27-1 MG TABS Take 1 tablet by mouth      hydrocortisone, Perianal, (HYDROCORTISONE) 2.5 % cream Place rectally 2 times daily as needed for hemorrhoids (Patient not taking: Reported on 2024) 30 g 1    lidocaine (XYLOCAINE) 5 % external ointment Apply topically every 8 hours as needed for moderate pain (Patient not taking: Reported on 2024) 30 g 0    witch hazel-glycerin (TUCKS) pad Apply topically as needed for hemorrhoids (Patient not taking: Reported on 2024) 100 each 2     No current facility-administered medications for this visit.       No Known Allergies    Patient Active Problem List    Diagnosis Date Noted    History of  " delivery 10/14/2022     Priority: Medium     Shortened cervix       History of  premature rupture of membranes (PPROM) 10/14/2022     Priority: Medium    PCOS (polycystic ovarian syndrome) 2020     Priority: Medium       Family History   Problem Relation Age of Onset    No Known Problems Mother     Heart Disease Father     Liver Disease Father     No Known Problems Brother     Polycystic ovary syndrome Sister        No past surgical history on file.     Social History     Socioeconomic History    Marital status: Single     Spouse name: Not on file    Number of children: Not on file    Years of education: Not on file    Highest education level: Not on file   Occupational History    Not on file   Tobacco Use    Smoking status: Never     Passive exposure: Never    Smokeless tobacco: Never   Vaping Use    Vaping status: Never Used   Substance and Sexual Activity    Alcohol use: Never    Drug use: Never    Sexual activity: Yes     Partners: Male   Other Topics Concern    Parent/sibling w/ CABG, MI or angioplasty before 65F 55M? Yes     Comment: my dad.   Social History Narrative    Not on file     Social Determinants of Health     Financial Resource Strain: Low Risk  (2024)    Financial Resource Strain     Within the past 12 months, have you or your family members you live with been unable to get utilities (heat, electricity) when it was really needed?: No   Food Insecurity: Low Risk  (2024)    Food Insecurity     Within the past 12 months, did you worry that your food would run out before you got money to buy more?: No     Within the past 12 months, did the food you bought just not last and you didn t have money to get more?: No   Transportation Needs: Low Risk  (2024)    Transportation Needs     Within the past 12 months, has lack of transportation kept you from medical appointments, getting your medicines, non-medical meetings or appointments, work, or from getting things that you need?: No    Physical Activity: Not on file   Stress: Not on file   Social Connections: Not on file   Interpersonal Safety: Low Risk  (1/10/2024)    Interpersonal Safety     Do you feel physically and emotionally safe where you currently live?: Yes     Within the past 12 months, have you been hit, slapped, kicked or otherwise physically hurt by someone?: No     Within the past 12 months, have you been humiliated or emotionally abused in other ways by your partner or ex-partner?: No   Housing Stability: Low Risk  (1/9/2024)    Housing Stability     Do you have housing? : Yes     Are you worried about losing your housing?: No

## 2024-05-21 PROBLEM — Z91.89: Status: ACTIVE | Noted: 2024-05-21

## 2024-06-03 ENCOUNTER — MYC MEDICAL ADVICE (OUTPATIENT)
Dept: FAMILY MEDICINE | Facility: CLINIC | Age: 24
End: 2024-06-03
Payer: COMMERCIAL

## 2024-06-03 DIAGNOSIS — Z32.01 PREGNANCY TEST POSITIVE: Primary | ICD-10-CM

## 2024-06-03 NOTE — TELEPHONE ENCOUNTER
Spoke to patient on the phone to relay provider recommendation.  Gathered information regarding pregnancy as below.  Future appts with RN OB intake scheduled and IOB with Dr. Iyer.  Dates, times and locations of future appointment scheduled.      LMP: 24   (boy - 20 months old)    Guadalupe Alvarez MD Physician Signed3:17 PM       Please let pt know:     I wouldn't worry too much about that alcohol intake; just stop drinking now that she knows that she is pregnant.  The good news is that major organ development, including the brain, doesn t begin until around the fourth week of pregnancy (around the time a person would miss their first period). If someone drank alcohol before knowing they were pregnant, serious harm is unlikely because critical development hasn t yet occurred.     Please figure out scheduling for this patient (RN intake then first provider visit - can delay first provider visit to closer to 10 weeks).          J Luis Wheatley RN  [facility] [employer] [facility]

## 2024-06-03 NOTE — TELEPHONE ENCOUNTER
Will route encounter to provider to review and advice regarding patient MyChart message sent.  Home pregnancy test positive on 6/2/24; however, patient consumed alcohol on 6/1/24. Patient requesting US.  Has upcoming appointment with Dr. Alvarez 6/12/24. Can issue wait to discuss at upcoming appointment?  Please advice.    Thank you    J Luis Wheatley RN  [facility]

## 2024-06-04 NOTE — TELEPHONE ENCOUNTER
"Pt has further question, routing to covering provider.    \"Hi, I m not sure if I should take any progesterone suppositories as I ve had two miscarriages in the past. i want to ensure this is a viable pregnancy.      Gisela\"        Chauncey Bal Cem Say, BSN RN  [facility]    "

## 2024-06-05 NOTE — TELEPHONE ENCOUNTER
Responded to patient via [employer].  Request a call to schedule a lab only appt for HCG testing. Clinic number provided.        J Luis Wheatley RN  [facility]

## 2024-06-09 SDOH — HEALTH STABILITY: PHYSICAL HEALTH: ON AVERAGE, HOW MANY MINUTES DO YOU ENGAGE IN EXERCISE AT THIS LEVEL?: 30 MIN

## 2024-06-09 SDOH — HEALTH STABILITY: PHYSICAL HEALTH: ON AVERAGE, HOW MANY DAYS PER WEEK DO YOU ENGAGE IN MODERATE TO STRENUOUS EXERCISE (LIKE A BRISK WALK)?: 3 DAYS

## 2024-06-09 ASSESSMENT — LIFESTYLE VARIABLES
HOW MANY STANDARD DRINKS CONTAINING ALCOHOL DO YOU HAVE ON A TYPICAL DAY: PATIENT DOES NOT DRINK
AUDIT-C TOTAL SCORE: 1
HOW OFTEN DO YOU HAVE SIX OR MORE DRINKS ON ONE OCCASION: NEVER
SKIP TO QUESTIONS 9-10: 1
HOW OFTEN DO YOU HAVE A DRINK CONTAINING ALCOHOL: MONTHLY OR LESS

## 2024-06-09 ASSESSMENT — SOCIAL DETERMINANTS OF HEALTH (SDOH)
HOW OFTEN DO YOU ATTENT MEETINGS OF THE CLUB OR ORGANIZATION YOU BELONG TO?: PATIENT DECLINED
IN A TYPICAL WEEK, HOW MANY TIMES DO YOU TALK ON THE PHONE WITH FAMILY, FRIENDS, OR NEIGHBORS?: TWICE A WEEK
DO YOU BELONG TO ANY CLUBS OR ORGANIZATIONS SUCH AS CHURCH GROUPS UNIONS, FRATERNAL OR ATHLETIC GROUPS, OR SCHOOL GROUPS?: NO
HOW OFTEN DO YOU GET TOGETHER WITH FRIENDS OR RELATIVES?: TWICE A WEEK
HOW OFTEN DO YOU ATTEND CHURCH OR RELIGIOUS SERVICES?: 1 TO 4 TIMES PER YEAR

## 2024-06-10 ENCOUNTER — VIRTUAL VISIT (OUTPATIENT)
Dept: FAMILY MEDICINE | Facility: CLINIC | Age: 24
End: 2024-06-10
Payer: COMMERCIAL

## 2024-06-10 DIAGNOSIS — Z11.3 SCREEN FOR STD (SEXUALLY TRANSMITTED DISEASE): ICD-10-CM

## 2024-06-10 DIAGNOSIS — Z87.51 HISTORY OF PRETERM LABOR: ICD-10-CM

## 2024-06-10 DIAGNOSIS — O09.90 SUPERVISION OF HIGH RISK PREGNANCY, ANTEPARTUM: Primary | ICD-10-CM

## 2024-06-10 DIAGNOSIS — Z34.80 PRENATAL CARE, SUBSEQUENT PREGNANCY, UNSPECIFIED TRIMESTER: ICD-10-CM

## 2024-06-10 PROCEDURE — 99207 PR NO CHARGE NURSE ONLY: CPT | Mod: 93

## 2024-06-10 NOTE — COMMUNITY RESOURCES LIST (ENGLISH)
Gav[place] 10, 2024           YOUR PERSONALIZED LIST OF SERVICES & PROGRAMS           & SHELTER    Housing      [facility] - Shelter for families  [address] [place] [address] (Distance: 2.2 miles)  Phone: [phone]  Language: English, Austrian  Fee: Free  Accessibility: Translation services      [facility] Access to Housing and Shelter ([facility])  [address] [place] [address] (Distance: 2.5 miles)  Website: https://[facility].org/find-support/partner-organizations/housing-assistance/  Language: English  Fee: Free  Accessibility: Ada accessible      [facility]  Phone: [phone]  Website: https://www.[employer]uDraft/  Language: English, Hmong, Oromo, Nigerien, Austrian  Hours: Mon 9:00 AM - 5:00 PM Tue 9:00 AM - 5:00 PM Wed 9:00 AM - 5:00 PM Thu 9:00 AM - 5:00 PM Fri 9:00 AM - 5:00 PM  Fee: Insurance  Accessibility: Blind accommodation, Deaf or hard of hearing, Translation services  Transportation Options: Free transportation    Case Management      [facility] - Coordinated Access  [address] [place] [address] (Distance: 6.4 miles)  Phone: [phone]  Language: English  Fee: Free  Accessibility: Translation services, Ada accessible      [facility] - Housing search assistance  [address] [place] [address] (Distance: 6.2 miles)  Phone: [phone]) [phone]  Language: English, Kittitian, Albaro, Hmong  Fee: Free  Accessibility: Ada accessible, Blind accommodation, Deaf or hard of hearing, Translation services      [employer] - Housing Stabilization Services  Phone: [phone]) 127-8901  Website: https://[employer].Art Circle/  Language: English  Hours: Mon 8:00 AM - 4:00 PM Tue 8:00 AM - 4:00 PM Wed 8:00 AM - 4:00 PM Thu 8:00 AM - 4:00 PM Fri 8:00 AM - 4:00 PM  Fee: Free  Accessibility: Blind accommodation, Deaf or hard of hearing  Transportation Options: Free transportation    Drop-In  Services      [facility] Warming or cooling center  [address] [place] [address] (Distance: 1.8 miles)  Language: Italian, English, Padma, Hmong, Maldivian, Romanian, Tamil  Fee: Free      [facility] - Project Recovery  [address] [place] [address] (Distance: 3.7 miles)  Phone: [phone]) [phone]  Language: English  Fee: Free      [facility] POSTAL SERVICE - MAIL SERVICE FOR THE HOMELESS  Phone: (124) 590-8757  Website: https://www.SplashMaps               IMPORTANT NUMBERS & WEBSITES        Emergency Services  911  .   United Way  211 http://211unitedway.org  .   Poison Control  (908) 799-7514 http://[place]poison.org http://wis[place]poison.org  .     Suicide and Crisis Lifeline  988 http://988[employer] pet food companyline.org  .   Childhelp [place] Child Abuse Hotline  872.813.6984 http://Childhelphotline.org   .   [place] Sexual Assault Hotline  (372) 281-5204 (HOPE) http://GEO'Supp.org   .     [place] Runaway Safeline  (723) 198-6509 (RUNAWAY) http://[employer]  .   Pregnancy & Postpartum Support  Call/text 281-563-9456  [place]: http://ppsupport[place].org  [place]: http://Serviceful.com/wi  .   Substance Abuse National Helpline ([facility])  764-689-HELP (2602) http://Findtreatment.gov   .                DISCLAIMER: These resources have been generated via the [employer]. [employer] does not endorse any service providers mentioned in this resource list. [employer] does not guarantee that the services mentioned in this resource list will be available to you or will improve your health or wellness.    [facility] [www.[facility].com]

## 2024-06-10 NOTE — PROGRESS NOTES
SUBJECTIVE:     HPI:    This is a 24 year old female patient,  who presents for her first obstetrical visit via telephone OB intake.    PEARL: 2025 She is 6w6d from known LMP 2024.  Her cycles are irregular.  Her last menstrual period was abnormal.   Since her LMP, she has experienced  headache, urinary frequency, hemorrhoids, and constipation).   She denies nausea, emesis, abdominal pain, headache, loss of appetite, vaginal discharge, dysuria, pelvic pain, urinary urgency, lightheadedness, and vaginal bleeding.    Additional History: . Reported 2 miscarriages and 1 alive child of 20 months old at 34 weeks premature delivery.  Due to high risk pregnancy of miscarriages, a sooner appointment for IOB has been scheduled with Dr. Alvarez for 24. Return OB with primary OB Dr. Iyer a month thereafter. Patient plans to be pregnant soon after current pregnancy.     Have you travelled during the pregnancy?No  Have your sexual partner(s) travelled during the pregnancy?No       HISTORY:   Planned Pregnancy: No Both parents are welcoming of pregnancy  Marital Status: Single (culturally )  Occupation: Patient not employ  Living in Household: Spouse and Children    Past History:  Her past medical history  two miscarriages, prematurely 34 weeks delivery, antepartum anemia, and hemorrhoid/constipation .      She has a history of  pre-term delivery at 34 weeks and pre-term labor    Since her last LMP she denies use of alcohol, tobacco and street drugs.    Past medical, surgical, social and family history were reviewed and updated in [facility].       Current Outpatient Medications   Medication Sig Dispense Refill    diltiazem 2% in PLO gel Apply small amount to anal opening three times daily for 8 weeks. (Patient not taking: Reported on 6/10/2024) 60 g 0    docusate sodium (COLACE) 100 MG capsule Take 1 capsule (100 mg) by mouth 2 times daily as needed for constipation (Patient not taking: Reported on  6/10/2024) 60 capsule 3    hydrocortisone, Perianal, (HYDROCORTISONE) 2.5 % cream Place rectally 2 times daily as needed for hemorrhoids (Patient not taking: Reported on 2024) 30 g 1    lidocaine (XYLOCAINE) 5 % external ointment Apply topically every 8 hours as needed for moderate pain (Patient not taking: Reported on 2024) 30 g 0    Prenatal Vit-Fe Fumarate-FA (PRENATAL PLUS) 27-1 MG TABS Take 1 tablet by mouth (Patient not taking: Reported on 6/10/2024)      witch hazel-glycerin (TUCKS) pad Apply topically as needed for hemorrhoids (Patient not taking: Reported on 2024) 100 each 2     No current facility-administered medications for this visit.        OBJECTIVE:     EXAM:  LMP 2024 (Exact Date)  There is no height or weight on file to calculate BMI.    - Telephone OB intake  - Denied fetal activity due to early gestational age  - Imaging phone number provided to patient.  OB provider to discuss with patient of further testing/ultrasound at future appointment.      ASSESSMENT/PLAN:       ICD-10-CM    1. Screen for STD (sexually transmitted disease)  Z11.3 UA Macroscopic with reflex to Microscopic and Culture - Lab Collect     Chlamydia trachomatis/Neisseria gonorrhoeae by PCR - Clinic Collect     Myriad Non-Invasive Prenatal Screening-Prequel      2. Supervision of normal first pregnancy, antepartum  Z34.00       3. History of  labor  Z87.51           24 year old , 6w6d of pregnancy with PEARL 2025 from known LMP 2024    Discussed as follows:     - Hepatitis C Antibody   - Lead Venous Blood  - Urine Macroscopic with reflex to micro and culture  - Hep C screening reflex w/ HCV RNA quant and genotype  - Treponema Abs W Reflex TO RPR and Titer  - Rubella Antibody IGG  - HIV Antigen Antibody Combo  - CBC with Platelets  - Hepatitis B Surface Antigen  - ABO/RH Type and Scsreen  - Chlamydia Trachomatis/Neisseria Gonorrhea vaginal swab  - MYRIAD testing discussed and  consent    Counseling given:   - IOB with Dr. Alvarez 6/21/24  - Follow up in 4-6 weeks for return OB visit with Dr. Iyer    PLAN/PATIENT INSTRUCTIONS:    New Prenatal Education  during nurse intake    Medications- Prenatal Vitamin, recommend one with DHA as this helps with development of eyes and brain, no specific brand recommendation   Water Intake-  ounces daily   Weight- monitored at each appointment   Common Symptoms- may experience shortness of breath, increased heart rate, nausea/vomiting, headaches, cramping, spotting, etc.  If symptoms not improved and or worsening, contact provider.        - Only take Tylenol (acetaminophen) for headaches/pain concerns   - Review remedies & OTC medication to help with common symptoms in pregnancy (see taking medication during pregnancy handout)     [place]- baby is protected, not uncommon to have light cramping or spotting, reach out if persisting or worsening    Diet   - Wash fruits and vegetables before eating raw or cooking   - Avoid unpasteurized products   - Avoid undercooked meat, poultry, fish (no raw fish) and eggs    - Reheat hot dogs and luncheon meats/cold cuts prior to consumption     Caffeine- limit to 200 mg/day (about 1.5 cups of coffee)   Abstain from smoking, alcohol, and drugs      Travel     - No travel 4-6 weeks out from due date   - Planes/lengthy drives- get up and walk every 1-2 hours for circulation, increased risk for DVT during pregnancy   - Seat belts- wear underneath belly not across it   - Air bags- back up seat      Disease and Infection     Risk of toxoplasmosis with cats  feces, have someone else change litter box during pregnancy, wear gloves when working outside and wash hands thoroughly   Avoid traveling to locations high risk for zika, use insect repellent, wear long sleeves and pants   Flu vaccine during flu season, COVID vaccine and TDAP   TDAP recommended with each pregnancy, make sure partner is up to date as well  (usually only once every 10 years)      Frequency of Appointments- unless advised otherwise   Every 4 weeks until 28 weeks   Every 2 weeks until 36 weeks   Weekly until delivery       Prenatal OB Questionnaire    Patient supplied answers from flow sheet for:  Prenatal OB Questionnaire.  Past Medical History  Have you ever recieved care for your mental health? : No  Have you ever been in a major accident or suffered serious trauma?: No  Within the last year, has anyone hit, slapped, kicked or otherwise hurt you?: No  In the last year, has anyone forced you to have sex when you didn't want to?: No    Past Medical History 2   Have you ever received a blood transfusion?: No  Would you accept a blood transfusion if was medically recommended?: Unknown  Does anyone in your home smoke?: No   Is your blood type Rh negative?: Unknown  Have you ever ?: (!) Yes  Have you been hospitalized for a nonsurgical reason excluding normal delivery?: No  Have you ever had an abnormal pap smear?: No    Past Medical History (Continued)  Do you have a history of abnormalities of the uterus?: No  Did your mother take TIO or any other hormones when she was pregnant with you?: No  Do you have any other problems we have not asked about which you feel may be important to this pregnancy?: No      Allergies as of 6/10/2024:    Allergies as of 06/10/2024    (No Known Allergies)       Current medications are:  Current Outpatient Medications   Medication Sig Dispense Refill    diltiazem 2% in PLO gel Apply small amount to anal opening three times daily for 8 weeks. (Patient not taking: Reported on 6/10/2024) 60 g 0    docusate sodium (COLACE) 100 MG capsule Take 1 capsule (100 mg) by mouth 2 times daily as needed for constipation (Patient not taking: Reported on 6/10/2024) 60 capsule 3    hydrocortisone, Perianal, (HYDROCORTISONE) 2.5 % cream Place rectally 2 times daily as needed for hemorrhoids (Patient not taking: Reported on 5/20/2024)  30 g 1    lidocaine (XYLOCAINE) 5 % external ointment Apply topically every 8 hours as needed for moderate pain (Patient not taking: Reported on 5/20/2024) 30 g 0    Prenatal Vit-Fe Fumarate-FA (PRENATAL PLUS) 27-1 MG TABS Take 1 tablet by mouth (Patient not taking: Reported on 6/10/2024)      witch hazel-glycerin (TUCKS) pad Apply topically as needed for hemorrhoids (Patient not taking: Reported on 5/20/2024) 100 each 2         Early ultrasound screening tool:    Does patient have irregular periods?  Yes  Did patient use hormonal birth control in the three months prior to positive urine pregnancy test? N/A  Is the patient breastfeeding?   Yes with baby at home. Pt plans to formula feed future.  Is the patient 10 weeks or greater at time of education visit?  No

## 2024-06-13 ENCOUNTER — HOSPITAL ENCOUNTER (OUTPATIENT)
Dept: ULTRASOUND IMAGING | Facility: HOSPITAL | Age: 24
Discharge: HOME | End: 2024-06-13
Attending: FAMILY MEDICINE | Admitting: FAMILY MEDICINE
Payer: COMMERCIAL

## 2024-06-13 DIAGNOSIS — O09.90 SUPERVISION OF HIGH RISK PREGNANCY, ANTEPARTUM: ICD-10-CM

## 2024-06-13 PROCEDURE — 76801 OB US < 14 WKS SINGLE FETUS: CPT

## 2024-06-19 ENCOUNTER — LAB (OUTPATIENT)
Dept: LAB | Facility: CLINIC | Age: 24
End: 2024-06-19
Payer: COMMERCIAL

## 2024-06-19 ENCOUNTER — E-VISIT (OUTPATIENT)
Dept: FAMILY MEDICINE | Facility: CLINIC | Age: 24
End: 2024-06-19
Payer: COMMERCIAL

## 2024-06-19 ENCOUNTER — MYC MEDICAL ADVICE (OUTPATIENT)
Dept: FAMILY MEDICINE | Facility: CLINIC | Age: 24
End: 2024-06-19
Payer: COMMERCIAL

## 2024-06-19 DIAGNOSIS — O23.41 URINARY TRACT INFECTION IN MOTHER DURING FIRST TRIMESTER OF PREGNANCY: ICD-10-CM

## 2024-06-19 DIAGNOSIS — O23.41 URINARY TRACT INFECTION IN MOTHER DURING FIRST TRIMESTER OF PREGNANCY: Primary | ICD-10-CM

## 2024-06-19 LAB
ALBUMIN UR-MCNC: NEGATIVE MG/DL
APPEARANCE UR: CLEAR
BACTERIA #/AREA URNS HPF: ABNORMAL /HPF
BILIRUB UR QL STRIP: NEGATIVE
COLOR UR AUTO: YELLOW
GLUCOSE UR STRIP-MCNC: NEGATIVE MG/DL
HGB UR QL STRIP: NEGATIVE
KETONES UR STRIP-MCNC: NEGATIVE MG/DL
LEUKOCYTE ESTERASE UR QL STRIP: ABNORMAL
MUCOUS THREADS #/AREA URNS LPF: PRESENT /LPF
NITRATE UR QL: NEGATIVE
PH UR STRIP: 6 [PH] (ref 5–7)
RBC #/AREA URNS AUTO: ABNORMAL /HPF
SP GR UR STRIP: 1.01 (ref 1–1.03)
SQUAMOUS #/AREA URNS AUTO: ABNORMAL /LPF
UROBILINOGEN UR STRIP-ACNC: 0.2 E.U./DL
WBC #/AREA URNS AUTO: ABNORMAL /HPF

## 2024-06-19 PROCEDURE — 99421 OL DIG E/M SVC 5-10 MIN: CPT | Performed by: FAMILY MEDICINE

## 2024-06-19 PROCEDURE — 87086 URINE CULTURE/COLONY COUNT: CPT

## 2024-06-19 PROCEDURE — 87186 SC STD MICRODIL/AGAR DIL: CPT

## 2024-06-19 PROCEDURE — 81001 URINALYSIS AUTO W/SCOPE: CPT

## 2024-06-19 RX ORDER — AMOXICILLIN AND CLAVULANATE POTASSIUM 500; 125 MG/1; MG/1
1 TABLET, FILM COATED ORAL 2 TIMES DAILY
Qty: 14 TABLET | Refills: 0 | Status: SHIPPED | OUTPATIENT
Start: 2024-06-19 | End: 2024-06-26

## 2024-06-19 NOTE — PATIENT INSTRUCTIONS
Dear Gisela Moon,     I've received your E-Visit. I strongly recommend checking a urine sample before starting antibiotics if at all possible.  Please make a lab appointment for this.  If you can come to [place] today before 5:45, let me know and I'll add you on if you are unable to get it to work via [employer].     Schedule a Lab Only appointment here.     I am sending a prescription now for antibiotics that are safe to take while pregnant.  If at all possible, please leave your urine sample first.  If you are unable to leave a urine sample, it's acceptable to go ahead and start the antibiotics.  It's just that it would be BEST to be able do a urine culture.     Guadalupe Alvarez MD

## 2024-06-19 NOTE — TELEPHONE ENCOUNTER
Writer replied to patient via [employer].  CLARIBEL RomanN, RN (she/her)  [facility] RN

## 2024-06-20 NOTE — TELEPHONE ENCOUNTER
Responded to patient via MyChart - pharmacy medication sent to provided.      J Luis Wheatley RN  [facility]

## 2024-06-21 ENCOUNTER — PRENATAL OFFICE VISIT (OUTPATIENT)
Dept: FAMILY MEDICINE | Facility: CLINIC | Age: 24
End: 2024-06-21
Payer: COMMERCIAL

## 2024-06-21 ENCOUNTER — MYC MEDICAL ADVICE (OUTPATIENT)
Dept: FAMILY MEDICINE | Facility: CLINIC | Age: 24
End: 2024-06-21

## 2024-06-21 VITALS
OXYGEN SATURATION: 99 % | SYSTOLIC BLOOD PRESSURE: 96 MMHG | HEART RATE: 63 BPM | TEMPERATURE: 98.4 F | WEIGHT: 118.75 LBS | BODY MASS INDEX: 23.31 KG/M2 | RESPIRATION RATE: 16 BRPM | HEIGHT: 60 IN | DIASTOLIC BLOOD PRESSURE: 64 MMHG

## 2024-06-21 DIAGNOSIS — O23.41 URINARY TRACT INFECTION IN MOTHER DURING FIRST TRIMESTER OF PREGNANCY: ICD-10-CM

## 2024-06-21 DIAGNOSIS — O09.891 HX OF PRETERM DELIVERY, CURRENTLY PREGNANT, FIRST TRIMESTER: Primary | ICD-10-CM

## 2024-06-21 DIAGNOSIS — Z34.90 PREGNANCY, UNSPECIFIED GESTATIONAL AGE: Primary | ICD-10-CM

## 2024-06-21 DIAGNOSIS — Z87.51 HISTORY OF PRETERM DELIVERY: ICD-10-CM

## 2024-06-21 DIAGNOSIS — K60.2 ANAL FISSURE: ICD-10-CM

## 2024-06-21 DIAGNOSIS — Z87.59 HISTORY OF PRETERM PREMATURE RUPTURE OF MEMBRANES (PPROM): ICD-10-CM

## 2024-06-21 DIAGNOSIS — O09.90 SUPERVISION OF HIGH RISK PREGNANCY, ANTEPARTUM: Primary | ICD-10-CM

## 2024-06-21 LAB — BACTERIA UR CULT: ABNORMAL

## 2024-06-21 PROCEDURE — 99214 OFFICE O/P EST MOD 30 MIN: CPT | Performed by: FAMILY MEDICINE

## 2024-06-21 RX ORDER — FOLIC ACID 0.8 MG
800 TABLET ORAL DAILY
Qty: 30 TABLET | Refills: 1 | Status: SHIPPED | OUTPATIENT
Start: 2024-06-21 | End: 2024-06-24 | Stop reason: ALTCHOICE

## 2024-06-21 RX ORDER — POLYETHYLENE GLYCOL 3350 17 G/17G
1 POWDER, FOR SOLUTION ORAL DAILY
COMMUNITY

## 2024-06-21 NOTE — PROGRESS NOTES
PRENATAL VISIT:  INITIAL OB    Assessment /Plan     Gisela Moon is a 24 year old  at 6w1d with an EDC of 2025, Alternate PEARL Entry, here for Initial OB Appointment.    Gisela was seen today for prenatal care.    Diagnoses and all orders for this visit:    Supervision of high risk pregnancy, antepartum  Will ask patient to take folic acid supplement for the first trimester; okay to avoid full prenatal vitamin due to it possibly worsening nausea and constipation which are baseline problem for her already.  She should have another ultrasound, since the first one was too early.  I assume that they will likely go ahead and do this at maternal-fetal medicine.  However, if they are not going to normally see her for this in the next couple of weeks, she can send me a [employer] message and I could order for a routine ultrasound to be done at [facility].  We did not get it OB labs today since she is only 6 weeks.  She be interested in NIPT, which could potentially be done at the next visit along with all other labs.  Therefore get labs next visit.  She is going to stop by the  today and get the rest of her OB appointment scheduled.  She will be seeing Dr. Iyer, who is her primary physician and who took care of her during her last pregnancy.  -     folic acid 800 MCG tablet; Take 1 tablet (800 mcg) by mouth daily  Preeclampsia risk factors (per ACOG Dec 2021 Guidelines):  High risk factors (1+): none  Moderate risk factors (2+): none  Based on her risk factors, Gisela Fuller is NOT at high risk of preeclampsia. Low-dose aspirin prophylaxis is NOT recommended for prevention of preeclampsia.     History of  premature rupture of membranes (PPROM)  History of  delivery  Discussed increased risk of  delivery with this pregnancy and recommendation for her to see maternal-fetal medicine for evaluation and consultation.  Patient is agreeable; so referred  -     [facility] Fetal Med Ctr  Referral - Pregnancy; Future    Urinary tract infection in mother during first trimester of pregnancy  Patient had sent an e-visit 2 days ago with dysuria.  Week she was able to leave urine sample and this ultimately did grow out ,000 colonies per mL of pansensitive E. coli.  Was started on Augmentin and is already starting to feel a bit better.  Recommend she continue full course of medication to completely eradicate this bacteria.  Recommend to recheck urine culture next visit    Anal fissure  Patient has anal fissure which sounds like it is healing by taking MiraLAX daily.  I recommend to continue this during pregnancy, particularly given increased risk of constipation during pregnancy.  She has an appointment coming up with colorectal surgery and she certainly could keep that appointment if she wishes.  She wonders if Botox is an option for her while she is pregnant.  We did discuss that we do not have full information on the safety of Botox during pregnancy, but most people think that it is likely safe and there is no evidence to the contrary of which I am aware.  If she is doing well and not needing the Botox, could skip it for now.  But if she is suffering with anal fissure, if it would likely be worth the negligible risk.         Discussed orientation, general information, lifestyle, nutrition, exercise,warning signs, resources, lab testing, risk screening.  Questions answered.    Return to clinic in about 1 month for next OB visit.    I spent a total of 32 minutes on the day of the visit.   Time spent by me doing chart review, history and exam, documentation and further activities per the note     Subjective:    Gisela Moon is a 24 year old  here today for her First Obstetrical Exam.     Having some nausea  She is submitted an e-visit a couple of days ago for dysuria and and got a prescription for Augmentin.  She is starting to feel better, but not yet back to normal.    Discussed history of anal  "fissure.  This seems like it is mostly healing.  She is not having exquisite pain during defecation.  Is keeping bowel movements soft with MiraLAX.  Had some bleeding in the past but none recently.    OB History    Para Term  AB Living   4 1 0 1 2 1   SAB IAB Ectopic Multiple Live Births   2 0 0 0 1      # Outcome Date GA Lbr Donald/2nd Weight Sex Type Anes PTL Lv   4 Current            3 SAB 23     SAB      2  10/07/22 34w0d 07:45 / 00:29 1.99 kg (4 lb 6.2 oz) M Vag-Spont IV Y JENNIFER      Name: Rey Leyva      Apgar1: 7  Apgar5: 9   1 SAB 2021               Past Medical History:   Diagnosis Date    Infertility, female     Polycystic ovary syndrome      History reviewed. No pertinent surgical history.  Social History     Tobacco Use    Smoking status: Never     Passive exposure: Never    Smokeless tobacco: Never   Vaping Use    Vaping status: Never Used   Substance Use Topics    Alcohol use: Never    Drug use: Never     Current Outpatient Medications   Medication Sig Dispense Refill    amoxicillin-clavulanate (AUGMENTIN) 500-125 MG tablet Take 1 tablet by mouth 2 times daily for 7 days 14 tablet 0    folic acid 800 MCG tablet Take 1 tablet (800 mcg) by mouth daily 30 tablet 1    polyethylene glycol (MIRALAX) 17 GM/Dose powder Take 1 Capful by mouth daily      Prenatal Vit-Fe Fumarate-FA (PRENATAL PLUS) 27-1 MG TABS Take 1 tablet by mouth       No Known Allergies    Family History   Problem Relation Age of Onset    Other - See Comments Mother         COVID    Heart Disease Father     Liver Disease Father     Polycystic ovary syndrome Sister     No Known Problems Brother     Hypertension Brother        Objective:   Objective    Vitals:    24 0924   BP: 96/64   Pulse: 63   Resp: 16   Temp: 98.4  F (36.9  C)   TempSrc: Oral   SpO2: 99%   Weight: 53.9 kg (118 lb 12 oz)   Height: 1.52 m (4' 11.84\")     Physical Exam:  General Appearance: Alert, cooperative, no distress, appears stated " age  Head: Normocephalic, without obvious abnormality, atraumatic  Eyes: PERRL  Ears: Normal TM's and external ear canals, both ears  Neck: Supple, symmetrical, trachea midline, no adenopathy;  thyroid: not enlarged, symmetric, no tenderness/mass/nodules  Back: Symmetric, no curvature, ROM normal, no CVA tenderness  Lungs: Clear to auscultation bilaterally, respirations unlabored  Heart: Regular rate and rhythm, S1 and S2 normal, no murmur, rub, or gallop.  Abdomen: Soft, non-tender, bowel sounds active all four quadrants,  no masses, no organomegaly  Extremities: Extremities normal, atraumatic, no cyanosis or edema  Skin: Skin color, texture, turgor normal, no rashes or lesions  Lymph nodes: Cervical, supraclavicular, and axillary nodes normal  Neurologic: Normal

## 2024-06-21 NOTE — PATIENT INSTRUCTIONS
Referral Details       Referred By   Referred To    Guadalupe Alvarez MD   [address] [place] [address]   Phone: [phone]   Fax: [phone]    Diagnoses: History of  premature rupture of membranes (PPROM)   History of  delivery   Order: Mat Fetal Med Ctr Referral - Pregnancy    [facility] [place]   [address] [address]   Phone: [phone]   Fax: [phone]       Comment: Body mass index is 23.31 kg/m .        >> Patient may proceed with recommendations for further testing as directed by the Maternal Fetal Medicine Specialist >>        >> If requesting Fetal Echo: MFM will determine appropriate location for exam due to indication.         Please be aware that coverage of these services is subject to the terms and limitations of your health insurance plan.  Call member services at your health plan with any benefit or coverage questions.                     Ways to reduce pregnancy-induced nausea.  1. Eating as soon as you feel hungry, or even before you feel hungry   2. Snacking often and eating small meals. The best foods to eat have lots of protein or carbohydrates, but not a lot of fat. Good choices are crackers, bread, and low-fat yogurt. You should also avoid spicy foods.   3. Drinking cold, clear beverages that are either fizzy or sour. Good choices are lemonade and ginger ale.   4. Eating ginger flavored lollipops   5. Smelling fresh lemon, mint, or orange   6. Brushing your teeth right after you eat  7. Over the counter remedies  that are safe to treat nausea in pregnancy:    a.  Vitamin B6 (25mg-50mg every 8 hours)    b.  Doxylamine (Unisom TABLETS or Good Sense Sleep Aid) (1/2-1 tablet every 8 hours)    c.  Diphenhydramine (Benadryl) or meclizine (Dramamine)    d.  Accupressure bands (Sea Bands)

## 2024-06-27 ENCOUNTER — MYC MEDICAL ADVICE (OUTPATIENT)
Dept: FAMILY MEDICINE | Facility: CLINIC | Age: 24
End: 2024-06-27
Payer: COMMERCIAL

## 2024-06-28 ENCOUNTER — HOSPITAL ENCOUNTER (OUTPATIENT)
Dept: ULTRASOUND IMAGING | Facility: HOSPITAL | Age: 24
Discharge: HOME | End: 2024-06-28
Attending: FAMILY MEDICINE | Admitting: FAMILY MEDICINE
Payer: COMMERCIAL

## 2024-06-28 ENCOUNTER — NURSE TRIAGE (OUTPATIENT)
Dept: FAMILY MEDICINE | Facility: CLINIC | Age: 24
End: 2024-06-28
Payer: COMMERCIAL

## 2024-06-28 DIAGNOSIS — Z34.90 PREGNANCY, UNSPECIFIED GESTATIONAL AGE: ICD-10-CM

## 2024-06-28 PROCEDURE — 76801 OB US < 14 WKS SINGLE FETUS: CPT

## 2024-06-28 NOTE — TELEPHONE ENCOUNTER
"Nurse Triage SBAR    Is this a 2nd Level Triage? NO    Situation: itchiness on vulva     Background:   - symptoms started 2-3 days ago  - pt stated she's had similar symptoms before after giving birth to her first child    Assessment:   - no pain  - no fever  - itchiness and a little bit dry  - pt also mention she has dryness on her clitoris   - some discomfort during sex as well    Protocol Recommended Disposition:   See in Office Today or Tomorrow    Recommendation: offered appt today but pt refused as it did not work with her scheduled. Pt stated she has an ultrasound appt today at the [facility] and she will go to the walk-in clinic there after her appt.         Chauncey Bal Cem Say, BSN RN  [facility]        Reason for Disposition   MODERATE-SEVERE itching (i.e., interferes with school, work, or sleep)    Additional Information   Negative: Pain or burning with passing urine (urination) is main symptom   Negative: Vaginal discharge is main symptom   Negative: Pubic lice suspected   Negative: STI exposure and prevention, question about   Negative: Patient sounds very sick or weak to the triager   Negative: SEVERE pain (e.g., excruciating)   Negative: Genital area looks infected (e.g., draining sore, spreading redness)   Negative: Rash with painful tiny water blisters   Negative: Patient wants to be seen    Answer Assessment - Initial Assessment Questions  1. SYMPTOM: \"What's the main symptom you're concerned about?\" (e.g., rash, itching, swelling, dryness)      Itching and some dryness    2. LOCATION: \"Where is the itching located?\" (e.g., inside/outside, left/right)      Right on opening of the vagina on both side. Also has some dry skin peeling     3. ONSET: \"When did the  symptoms  start?\"      Started about 2-3 days ago    4. PAIN: \"Is there any pain?\" If Yes, ask: \"How bad is it?\" (Scale: 1-10; mild, moderate, severe)    -  MILD (1-3): Doesn't interfere with normal activities.     -  " "MODERATE (4-7): Interferes with normal activities (e.g., work or school) or awakens from sleep.      -  SEVERE (8-10): Excruciating pain, unable to do any normal activities.      no    5. CAUSE: \"What do you think is causing the symptoms?\"      Not    6. OTHER SYMPTOMS: \"Do you have any other symptoms?\" (e.g., fever, vaginal bleeding, pain with urination)      None    7. PREGNANCY: \"Is there any chance you are pregnant?\" \"When was your last menstrual period?\"      yes    Protocols used: Vulvar Symptoms-A-OH    "

## 2024-06-29 ENCOUNTER — MYC MEDICAL ADVICE (OUTPATIENT)
Dept: FAMILY MEDICINE | Facility: CLINIC | Age: 24
End: 2024-06-29
Payer: COMMERCIAL

## 2024-06-29 ENCOUNTER — DOCUMENTATION ONLY (OUTPATIENT)
Dept: FAMILY MEDICINE | Facility: CLINIC | Age: 24
End: 2024-06-29
Payer: COMMERCIAL

## 2024-07-01 DIAGNOSIS — Z87.59 HISTORY OF PRETERM PREMATURE RUPTURE OF MEMBRANES (PPROM): Primary | ICD-10-CM

## 2024-07-01 NOTE — TELEPHONE ENCOUNTER
Chart reviewed. Please review findings below.   EXAM: US OB < 14 WEEKS SINGLE-TRANSABDOMINAL  LOCATION: [facility]  DATE: 6/28/2024     INDICATION: Pregnant, Follow up viability (first ultrasound to early to see fetal heartbeat).  COMPARISON: 6/13/2024.  TECHNIQUE: Transabdominal scans were performed. Endovaginal ultrasound was performed to better visualize the embryo.     FINDINGS:  UTERUS: Single normal appearing intrauterine gestational sac containing a single living fetus and a yolk sac.  CRL: Measures 1.1 cm, equals 7 weeks and 2 days.  FETAL HEART RATE: 142 bpm.  AMNIOTIC FLUID: Normal.  PLACENTA: Not yet formed. No evidence for sub-chorionic hemorrhage.     RIGHT OVARY: Normal.  LEFT OVARY: Normal.                                                                      IMPRESSION:   Single living intrauterine gestation at 7 weeks and 2 days, EDC 2/12/2025.

## 2024-07-17 LAB
ABO/RH(D): NORMAL
ANTIBODY SCREEN: NEGATIVE
SPECIMEN EXPIRATION DATE: NORMAL

## 2024-07-18 ENCOUNTER — PRENATAL OFFICE VISIT (OUTPATIENT)
Dept: FAMILY MEDICINE | Facility: CLINIC | Age: 24
End: 2024-07-18
Payer: COMMERCIAL

## 2024-07-18 VITALS
HEIGHT: 60 IN | DIASTOLIC BLOOD PRESSURE: 59 MMHG | OXYGEN SATURATION: 99 % | WEIGHT: 120.56 LBS | SYSTOLIC BLOOD PRESSURE: 100 MMHG | BODY MASS INDEX: 23.67 KG/M2 | HEART RATE: 83 BPM | TEMPERATURE: 97.9 F | RESPIRATION RATE: 12 BRPM

## 2024-07-18 DIAGNOSIS — Z11.3 SCREEN FOR STD (SEXUALLY TRANSMITTED DISEASE): ICD-10-CM

## 2024-07-18 DIAGNOSIS — Z87.51 HISTORY OF PRETERM LABOR: ICD-10-CM

## 2024-07-18 DIAGNOSIS — Z34.90 PREGNANCY, UNSPECIFIED GESTATIONAL AGE: Primary | ICD-10-CM

## 2024-07-18 PROBLEM — Z91.89: Status: RESOLVED | Noted: 2024-05-21 | Resolved: 2024-07-18

## 2024-07-18 LAB
ALBUMIN UR-MCNC: NEGATIVE MG/DL
APPEARANCE UR: CLEAR
BILIRUB UR QL STRIP: NEGATIVE
COLOR UR AUTO: YELLOW
GLUCOSE UR STRIP-MCNC: NEGATIVE MG/DL
HBV SURFACE AG SERPL QL IA: NONREACTIVE
HCV AB SERPL QL IA: NONREACTIVE
HGB UR QL STRIP: NEGATIVE
HIV 1+2 AB+HIV1 P24 AG SERPL QL IA: NONREACTIVE
KETONES UR STRIP-MCNC: NEGATIVE MG/DL
LEUKOCYTE ESTERASE UR QL STRIP: NEGATIVE
NITRATE UR QL: NEGATIVE
PH UR STRIP: 7 [PH] (ref 5–7)
RUBV IGG SERPL QL IA: 1.36 INDEX
RUBV IGG SERPL QL IA: POSITIVE
SP GR UR STRIP: 1.02 (ref 1–1.03)
T PALLIDUM AB SER QL: NONREACTIVE
UROBILINOGEN UR STRIP-ACNC: 0.2 E.U./DL

## 2024-07-18 PROCEDURE — 86803 HEPATITIS C AB TEST: CPT | Performed by: FAMILY MEDICINE

## 2024-07-18 PROCEDURE — 87389 HIV-1 AG W/HIV-1&-2 AB AG IA: CPT | Performed by: FAMILY MEDICINE

## 2024-07-18 PROCEDURE — 99207 PR PRENATAL VISIT: CPT | Performed by: FAMILY MEDICINE

## 2024-07-18 PROCEDURE — 36415 COLL VENOUS BLD VENIPUNCTURE: CPT | Performed by: FAMILY MEDICINE

## 2024-07-18 PROCEDURE — 86850 RBC ANTIBODY SCREEN: CPT | Performed by: FAMILY MEDICINE

## 2024-07-18 PROCEDURE — 81003 URINALYSIS AUTO W/O SCOPE: CPT | Performed by: FAMILY MEDICINE

## 2024-07-18 PROCEDURE — 86900 BLOOD TYPING SEROLOGIC ABO: CPT | Performed by: FAMILY MEDICINE

## 2024-07-18 PROCEDURE — 86762 RUBELLA ANTIBODY: CPT | Performed by: FAMILY MEDICINE

## 2024-07-18 PROCEDURE — 87591 N.GONORRHOEAE DNA AMP PROB: CPT | Mod: 93

## 2024-07-18 PROCEDURE — 86901 BLOOD TYPING SEROLOGIC RH(D): CPT | Performed by: FAMILY MEDICINE

## 2024-07-18 PROCEDURE — 86780 TREPONEMA PALLIDUM: CPT | Performed by: FAMILY MEDICINE

## 2024-07-18 PROCEDURE — 87340 HEPATITIS B SURFACE AG IA: CPT | Performed by: FAMILY MEDICINE

## 2024-07-18 PROCEDURE — 87491 CHLMYD TRACH DNA AMP PROBE: CPT | Mod: 93

## 2024-07-18 PROCEDURE — 83655 ASSAY OF LEAD: CPT | Mod: 90 | Performed by: FAMILY MEDICINE

## 2024-07-18 PROCEDURE — 99000 SPECIMEN HANDLING OFFICE-LAB: CPT | Performed by: FAMILY MEDICINE

## 2024-07-18 NOTE — PROGRESS NOTES
"SUBJECTIVE:   at 10w1d. Estimated Date of Delivery: 2025.  She is doing well. She denies any vomiting, nausea. She denies abdominal pain/cramping, vaginal bleeding, leakage of fluid. Fetal movement is not yet present.   Concerns: none  ROS  Negative except as noted above in HPI.  OBJECTIVE:  Blood pressure 100/59, pulse 83, temperature 97.9  F (36.6  C), temperature source Oral, resp. rate 12, height 1.532 m (5' 0.32\"), weight 54.7 kg (120 lb 9 oz), last menstrual period 2024, SpO2 99%, not currently breastfeeding.  Gen: comfortable, no acute distress.  See OB Vitals flowsheet.  ASSESSMENT/PLAN:  Gisela was seen today for prenatal care.    Diagnoses and all orders for this visit:    Pregnancy, unspecified gestational age       Myriad Non-Invasive Prenatal Screening-Prequel- I also reviewed SMA, CF carrier screening and expanded carrier screening- she declines today but will check with insurance and let me know if she wishes to have this done.    Screen for STD (sexually transmitted disease): Asymptomatic screening as recommended in pregnancy.  -     UA Macroscopic with reflex to Microscopic and Culture - Lab Collect  -     HIV Antigen Antibody Combo  -     Treponema Abs w Reflex to RPR and Titer    History of  labor:  labor and PPROM at 33 weeks, with delivery at 34 weeks. Reviewed plan for cervical length at 16 weeks- already has upcoming appointment with [facility].       Other orders  -     Rubella Antibody IgG  -     Hepatitis C antibody  -     Lead, Venous Blood  -     ABO/Rh type and screen  -     Hepatitis B surface antigen      -IUP at 10w1d:   Prenatal labs drawn today   Post-partum Contraception: uncertain- does wish to have more children in the future  Breast/Bottle: formula only   RTC in 4 weeks or sooner with problems.      Cassie Iyer MD    "

## 2024-07-18 NOTE — PATIENT INSTRUCTIONS
Weeks 10 to 14 of Your Pregnancy: Care Instructions  It's now possible to hear the fetus's heartbeat with a special ultrasound device. And the fetus's organs are developing.    Decide about tests to check for birth defects. Think about your age, your chance of passing on a family disease, your need to know about any problems, and what you might do after you have the test results.    It's okay to exercise. Try activities such as walking or swimming. Check with your doctor before starting a new program.    You may feel more tired than usual.  Taking naps during the day may help.     You may feel emotional.  It might help to talk to someone.     You may have headaches.  Try lying down and putting a cool cloth over your forehead.     You can use acetaminophen (Tylenol) for pain relief.  Don't take any anti-inflammatory medicines (such as Advil, Motrin, Aleve), unless your doctor says it's okay.     You may feel a fullness or aching in your lower belly.  This can feel like the kind of cramps you might get before a period. A back rub may help.     You may need to urinate more.  Your growing uterus and changing hormones can affect your bladder.     You may feel sick to your stomach (morning sickness).  Try avoiding food and smells that make you feel sick.     Your breasts may feel different.  They may feel tender or get bigger. Your nipples may get darker. Try a bra that gives you good support.     Avoid alcohol, tobacco, and drugs (including marijuana).  If you need help quitting, talk to your doctor.     Take a daily prenatal vitamin.  Choose one with folic acid.   Follow-up care is a key part of your treatment and safety. Be sure to make and go to all appointments, and call your doctor if you are having problems. It's also a good idea to know your test results and keep a list of the medicines you take.  Where can you learn more?  Go to https://www.healthwise.net/patiented  Enter E090 in the search box to learn more  "about \"Weeks 10 to 14 of Your Pregnancy: Care Instructions.\"  Current as of: July 10, 2023               Content Version: 14.0    3018-4913 [employer].   Care instructions adapted under license by your healthcare professional. If you have questions about a medical condition or this instruction, always ask your healthcare professional. [employer] disclaims any warranty or liability for your use of this information.      "

## 2024-07-19 LAB
C TRACH DNA SPEC QL PROBE+SIG AMP: NEGATIVE
N GONORRHOEA DNA SPEC QL NAA+PROBE: NEGATIVE

## 2024-07-20 LAB — LEAD BLDV-MCNC: <2 UG/DL

## 2024-07-24 ENCOUNTER — PRE VISIT (OUTPATIENT)
Dept: MATERNAL FETAL MEDICINE | Facility: HOSPITAL | Age: 24
End: 2024-07-24
Payer: COMMERCIAL

## 2024-07-24 LAB — SCANNED LAB RESULT: NORMAL

## 2024-07-29 ENCOUNTER — OFFICE VISIT (OUTPATIENT)
Dept: MATERNAL FETAL MEDICINE | Facility: HOSPITAL | Age: 24
End: 2024-07-29
Attending: STUDENT IN AN ORGANIZED HEALTH CARE EDUCATION/TRAINING PROGRAM
Payer: COMMERCIAL

## 2024-07-29 ENCOUNTER — ANCILLARY PROCEDURE (OUTPATIENT)
Dept: ULTRASOUND IMAGING | Facility: HOSPITAL | Age: 24
End: 2024-07-29
Attending: STUDENT IN AN ORGANIZED HEALTH CARE EDUCATION/TRAINING PROGRAM
Payer: COMMERCIAL

## 2024-07-29 VITALS
RESPIRATION RATE: 16 BRPM | HEART RATE: 65 BPM | OXYGEN SATURATION: 99 % | SYSTOLIC BLOOD PRESSURE: 112 MMHG | DIASTOLIC BLOOD PRESSURE: 70 MMHG | TEMPERATURE: 98.6 F

## 2024-07-29 DIAGNOSIS — Z82.79 FAMILY HISTORY OF BIRTH DEFECT: ICD-10-CM

## 2024-07-29 DIAGNOSIS — Z87.59 HISTORY OF PRETERM PREMATURE RUPTURE OF MEMBRANES (PPROM): ICD-10-CM

## 2024-07-29 DIAGNOSIS — Z31.5 ENCOUNTER FOR PROCREATIVE GENETIC COUNSELING: Primary | ICD-10-CM

## 2024-07-29 DIAGNOSIS — O09.891 HISTORY OF PRETERM DELIVERY, CURRENTLY PREGNANT IN FIRST TRIMESTER: Primary | ICD-10-CM

## 2024-07-29 PROCEDURE — 76801 OB US < 14 WKS SINGLE FETUS: CPT

## 2024-07-29 PROCEDURE — 99203 OFFICE O/P NEW LOW 30 MIN: CPT | Mod: 25 | Performed by: OBSTETRICS & GYNECOLOGY

## 2024-07-29 PROCEDURE — 76801 OB US < 14 WKS SINGLE FETUS: CPT | Mod: 26 | Performed by: OBSTETRICS & GYNECOLOGY

## 2024-07-29 PROCEDURE — 96040 HC GENETIC COUNSELING, EACH 30 MINUTES: CPT | Performed by: GENETIC COUNSELOR, MS

## 2024-07-29 PROCEDURE — G0463 HOSPITAL OUTPT CLINIC VISIT: HCPCS | Mod: 25 | Performed by: OBSTETRICS & GYNECOLOGY

## 2024-07-29 NOTE — PROGRESS NOTES
[facility]  Genetic Counseling Consult    Patient:  Gisela Moon  Preferred Name: Gisela YOB: 2000   Date of Service:  24   MRN: [id number]    Gisela was seen at the [facility] for genetic consultation. The indication for genetic counseling is desire to discuss options for genetic screening and diagnostics. The patient was unaccompanied to this visit.     The session was conducted in English.      IMPRESSION/ PLAN   1. Gisela Fuller had genetic screening earlier in this pregnancy. Their non-invasive prenatal test was screen negative or low risk for screened conditions     2. During today's [facility] visit, Gisela Fuller had a genetic counseling session only. Additional screening and diagnostic testing was discussed and declined.     3. Gisela Fuller had a nuchal translucency ultrasound and [facility] physician consult today. Please see the ultrasound report for further details and recommendations for management.    PREGNANCY HISTORY   /Parity:       Gisela Fuller's pregnancy history is significant for:   1  delivery at 34 weeks. This history was the focus of Gisela Fuller's physician consult with Dr. Soler today, please see corresponding documentation for details and recommendations for management  2 miscarriages early in the first trimester with no known cause.  We discussed that an evaluation for recurrent miscarriage would be appropriate if she were to ever experience another pregnancy loss.    CURRENT PREGNANCY   Current Age: 24 year old   Age at Delivery: 24 year old  PEARL: 2025, by Ultrasound                                   Gestational Age: 11w5d  This pregnancy is a single gestation.   This pregnancy was conceived spontaneously.  Gisela Fuller reports the following complications and/or exposure concerns in this pregnancy: NONE    MEDICAL HISTORY   Gisela Fuller s reported medical history is not expected to  "impact pregnancy management or risks to fetal development.       FAMILY HISTORY   A three-generation pedigree was obtained today and is scanned under the \"Media\" tab in Epic. The family history was reported by Gisela Fuller.    The following significant findings were reported today:   Gisela Fuller reports that her  has a nephew who was born with congenital differences in his hands and feet described as under developed and abnormally bent hands and feet.  This child is now 4 years old with no other health or developmental concerns.  Without knowing a precise cause for this child's concerns, an exact risk estimate is not possible.  A comprehensive ultrasound with MFM is recommended based on this history.    Otherwise, the reported family history is unremarkable for multiple miscarriages, stillbirths, birth defects, intellectual disabilities, known genetic conditions, and consanguinity.       RISK ASSESSMENT FOR INHERITED CONDITIONS AND CARRIER SCREENING OPTIONS   Expanded carrier screening is available to screen for autosomal recessive conditions and X-linked conditions in a large list of genes. Carrier screening does not test the pregnancy but gives a risk assessment for the pregnancy and future pregnancies to have the condition. Expanded carrier screening is designed to identify carrier status for conditions that are primarily childhood or adolescent onset. Expanded carrier screening does not evaluate for adult-onset conditions such as hereditary cancer syndromes, dementia/ Alzheimer's disease, or cardiovascular disease risk factors. Additionally, expanded carrier screening is not comprehensive for all known genetic diseases or inherited conditions. Carrier screening does not test for all genetic and health conditions or risk factors.     Autosomal recessive conditions happen when a mutation has been inherited from the egg and sperm and include conditions like cystic fibrosis, thalassemia, hearing loss, spinal " muscular atrophy, and more. We reviewed that when both biological parents carry a harmful genetic change in a gene associated with autosomal recessive inheritance, each of their pregnancies has a 1 in 4 (25%) chance to be affected by that condition. X-linked conditions happen when a mutation has been inherited from the egg and include conditions like fragile X syndrome.With x-linked conditions, the specific risk generally depends on the chromosomal sex of the fetus, with XY individuals (generally male) being most severely affected.     [place] screening was not reviewed due to focus on other topics.  About [place]  Screening    The patient does NOT have a family history of known inherited conditions. This does NOT mean the patient and/or their partner is not a carrier of a condition. Approximately 90% of couples at an increased reproductive risk for an inherited condition have no family history of that condition.     The patient has not had carrier screening previously.     Carrier screening was not discussed today. If the patient is interested in further discussing the option of carrier screening, MFM would be available to assist in coordination if desired.      RISK ASSESSMENT FOR CHROMOSOME CONDITIONS   We explained that the risk for fetal chromosome abnormalities increases with maternal age. We discussed specific features of common chromosome abnormalities, including Down syndrome, trisomy 13, trisomy 18, and sex chromosome trisomies.    At age 24 at midtrimester, the risk to have a baby with Down syndrome is 1 in 1087.  At age 24 at midtrimester, the risk to have a baby with any chromosome abnormality is 1 in 544.     Gisela Fuller had genetic screening earlier in this pregnancy. Their non-invasive prenatal test was screen negative or low risk for screened conditions     Non-invasive prenatal testing (NIPT) results  Maternal plasma cell-free DNA testing  Screens for fetal trisomy 21, trisomy 13, trisomy 18, and  sex chromosome aneuploidy  Gisela Fuller had an NIPT test earlier in pregnancy; we reviewed the results today, which are low risk.  The NIPT did include sex chromosome aneuploidies and the result was low risk. The predicted sex is XX, which is typically female.  Given the accuracy of this test, these results greatly decrease the chance for certain fetal chromosome abnormalities  We discussed the limitations of normal NIPT results  Maternal serum AFP only to screen for open neural tube defects (after 15 weeks) is not yet available due to early gestation but could be done after 15 weeks.     GENETIC TESTING OPTIONS   Genetic testing during a pregnancy includes screening and diagnostic procedures.      Screening tests are non-invasive which means no risk to the pregnancy and includes ultrasounds and blood work. The benefits and limitations of screening were reviewed. Screening tests provide a risk assessment (chance) specific to the pregnancy for certain fetal chromosome abnormalities but cannot definitively diagnose or exclude a fetal chromosome abnormality. Follow-up genetic counseling and consideration of diagnostic testing is recommended with any abnormal screening result. Diagnostic testing during a pregnancy is more certain and can test for more conditions. However, the tests do have a risk of miscarriage that requires careful consideration. These tests can detect fetal chromosome abnormalities with greater than 99% certainty. Results can be compromised by maternal cell contamination or mosaicism and are limited by the resolution of current genetic testing technology.     There is no screening or diagnostic test that detects all forms of birth defects or intellectual disability.     We discussed the following screening options:     Non-invasive prenatal testing (NIPT)  Also called cell-free DNA screening because it detects chromosomes from the placenta in the pregnant person's blood  Can be done any time after 10  weeks gestation  Standard recommendation for NIPT screens for trisomy 21, trisomy 18, trisomy 13, with the option of adding sex chromosome aneuploidies, without or without predicted sex  Cannot screen for open neural tube defects, maternal serum AFP after 15 weeks is recommended  New NIPT options include screening for other trisomies, microdeletion syndromes, and in some cases fetal blood antigens. Guidelines do not recommend these conditions are included in standard screening. These options have limitations and should be discussed with a genetic counselor.     We discussed the following ultrasound options:    Nuchal translucency (NT) ultrasound  Ultrasound between [id number]-[id number] that includes nuchal translucency measurement and nasal bone assessments  Nuchal translucency refers to the space at the back of the neck where fluid builds up. All babies at this stage have fluid and there is only concern if there is too much fluid  Nasal bone refers to the small bone in the nose. There is concern for conditions like Down syndrome if the bone cannot be seen at all  This ultrasound can be done as part of first trimester screening, at the same time as another screen (NIPT), at the same time as a CVS, or if the patients does not want genetic screening.  Markers on ultrasound detects about 70% of pregnancies with aneuploidy  Abnormalities on NT ultrasound can also increase the risk for a birth defect, like a heart defect    Comprehensive level II ultrasound (Fetal Anatomy Ultrasound)  Ultrasound done between 18-20 weeks gestation  Screens for major birth defects and markers for aneuploidy (like trisomy 21 and trisomy 18)  Includes looking at the fetus/baby's growth, heart, organs (stomach, kidneys), placenta, and amniotic fluid    We discussed the following diagnostic options:     Chorionic villus sampling (CVS)  Invasive diagnostic procedure done between 10w0d and 13w6d  The procedure collects a small sample from the placenta  for the purpose of chromosomal testing and/or other genetic testing  Diagnostic result; more than 99% sensitivity for fetal chromosome abnormalities  Cannot screen for open neural tube defects, maternal serum AFP after 15 weeks is recommended    Amniocentesis  Invasive diagnostic procedure done after 15 weeks gestation  The procedure collects a small sample of amniotic fluid for the purpose of chromosomal testing and/or other genetic testing  Diagnostic result; more than 99% sensitivity for fetal chromosome abnormalities  Testing for AFP in the amniotic fluid can test for open neural tube defects    It was a pleasure to be involved with Gisela Cholotrinity chau [facility]. Face-to-face time of the meeting was 30 minutes.    Travis Vizcarra, HAYLEY, MS, LGC  Board Certified and [place] Licensed Genetic Counselor   [facility]  Maternal Fetal Medicine  Office: [phone]  [facility]: [phone]   Fax: [phone]  [facility]

## 2024-07-29 NOTE — NURSING NOTE
Gisela Moon is a  at 11w5d who presents to [facility] for an NT ultrasound and [facility] consult.  Pt denies bldg/lof/change in discharge, contractions, headache, vision changes, chest pain/SOB or edema. SBAR given to Dr. Soler, see note in Epic.      Nae Carreon RN

## 2024-07-29 NOTE — PROGRESS NOTES
Maternal-Fetal Medicine Consultation    Gisela Moon  : 2000  MRN: [id number]    REFERRAL:  Gisela Moon is a 24 year old sent by Dr. Alvarez for consultation regarding her prior history of  birth.    HPI:  Gisela Moon is a 24 year old  at 11w5d by 7w 2d ultrasound here for consultation regarding her history of  birth. Ms. Moon was noted to have a cervical length of 2.5 cm at 20 weeks gestation in her first pregnancy, followed by a cervical length of 2.6 cm at 22 weeks gestation. She was not started on progesterone in that pregnancy given her cervical length never fell below 2.5 cm. She subsequently had onset of  labor at 33w 1d, at which time she received tocolysis and a course of betamethasone and was discharged home. She subsequently had PPROM at 33w 4d, and underwent induction of labor at 34w 0d. Her son is 22 months and is doing well.    Obstetrics History:  OB History    Para Term  AB Living   4 1 0 1 2 1   SAB IAB Ectopic Multiple Live Births   2 0 0 0 1      # Outcome Date GA Lbr Donald/2nd Weight Sex Type Anes PTL Lv   4 Current            3 SAB 23     SAB      2  10/07/22 34w0d 07:45 / 00:29 1.99 kg (4 lb 6.2 oz) M Vag-Spont IV Y JENNIFER      Name: Rey Leyva      Apgar1: 7  Apgar5: 9   1 SAB 2021             Past Medical History:  Past Medical History:   Diagnosis Date    Infertility, female     Polycystic ovary syndrome      Past Surgical History:  No past surgical history on file.    Current Medications:  Prior to Admission medications    Medication Sig Last Dose Taking? Auth Provider Long Term End Date   polyethylene glycol (MIRALAX) 17 GM/Dose powder Take 1 Capful by mouth daily  Patient not taking: Reported on 2024   Reported, Patient     Prenatal Vit-Fe Fumarate-FA (PRENATAL PLUS) 27-1 MG TABS Take 1 tablet by mouth   Reported, Patient       Allergies:  Patient has no known allergies.    Social History:   Social History      Socioeconomic History    Marital status: Single     Spouse name: Not on file    Number of children: Not on file    Years of education: Not on file    Highest education level: Not on file   Occupational History    Not on file   Tobacco Use    Smoking status: Never     Passive exposure: Never    Smokeless tobacco: Never   Vaping Use    Vaping status: Never Used   Substance and Sexual Activity    Alcohol use: Never    Drug use: Never    Sexual activity: Yes     Partners: Male   Other Topics Concern    Parent/sibling w/ CABG, MI or angioplasty before 65F 55M? Yes     Comment: my dad.   Social History Narrative    Not on file     Social Determinants of Health     Financial Resource Strain: Low Risk  (6/9/2024)    Financial Resource Strain     Within the past 12 months, have you or your family members you live with been unable to get utilities (heat, electricity) when it was really needed?: No   Food Insecurity: Low Risk  (6/9/2024)    Food Insecurity     Within the past 12 months, did you worry that your food would run out before you got money to buy more?: No     Within the past 12 months, did the food you bought just not last and you didn t have money to get more?: No   Transportation Needs: Low Risk  (6/9/2024)    Transportation Needs     Within the past 12 months, has lack of transportation kept you from medical appointments, getting your medicines, non-medical meetings or appointments, work, or from getting things that you need?: No   Physical Activity: Insufficiently Active (6/9/2024)    Exercise Vital Sign     Days of Exercise per Week: 3 days     Minutes of Exercise per Session: 30 min   Stress: No Stress Concern Present (6/9/2024)    [employer] [employer] - Occupational Stress Questionnaire     Feeling of Stress : Only a little   Social Connections: Moderately Integrated (6/9/2024)    Social Connection and Isolation Panel [NHANES]     Frequency of Communication with Friends and Family:  Twice a week     Frequency of Social Gatherings with Friends and Family: Twice a week     Attends Faith Services: 1 to 4 times per year     Active Member of Clubs or Organizations: No     Attends Club or Organization Meetings: Patient declined     Marital Status:    Interpersonal Safety: Low Risk  (2024)    Interpersonal Safety     Do you feel physically and emotionally safe where you currently live?: Yes     Within the past 12 months, have you been hit, slapped, kicked or otherwise physically hurt by someone?: No     Within the past 12 months, have you been humiliated or emotionally abused in other ways by your partner or ex-partner?: No   Housing Stability: High Risk (2024)    Housing Stability     Do you have housing? : No     Are you worried about losing your housing?: No     Family History:  Family History   Problem Relation Age of Onset    Other - See Comments Mother         COVID    Heart Disease Father     Liver Disease Father     Polycystic ovary syndrome Sister     No Known Problems Brother     Hypertension Brother      ROS:  10-point ROS negative except as in HPI     PHYSICAL EXAM:  /70 (BP Location: Right arm, Patient Position: Semi-Zambrano's, Cuff Size: Adult Regular)   Pulse 65   Temp 98.6  F (37  C) (Oral)   Resp 16   LMP 2024 (Exact Date)   SpO2 99%   Gen: NAD, well appearing  Chest: Non-labored breathing    Genetic Testing:   Low risk cell free DNA screening    Ultrasounds:   Please see the imaging tab for details of the ultrasound performed today.    ASSESSMENT:  24 year old  at 11w5d by 7w 2d ultrasound here for consultation regarding her history of  birth.    Today we discussed the incidence and epidemiology of  birth (PTB).  There are multiple etiologies of spontaneous PTB, including but not limited to infection, bleeding, uterine distension, cervical insufficiency and stress.   However, most spontaneous  deliveries occur in  patients with no risk factors.  A history of prior  delivery is a significant risk factor for recurrence in a subsequent pregnancy.  Recurrent  delivery has been linked to maternal ethnicity, genitourinary infection, and especially gestational age at the first  delivery: the earlier the delivery, the greater the likelihood of recurrence.  We discussed that recurrent  birth can happen at the same gestational age as a prior  birth but that it cannot be predicted and it could recur at an earlier gestational age.  We also discussed the concept of cervical insufficiency which is another cause of  birth and is classically described as painless cervical dilation.      We discussed the increased  morbidity and mortality with prematurity which is the rationale for trying to prevent recurrent  birth.  We follow cervical lengths every two from 16 to 24 weeks in women with a history of PTB; an ultrasound indicated cerclage may be considered if shortening is detected prior to 23-24 weeks, given that all cases of cervical insufficiency may not fit into the classic patterns by history.    We discussed the data regarding the use of weekly intramuscular injections of 17 hydroxyprogesterone caproate (17-OHP) supplementation as a means to modify the risk of recurrent  birth.  A multicenter, double-blind randomized controlled trial found a 34% reduction in recurrent  birth <37 weeks (Agatha et al, 2003) with the use of 17-OHP .  This trial also found a significant reduction in recurrent  birth <35 and <32 weeks.  A more recent international, double-blind randomized controlled trial (PROLONG) found no reduction in the rate of recurrent  birth <35 weeks (Patti et al, 2019), calling into question the established use of 17-OHP. The FDA has now withdrawn all formulations of 17-OHP from the market, effective immediately. [facility] recommends that discussion of  the use of vaginal progesterone for primary prevention of recurrent PTB without input of cervical length or in those with a cervical length of 25 mm or greater include a shared decision-making process, especially if a progesterone formulation for PTB prevention was received in a prior pregnancy.    Recommendations:  Optimize modifiable risk factors: maintenance of adequate nutrition.  Baseline transvaginal cervical length at 16 weeks' gestation with measurements every 2 weeks until 24 weeks, increasing to weekly if the cervical length is between 25-29 mm.  Ultrasound-indicated cerclage versus vaginal progesterone should be considered if shortening <25 mm is diagnosed <24 weeks.  Administration of  corticosteroids if the patient is deemed to be at increased risk of imminent  delivery.  Urine culture every trimester with aggressive treatment of bacteriuria.  Clinical evaluation of  labor symptoms.       30 MINUTES SPENT BY ME on the date of service doing chart review, history, exam, documentation & further activities per the note.    Ximena Soler MD  Date of Service (when I saw the patient): 24

## 2024-08-06 ENCOUNTER — PRE VISIT (OUTPATIENT)
Dept: SURGERY | Facility: CLINIC | Age: 24
End: 2024-08-06

## 2024-08-06 NOTE — TELEPHONE ENCOUNTER
Diagnosis, Referred by & from: Anal Fissure, External Hemorrhoids   Appt date: 10/16/2024   NOTES STATUS DETAILS   OFFICE NOTE from referring provider Internal [facility]:  5/13/24 - PCC OV with Dr. Ruiz   OFFICE NOTE from other specialist Care Everywhere / Internal [facility]:  8/22/24, 7/18/24 - PCC OV with Dr. Iyer  6/21/24 - PCC OV with Dr. Alvarez  5/20/24 - PCC OV with Dr. Moon    [facility]:  7/29/24 - MATERNAL OV with Dr. Soler  7/29/24 - GENETIC OV with Travis Vizcarra GC  5/9/24 - PCC OV with Magda Granda NP    [facility]:  2/5/24 - CR OV with Nichol Edge NP     [facility]:  6/21/24 - PCC OV with Dr. Alvarez  1/10/24 - [facility]  7/[facility] - OBGYN OV with TONIA Maharaj    [facility]:  1/13/22 - OBGYN OV with Dr. Eastman   DISCHARGE SUMMARY from hospital N/A    DISCHARGE REPORT from the ER Internal [facility]:  1/21/24 - ED OV with Dr. Walker   OPERATIVE REPORT N/A    MEDICATION LIST Internal    LABS N/A    DIAGNOSTIC PROCEDURES N/A    IMAGING (DISC & REPORT) N/A

## 2024-08-22 ENCOUNTER — PRENATAL OFFICE VISIT (OUTPATIENT)
Dept: FAMILY MEDICINE | Facility: CLINIC | Age: 24
End: 2024-08-22
Payer: COMMERCIAL

## 2024-08-22 VITALS
DIASTOLIC BLOOD PRESSURE: 62 MMHG | WEIGHT: 123.7 LBS | TEMPERATURE: 98.2 F | BODY MASS INDEX: 24.29 KG/M2 | OXYGEN SATURATION: 100 % | SYSTOLIC BLOOD PRESSURE: 95 MMHG | RESPIRATION RATE: 16 BRPM | HEIGHT: 60 IN | HEART RATE: 71 BPM

## 2024-08-22 DIAGNOSIS — B37.31 YEAST VAGINITIS: Primary | ICD-10-CM

## 2024-08-22 DIAGNOSIS — Z87.51 HISTORY OF PRETERM DELIVERY: ICD-10-CM

## 2024-08-22 DIAGNOSIS — N89.8 VAGINAL DISCHARGE: ICD-10-CM

## 2024-08-22 DIAGNOSIS — Z34.90 PREGNANCY, UNSPECIFIED GESTATIONAL AGE: Primary | ICD-10-CM

## 2024-08-22 LAB
CLUE CELLS: ABNORMAL
TRICHOMONAS, WET PREP: ABNORMAL
WBC'S/HIGH POWER FIELD, WET PREP: ABNORMAL
YEAST, WET PREP: PRESENT

## 2024-08-22 PROCEDURE — 99207 PR PRENATAL VISIT: CPT | Performed by: FAMILY MEDICINE

## 2024-08-22 PROCEDURE — 99213 OFFICE O/P EST LOW 20 MIN: CPT | Mod: 25 | Performed by: FAMILY MEDICINE

## 2024-08-22 PROCEDURE — 87210 SMEAR WET MOUNT SALINE/INK: CPT | Performed by: FAMILY MEDICINE

## 2024-08-22 RX ORDER — MICONAZOLE NITRATE 2 %
1 CREAM WITH APPLICATOR VAGINAL AT BEDTIME
Qty: 45 G | Refills: 0 | Status: SHIPPED | OUTPATIENT
Start: 2024-08-22 | End: 2024-09-26

## 2024-08-22 NOTE — PROGRESS NOTES
"SUBJECTIVE:   at 15w1d. Estimated Date of Delivery: 2025.  She is doing well. She denies abdominal pain/cramping, vaginal bleeding, leakage of fluid. Not yet feeling fetal movement.  Concerns: ever since birth of her son, she has struggled with vaginal irritation. Skin is red and irritated on outside. Itchy. Intermittent symptoms.   ROS  Negative except as noted above in HPI.  OBJECTIVE:  Blood pressure 95/62, pulse 71, temperature 98.2  F (36.8  C), temperature source Oral, resp. rate 16, height 1.532 m (5' 0.32\"), weight 56.1 kg (123 lb 11.2 oz), last menstrual period 2024, SpO2 100%, not currently breastfeeding.  Gen: comfortable, no acute distress.  See OB Vitals flowsheet.  ASSESSMENT/PLAN:  Gisela was seen today for prenatal care.    Diagnoses and all orders for this visit:    Pregnancy, unspecified gestational age    History of  delivery: Has cervical length screening scheduled through [facility].    Vaginal discharge: Declines STI screening. Check wet prep. Advised to trial topical antifungal, such as monistat.  -     Wet prep - Clinic Collect      -IUP at 15w1d:   Prenatal labs reviewed   Reviewed MSAFP- declines.     RTC in 4 weeks or sooner with problems.        Cassie Iyer MD    "

## 2024-08-29 ENCOUNTER — ANCILLARY PROCEDURE (OUTPATIENT)
Dept: ULTRASOUND IMAGING | Facility: HOSPITAL | Age: 24
End: 2024-08-29
Attending: STUDENT IN AN ORGANIZED HEALTH CARE EDUCATION/TRAINING PROGRAM
Payer: COMMERCIAL

## 2024-08-29 ENCOUNTER — OFFICE VISIT (OUTPATIENT)
Dept: MATERNAL FETAL MEDICINE | Facility: HOSPITAL | Age: 24
End: 2024-08-29
Attending: STUDENT IN AN ORGANIZED HEALTH CARE EDUCATION/TRAINING PROGRAM
Payer: COMMERCIAL

## 2024-08-29 DIAGNOSIS — Z87.59 HISTORY OF PRETERM PREMATURE RUPTURE OF MEMBRANES (PPROM): Primary | ICD-10-CM

## 2024-08-29 DIAGNOSIS — Z87.59 HISTORY OF PRETERM PREMATURE RUPTURE OF MEMBRANES (PPROM): ICD-10-CM

## 2024-08-29 PROCEDURE — 99213 OFFICE O/P EST LOW 20 MIN: CPT | Mod: 25 | Performed by: STUDENT IN AN ORGANIZED HEALTH CARE EDUCATION/TRAINING PROGRAM

## 2024-08-29 PROCEDURE — 76817 TRANSVAGINAL US OBSTETRIC: CPT | Mod: 26 | Performed by: STUDENT IN AN ORGANIZED HEALTH CARE EDUCATION/TRAINING PROGRAM

## 2024-08-29 PROCEDURE — 76817 TRANSVAGINAL US OBSTETRIC: CPT

## 2024-08-29 PROCEDURE — G0463 HOSPITAL OUTPT CLINIC VISIT: HCPCS | Performed by: STUDENT IN AN ORGANIZED HEALTH CARE EDUCATION/TRAINING PROGRAM

## 2024-08-29 NOTE — NURSING NOTE
Gisela Moon presents to [facility] at 16w1d. Patient denies pain, denies contractions, leaking of fluid, or bleeding.Patient denies SOB/chest pain, edema, headache, visual changes, nausea/vomiting, epigastric pain related to preeclampsia. SBAR given to [facility] MD, see their note in Epic.      Joyce HEBERT RN 8/29/2024 11:58 AM

## 2024-09-11 ENCOUNTER — OFFICE VISIT (OUTPATIENT)
Dept: MATERNAL FETAL MEDICINE | Facility: HOSPITAL | Age: 24
End: 2024-09-11
Attending: OBSTETRICS & GYNECOLOGY
Payer: COMMERCIAL

## 2024-09-11 ENCOUNTER — ANCILLARY PROCEDURE (OUTPATIENT)
Dept: ULTRASOUND IMAGING | Facility: HOSPITAL | Age: 24
End: 2024-09-11
Attending: OBSTETRICS & GYNECOLOGY
Payer: COMMERCIAL

## 2024-09-11 DIAGNOSIS — Z87.59 HISTORY OF PRETERM PREMATURE RUPTURE OF MEMBRANES (PPROM): ICD-10-CM

## 2024-09-11 DIAGNOSIS — O09.892 HISTORY OF PRETERM DELIVERY, CURRENTLY PREGNANT IN SECOND TRIMESTER: Primary | ICD-10-CM

## 2024-09-11 PROCEDURE — 76811 OB US DETAILED SNGL FETUS: CPT | Mod: 26 | Performed by: OBSTETRICS & GYNECOLOGY

## 2024-09-11 PROCEDURE — 76817 TRANSVAGINAL US OBSTETRIC: CPT | Mod: 26 | Performed by: OBSTETRICS & GYNECOLOGY

## 2024-09-11 PROCEDURE — 76811 OB US DETAILED SNGL FETUS: CPT

## 2024-09-11 PROCEDURE — 99207 PR NO CHARGE LOS: CPT | Performed by: OBSTETRICS & GYNECOLOGY

## 2024-09-11 PROCEDURE — 76817 TRANSVAGINAL US OBSTETRIC: CPT

## 2024-09-11 NOTE — NURSING NOTE
Gisela Moon is a  at 18w0d who presents to [facility] for scheduled L2 and cervical length screening. SBAR given to Dr. Soler, see note in Epic.

## 2024-09-11 NOTE — PROGRESS NOTES
Please see the imaging tab for details of the ultrasound performed today.    Ximena Soler MD  Specialist in Maternal-Fetal Medicine

## 2024-09-17 ENCOUNTER — MYC MEDICAL ADVICE (OUTPATIENT)
Dept: FAMILY MEDICINE | Facility: CLINIC | Age: 24
End: 2024-09-17

## 2024-09-17 NOTE — TELEPHONE ENCOUNTER
Called patient, was able to schedule her in to see Dr. Alvarez today.       Dwayne Lopez, MSN, RN   [facility]

## 2024-09-24 ENCOUNTER — OFFICE VISIT (OUTPATIENT)
Dept: MATERNAL FETAL MEDICINE | Facility: HOSPITAL | Age: 24
End: 2024-09-24
Attending: STUDENT IN AN ORGANIZED HEALTH CARE EDUCATION/TRAINING PROGRAM
Payer: COMMERCIAL

## 2024-09-24 ENCOUNTER — ANCILLARY PROCEDURE (OUTPATIENT)
Dept: ULTRASOUND IMAGING | Facility: HOSPITAL | Age: 24
End: 2024-09-24
Attending: STUDENT IN AN ORGANIZED HEALTH CARE EDUCATION/TRAINING PROGRAM
Payer: COMMERCIAL

## 2024-09-24 DIAGNOSIS — Z87.59 HISTORY OF PRETERM PREMATURE RUPTURE OF MEMBRANES (PPROM): ICD-10-CM

## 2024-09-24 DIAGNOSIS — O09.892 HISTORY OF PRETERM DELIVERY, CURRENTLY PREGNANT IN SECOND TRIMESTER: Primary | ICD-10-CM

## 2024-09-24 PROCEDURE — 99207 PR NO CHARGE LOS: CPT | Performed by: STUDENT IN AN ORGANIZED HEALTH CARE EDUCATION/TRAINING PROGRAM

## 2024-09-24 PROCEDURE — 76817 TRANSVAGINAL US OBSTETRIC: CPT | Mod: 26 | Performed by: STUDENT IN AN ORGANIZED HEALTH CARE EDUCATION/TRAINING PROGRAM

## 2024-09-24 PROCEDURE — 76817 TRANSVAGINAL US OBSTETRIC: CPT

## 2024-09-24 NOTE — NURSING NOTE
Patient reports positive fetal movement, denies pain, denies contractions, leaking of fluid, or bleeding.  Education provided to patient on today's ultrasound.  SBAR given to BESSY GOLD, see their note in Epic.

## 2024-09-24 NOTE — PROGRESS NOTES
The patient was seen for an ultrasound in the [facility] today.  For a detailed report of the ultrasound examination, please see the ultrasound report which can be found under the imaging tab.     If you have questions regarding today's evaluation or if we can be of further service, please contact the Maternal-Fetal Medicine Center.    Deepika Lujan MD  Maternal Fetal Medicine

## 2024-09-26 ENCOUNTER — PRENATAL OFFICE VISIT (OUTPATIENT)
Dept: FAMILY MEDICINE | Facility: CLINIC | Age: 24
End: 2024-09-26
Payer: COMMERCIAL

## 2024-09-26 VITALS
TEMPERATURE: 98.3 F | RESPIRATION RATE: 12 BRPM | DIASTOLIC BLOOD PRESSURE: 56 MMHG | BODY MASS INDEX: 24.94 KG/M2 | HEART RATE: 76 BPM | WEIGHT: 127 LBS | SYSTOLIC BLOOD PRESSURE: 102 MMHG | OXYGEN SATURATION: 99 % | HEIGHT: 60 IN

## 2024-09-26 DIAGNOSIS — Z87.51 HISTORY OF PRETERM DELIVERY: Primary | ICD-10-CM

## 2024-09-26 DIAGNOSIS — B37.31 YEAST VAGINITIS: ICD-10-CM

## 2024-09-26 PROCEDURE — 99207 PR PRENATAL VISIT: CPT | Performed by: FAMILY MEDICINE

## 2024-09-26 RX ORDER — MICONAZOLE NITRATE 2 %
1 CREAM WITH APPLICATOR VAGINAL AT BEDTIME
Qty: 45 G | Refills: 1 | Status: SHIPPED | OUTPATIENT
Start: 2024-09-26

## 2024-09-26 NOTE — PROGRESS NOTES
"SUBJECTIVE:   at 20w1d. Estimated Date of Delivery: 2025.  She is doing well. She denies nausea and vomiting. Fetal movement is present  Concerns: she reports feeling like she still has yeast infection. She did complete treatment but reports she did not apply the yeast cream intravaginally, and only used it on the outside of her vagina. She also reports symptoms seem to recur after her pelvic US for cervical length due to history of prematurity.   ROS  Negative except as noted above in HPI.  OBJECTIVE:  Blood pressure 102/56, pulse 76, temperature 98.3  F (36.8  C), temperature source Oral, resp. rate 12, height 1.532 m (5' 0.32\"), weight 57.6 kg (127 lb), last menstrual period 2024, SpO2 99%, not currently breastfeeding.  Gen: comfortable, no acute distress.  See OB Vitals flowsheet.  ASSESSMENT/PLAN:  Gisela was seen today for prenatal care.    Diagnoses and all orders for this visit:    History of  delivery: Continue cervical length checks through [facility]. No cervical shortening.    Yeast vaginitis: Incomplete treatment- advised to use intravaginally- reviewed how to use. If no improvement after 7 days, extend to 14 day course.   -     miconazole (MICONAZOLE 7) 2 % cream; Place 1 applicator vaginally at bedtime.      -IUP at 20w1d:   Prenatal labs reviewed   RTC in 4 weeks or sooner with problems. 1 hour GCT next visit        Cassie Iyer MD    "

## 2024-10-05 ENCOUNTER — HEALTH MAINTENANCE LETTER (OUTPATIENT)
Age: 24
End: 2024-10-05

## 2024-10-11 ENCOUNTER — OFFICE VISIT (OUTPATIENT)
Dept: MATERNAL FETAL MEDICINE | Facility: HOSPITAL | Age: 24
End: 2024-10-11
Attending: OBSTETRICS & GYNECOLOGY
Payer: COMMERCIAL

## 2024-10-11 ENCOUNTER — ANCILLARY PROCEDURE (OUTPATIENT)
Dept: ULTRASOUND IMAGING | Facility: HOSPITAL | Age: 24
End: 2024-10-11
Attending: OBSTETRICS & GYNECOLOGY
Payer: COMMERCIAL

## 2024-10-11 DIAGNOSIS — O26.879 CERVICAL SHORTENING AFFECTING PREGNANCY: Primary | ICD-10-CM

## 2024-10-11 DIAGNOSIS — Z87.59 HISTORY OF PRETERM PREMATURE RUPTURE OF MEMBRANES (PPROM): ICD-10-CM

## 2024-10-11 DIAGNOSIS — O09.892 HISTORY OF PRETERM DELIVERY, CURRENTLY PREGNANT IN SECOND TRIMESTER: Primary | ICD-10-CM

## 2024-10-11 PROCEDURE — 76817 TRANSVAGINAL US OBSTETRIC: CPT

## 2024-10-11 PROCEDURE — 76817 TRANSVAGINAL US OBSTETRIC: CPT | Mod: 26 | Performed by: STUDENT IN AN ORGANIZED HEALTH CARE EDUCATION/TRAINING PROGRAM

## 2024-10-11 PROCEDURE — 76816 OB US FOLLOW-UP PER FETUS: CPT | Mod: 26 | Performed by: STUDENT IN AN ORGANIZED HEALTH CARE EDUCATION/TRAINING PROGRAM

## 2024-10-11 PROCEDURE — 99214 OFFICE O/P EST MOD 30 MIN: CPT | Mod: 25 | Performed by: STUDENT IN AN ORGANIZED HEALTH CARE EDUCATION/TRAINING PROGRAM

## 2024-10-11 NOTE — PROGRESS NOTES
The patient was seen for an ultrasound in the [facility] today.  For a detailed report of the ultrasound examination, please see the ultrasound report which can be found under the imaging tab.     If you have questions regarding today's evaluation or if we can be of further service, please contact the Maternal-Fetal Medicine Center.    Deepika Lujan MD  Maternal Fetal Medicine

## 2024-10-11 NOTE — NURSING NOTE
Gisela Moon is a  at 22w2d who presents to [facility] for a follow-up growth ultrasound and transvaginal ultrasound for cervical length. Pt reports positive fetal movement. Pt denies bldg/lof/change in discharge, contractions, headache, vision changes, chest pain/SOB or edema. SBAR given to Dr. ELAINE Lujan, see note in Epic.      Nae Carreon RN

## 2024-10-14 ENCOUNTER — TELEPHONE (OUTPATIENT)
Dept: FAMILY MEDICINE | Facility: CLINIC | Age: 24
End: 2024-10-14
Payer: COMMERCIAL

## 2024-10-14 DIAGNOSIS — O26.879 CERVICAL SHORTENING AFFECTING PREGNANCY: ICD-10-CM

## 2024-10-14 NOTE — TELEPHONE ENCOUNTER
Medication Question or Refill    What medication are you calling about (include dose and sig)?: progesterone 200 MG VA SUPP     Preferred Pharmacy:  [facility] - [place], [place] - [address]  [address] [facility] [address]  Phone: [phone] Fax: [phone]    Patient offered an appointment? No    Do you have any questions or concerns?  Yes: Pt was unable to  medication from [facility] as she was informed they do not carry the medication. Pt requesting prescription be sent to [facility].     Could we send this information to you in Poptipt or would you prefer to receive a phone call?:   Patient would prefer a phone call   Okay to leave a detailed message?: Yes at Cell number on file:    Telephone Information:   Mobile [phone]

## 2024-10-16 ENCOUNTER — HOSPITAL ENCOUNTER (OUTPATIENT)
Dept: ULTRASOUND IMAGING | Facility: CLINIC | Age: 24
Discharge: HOME | End: 2024-10-16
Attending: STUDENT IN AN ORGANIZED HEALTH CARE EDUCATION/TRAINING PROGRAM
Payer: COMMERCIAL

## 2024-10-16 ENCOUNTER — OFFICE VISIT (OUTPATIENT)
Dept: MATERNAL FETAL MEDICINE | Facility: CLINIC | Age: 24
End: 2024-10-16
Attending: STUDENT IN AN ORGANIZED HEALTH CARE EDUCATION/TRAINING PROGRAM
Payer: COMMERCIAL

## 2024-10-16 ENCOUNTER — PRE VISIT (OUTPATIENT)
Dept: SURGERY | Facility: CLINIC | Age: 24
End: 2024-10-16

## 2024-10-16 DIAGNOSIS — O09.892 HISTORY OF PRETERM DELIVERY, CURRENTLY PREGNANT IN SECOND TRIMESTER: ICD-10-CM

## 2024-10-16 DIAGNOSIS — O09.892 HISTORY OF PRETERM DELIVERY, CURRENTLY PREGNANT IN SECOND TRIMESTER: Primary | ICD-10-CM

## 2024-10-16 PROCEDURE — 76817 TRANSVAGINAL US OBSTETRIC: CPT

## 2024-10-16 PROCEDURE — 76817 TRANSVAGINAL US OBSTETRIC: CPT | Mod: 26 | Performed by: STUDENT IN AN ORGANIZED HEALTH CARE EDUCATION/TRAINING PROGRAM

## 2024-10-16 NOTE — NURSING NOTE
Patient presents to [facility] for TV US at 23w0d due to history of  delivery and short cervix noted this pregnancy. Positive fetal movement. Denies LOF, vaginal bleeding or cramping/contractions. SBAR given to [facility] MD, see their note in Epic.

## 2024-10-24 ENCOUNTER — PRENATAL OFFICE VISIT (OUTPATIENT)
Dept: FAMILY MEDICINE | Facility: CLINIC | Age: 24
End: 2024-10-24
Payer: COMMERCIAL

## 2024-10-24 VITALS
RESPIRATION RATE: 12 BRPM | BODY MASS INDEX: 25.82 KG/M2 | HEART RATE: 80 BPM | HEIGHT: 60 IN | TEMPERATURE: 97.5 F | OXYGEN SATURATION: 100 % | WEIGHT: 131.5 LBS | SYSTOLIC BLOOD PRESSURE: 97 MMHG | DIASTOLIC BLOOD PRESSURE: 66 MMHG

## 2024-10-24 DIAGNOSIS — D64.9 ANEMIA, UNSPECIFIED TYPE: ICD-10-CM

## 2024-10-24 DIAGNOSIS — Z34.90 PREGNANCY, UNSPECIFIED GESTATIONAL AGE: Primary | ICD-10-CM

## 2024-10-24 DIAGNOSIS — Z87.51 HISTORY OF PRETERM DELIVERY: ICD-10-CM

## 2024-10-24 LAB
GLUCOSE 1H P 50 G GLC PO SERPL-MCNC: 90 MG/DL (ref 70–129)
HGB BLD-MCNC: 9.5 G/DL (ref 11.7–15.7)
T PALLIDUM AB SER QL: NONREACTIVE

## 2024-10-24 PROCEDURE — 85018 HEMOGLOBIN: CPT | Performed by: FAMILY MEDICINE

## 2024-10-24 PROCEDURE — 86780 TREPONEMA PALLIDUM: CPT | Performed by: FAMILY MEDICINE

## 2024-10-24 PROCEDURE — 83540 ASSAY OF IRON: CPT | Performed by: FAMILY MEDICINE

## 2024-10-24 PROCEDURE — 83550 IRON BINDING TEST: CPT | Performed by: FAMILY MEDICINE

## 2024-10-24 PROCEDURE — 36415 COLL VENOUS BLD VENIPUNCTURE: CPT | Performed by: FAMILY MEDICINE

## 2024-10-24 PROCEDURE — 99207 PR PRENATAL VISIT: CPT | Performed by: FAMILY MEDICINE

## 2024-10-24 PROCEDURE — 82728 ASSAY OF FERRITIN: CPT | Performed by: FAMILY MEDICINE

## 2024-10-24 PROCEDURE — 82950 GLUCOSE TEST: CPT | Performed by: FAMILY MEDICINE

## 2024-10-24 RX ORDER — FERROUS SULFATE 325(65) MG
325 TABLET ORAL
Qty: 90 TABLET | Refills: 2 | Status: SHIPPED | OUTPATIENT
Start: 2024-10-24

## 2024-10-24 NOTE — PROGRESS NOTES
"SUBJECTIVE:   at 24w1d. Estimated Date of Delivery: 2025.  She is doing well. She denies nausea and vomiting. She denies abdominal pain/cramping, vaginal bleeding, leakage of fluid. Fetal movement is present.   Concerns: has questions about topical skin products and safety in pregnancy. Questions whether she can have intercourse using the vaginal progesterone suppositories.   ROS  Negative except as noted above in HPI.  OBJECTIVE:  Blood pressure 97/66, pulse 80, temperature 97.5  F (36.4  C), temperature source Oral, resp. rate 12, height 1.532 m (5' 0.32\"), weight 59.6 kg (131 lb 8 oz), last menstrual period 2024, SpO2 100%, not currently breastfeeding.  Gen: comfortable, no acute distress.  See OB Vitals flowsheet.  ASSESSMENT/PLAN:  Gisela was seen today for prenatal care.    Diagnoses and all orders for this visit:    Pregnancy, unspecified gestational age  -     Glucose tolerance, gest screen, 1 hour; Future  -     Treponema Abs w Reflex to RPR and Titer; Future  -     Hemoglobin; Future  -     Glucose tolerance, gest screen, 1 hour  -     Treponema Abs w Reflex to RPR and Titer  -     Hemoglobin    History of  delivery: On vaginal progesterone- last cervical length US through [facility] was improved at 32.6 mm (previously was 23.9 mm)- no further cervical length ultrasounds needed due to improvement and gestational age. Counseled on  warning signs/when to seek care. I reviewed the evidence does not show any need for pelvic rest/avoiding intercourse, however, if she does have frequent cramping/contractions post-intercourse, it would also be reasonable to remain abstinent for duration of pregnant if desired.      -IUP at 24w1d:   Prenatal labs reviewed   RTC in 4 weeks or sooner with problems      Cassie Iyer MD    "

## 2024-10-25 LAB
FERRITIN SERPL-MCNC: 10 NG/ML (ref 6–175)
IRON BINDING CAPACITY (ROCHE): 384 UG/DL (ref 240–430)
IRON SATN MFR SERPL: 12 % (ref 15–46)
IRON SERPL-MCNC: 47 UG/DL (ref 37–145)

## 2024-10-27 ENCOUNTER — NURSE TRIAGE (OUTPATIENT)
Dept: FAMILY MEDICINE | Facility: CLINIC | Age: 24
End: 2024-10-27
Payer: COMMERCIAL

## 2024-10-28 NOTE — TELEPHONE ENCOUNTER
Tyson thurman concerns started last a couple of weeks ago, only when she is actively walking. Not currently having them.     When she does have them, in an hour, she'll have about 3-4 tyson thurman.       Dwayne Lopez, MSN, RN   [facility]

## 2024-10-29 ENCOUNTER — NURSE TRIAGE (OUTPATIENT)
Dept: FAMILY MEDICINE | Facility: CLINIC | Age: 24
End: 2024-10-29
Payer: COMMERCIAL

## 2024-10-29 ENCOUNTER — HOSPITAL ENCOUNTER (OUTPATIENT)
Facility: HOSPITAL | Age: 24
Discharge: HOME | End: 2024-10-29
Attending: FAMILY MEDICINE | Admitting: FAMILY MEDICINE
Payer: COMMERCIAL

## 2024-10-29 ENCOUNTER — HOSPITAL ENCOUNTER (OUTPATIENT)
Facility: HOSPITAL | Age: 24
End: 2024-10-29
Admitting: FAMILY MEDICINE
Payer: COMMERCIAL

## 2024-10-29 VITALS
SYSTOLIC BLOOD PRESSURE: 106 MMHG | RESPIRATION RATE: 14 BRPM | DIASTOLIC BLOOD PRESSURE: 65 MMHG | HEART RATE: 75 BPM | TEMPERATURE: 98 F

## 2024-10-29 PROBLEM — Z36.89 ENCOUNTER FOR TRIAGE IN PREGNANT PATIENT: Status: ACTIVE | Noted: 2024-10-29

## 2024-10-29 PROCEDURE — G0463 HOSPITAL OUTPT CLINIC VISIT: HCPCS

## 2024-10-29 RX ORDER — LIDOCAINE 40 MG/G
CREAM TOPICAL
Status: DISCONTINUED | OUTPATIENT
Start: 2024-10-29 | End: 2024-10-29 | Stop reason: HOSPADM

## 2024-10-29 ASSESSMENT — ACTIVITIES OF DAILY LIVING (ADL): ADLS_ACUITY_SCORE: 0

## 2024-10-29 NOTE — PROGRESS NOTES
Patient arrived ambulatory from home. States that she talked to clinic who advised her to come in to be seen. Feels cramping with bowel movements. Does not have constipation or hard stools. Not straining to go at all. States that cramps do not continue after bowel movement and only during. Sees MD next month. Positive fetal movements felt and seen while adjusting EFM. Dr Iyer called and updated on patient status. States that patient seams concerned about shortening cervix. Dr Iyer states that per her records the recent MFM US shows reassuring cervical length. MD ordered Doptone and to be discharged home and keep apt for next month unless needed soon.  Dischrge instructions gone through with patient, verbalized understanding questions and concerns answered.

## 2024-10-29 NOTE — TELEPHONE ENCOUNTER
Dr. Alvarez - Please review and advise on disposition/alternate recommendations.    Nurse Triage SBAR    Is this a 2nd Level Triage? NO    Situation: Loose stools with associated pelvic cramping x1 week.    Background:   Pregnant, 24w6d.  Hx PTL/PTD  On vaginal progesterone - shortened cervix    Assessment:   Endorses loose stools, not watery. Type 5 or 6 on [place] Stool chart.  5/10 pelvic cramping with urge to have BM, resolves completely after BM.  Notes increased gas/bloating.  Patient reports 1 loose stool in last 24 hours (usually 1-2 BM in morning).  Urinating regularly, last urinated a few hours ago.  Denies recent travel, diet changes, antibiotics.  Denies blood in stool, pus/mucus.  Denies vaginal bleeding/spotting/discharge.  No fever, no nausea/vomiting.  Drinking adequate fluids.    Protocol Recommended Disposition:   See in Office Within 3 Days    Recommendation:   Routing to PCP/covering provider for recommendation per patient request--does patient NTBS or monitor from home/home cares?  Continue drinking adequate fluids for hydration  Call back if new/worsening symptoms    Routed to provider    Does the patient meet one of the following criteria for ADS visit consideration? 16+ years old, with an [facility] PCP     TIP  Providers, please consider if this condition is appropriate for management at one of our Acute and Diagnostic Services sites.     If patient is a good candidate, please use dotphrase <dot>triageresponse and select Refer to ADS to document.     Reason for Disposition   MILD diarrhea (e.g., 1-3 or more stools than normal in past 24 hours) diarrhea without known cause and present > 7 days    Additional Information   Negative: Shock suspected (e.g., cold/pale/clammy skin, too weak to stand, low BP, rapid pulse)   Negative: Difficult to awaken or acting confused (e.g., disoriented, slurred speech)   Negative: Sounds like a life-threatening emergency to the triager   Negative: Vomiting also  "present and worse than the diarrhea   Negative: Blood in stool and without diarrhea   Negative: SEVERE abdominal pain (e.g., excruciating) and present > 1 hour   Negative: SEVERE abdominal pain and age > 60 years   Negative: Bloody, black, or tarry bowel movements  (Exception: Chronic-unchanged black-grey bowel movements and is taking iron pills or Pepto-Bismol.)   Negative: SEVERE diarrhea (e.g., 7 or more times / day more than normal) and age > 60 years   Negative: Constant abdominal pain lasting > 2 hours   Negative: Drinking very little and dehydration suspected (e.g., no urine > 12 hours, very dry mouth, very lightheaded)   Negative: Patient sounds very sick or weak to the triager   Negative: SEVERE diarrhea (e.g., 7 or more times / day more than normal) and present > 24 hours (1 day)   Negative: MODERATE diarrhea (e.g., 4-6 times / day more than normal) and present > 48 hours (2 days)   Negative: MODERATE diarrhea (e.g., 4-6 times / day more than normal) and age > 70 years   Negative: Abdominal pain (Exception: Pain clears completely with each passage of diarrhea stool.)   Negative: Fever > 101 F (38.3 C)   Negative: Blood in the stool  (Exception: Only on toilet paper. Reason: Diarrhea can cause rectal irritation with blood on wiping.)   Negative: Mucus or pus in stool has been present > 2 days and diarrhea is more than mild   Negative: Weak immune system (e.g., HIV positive, cancer chemo, splenectomy, organ transplant, chronic steroids)   Negative: Travel to a foreign country in past month   Negative: Recent antibiotic therapy (i.e., within last 2 months) and diarrhea present > 3 days since antibiotic was stopped   Negative: Recent hospitalization and diarrhea present > 3 days   Negative: Tube feedings (e.g., nasogastric, g-tube, j-tube)    Answer Assessment - Initial Assessment Questions  1. DIARRHEA SEVERITY: \"How bad is the diarrhea?\" \"How many more stools have you had in the past 24 hours than normal?\" " "    - NO DIARRHEA (SCALE 0)    - MILD (SCALE 1-3): Few loose or mushy BMs; increase of 1-3 stools over normal daily number of stools; mild increase in ostomy output.    -  MODERATE (SCALE 4-7): Increase of 4-6 stools daily over normal; moderate increase in ostomy output.    -  SEVERE (SCALE 8-10; OR \"WORST POSSIBLE\"): Increase of 7 or more stools daily over normal; moderate increase in ostomy output; incontinence.      Loose stools 1x   2. ONSET: \"When did the diarrhea begin?\"       1 week ago  3. BM CONSISTENCY: \"How loose or watery is the diarrhea?\"       Formed, but loose  4. VOMITING: \"Are you also vomiting?\" If Yes, ask: \"How many times in the past 24 hours?\"       Denies  5. ABDOMEN PAIN: \"Are you having any abdomen pain?\" If Yes, ask: \"What does it feel like?\" (e.g., crampy, dull, intermittent, constant)       *No Answer*  6. ABDOMEN PAIN SEVERITY: If present, ask: \"How bad is the pain?\"  (e.g., Scale 1-10; mild, moderate, or severe)    - MILD (1-3): doesn't interfere with normal activities, abdomen soft and not tender to touch     - MODERATE (4-7): interferes with normal activities or awakens from sleep, abdomen tender to touch     - SEVERE (8-10): excruciating pain, doubled over, unable to do any normal activities        Pelvic cramping with stools  7. ORAL INTAKE: If vomiting, \"Have you been able to drink liquids?\" \"How much liquids have you had in the past 24 hours?\"      *No Answer*  8. HYDRATION: \"Any signs of dehydration?\" (e.g., dry mouth [not just dry lips], too weak to stand, dizziness, new weight loss) \"When did you last urinate?\"      Denies. Urinated hours ago.  9. EXPOSURE: \"Have you traveled to a foreign country recently?\" \"Have you been exposed to anyone with diarrhea?\" \"Could you have eaten any food that was spoiled?\"      No  10. ANTIBIOTIC USE: \"Are you taking antibiotics now or have you taken antibiotics in the past 2 months?\"        No  11. OTHER SYMPTOMS: \"Do you have any other " "symptoms?\" (e.g., fever, blood in stool)        *No Answer*  12. PREGNANCY: \"Is there any chance you are pregnant?\" \"When was your last menstrual period?\"        Pregnant    Protocols used: Diarrhea-A-OH    "

## 2024-10-29 NOTE — TELEPHONE ENCOUNTER
Called patient, relayed Dr. Alvarez's message. Patient verbalizes understanding and will go to [facility].       Dwayne Lopez, MSN, RN   [facility]

## 2024-11-06 ENCOUNTER — MYC MEDICAL ADVICE (OUTPATIENT)
Dept: FAMILY MEDICINE | Facility: CLINIC | Age: 24
End: 2024-11-06
Payer: COMMERCIAL

## 2024-11-06 NOTE — TELEPHONE ENCOUNTER
Writer responded to patient message via [employer].     Dwayne Lopez, MSN, RN   [facility]

## 2024-11-21 ENCOUNTER — PRENATAL OFFICE VISIT (OUTPATIENT)
Dept: FAMILY MEDICINE | Facility: CLINIC | Age: 24
End: 2024-11-21
Payer: COMMERCIAL

## 2024-11-21 VITALS
BODY MASS INDEX: 26.76 KG/M2 | RESPIRATION RATE: 12 BRPM | SYSTOLIC BLOOD PRESSURE: 99 MMHG | TEMPERATURE: 97.9 F | DIASTOLIC BLOOD PRESSURE: 65 MMHG | WEIGHT: 136.31 LBS | HEART RATE: 78 BPM | OXYGEN SATURATION: 99 % | HEIGHT: 60 IN

## 2024-11-21 DIAGNOSIS — Z87.59 HISTORY OF PRETERM PREMATURE RUPTURE OF MEMBRANES (PPROM): ICD-10-CM

## 2024-11-21 DIAGNOSIS — Z34.90 PREGNANCY, UNSPECIFIED GESTATIONAL AGE: Primary | ICD-10-CM

## 2024-11-21 PROBLEM — Z36.89 ENCOUNTER FOR TRIAGE IN PREGNANT PATIENT: Status: RESOLVED | Noted: 2024-10-29 | Resolved: 2024-11-21

## 2024-11-21 NOTE — PROGRESS NOTES
"SUBJECTIVE:   at 28w1d. Estimated Date of Delivery: 2025.  She is doing well. She denies vaginal bleeding, leakage of fluid, or urinary symptoms. Fetal movement is present. She is not having contractions.  Concerns: having some uterine tightening, non-painful, at night. No increase in intensity. No painful, regular contractions. Continues to use progesterone at bedtime- she has noticed some irritation of vaginal related to use of pantiliners at bedtime.   ROS  Negative except as noted above in HPI  OBJECTIVE:  Blood pressure 99/65, pulse 78, temperature 97.9  F (36.6  C), temperature source Oral, resp. rate 12, height 1.532 m (5' 0.32\"), weight 61.8 kg (136 lb 5 oz), last menstrual period 2024, SpO2 99%, not currently breastfeeding.  Gen: Comfortable, no acute distress.  Abd: Gravid, non-tender  See OB Vitals flowsheet.  Bedside US: cephalic  ASSESSMENT/PLAN:  Gisela was seen today for prenatal care.    Diagnoses and all orders for this visit:    Pregnancy, unspecified gestational age    History of  premature rupture of membranes (PPROM)      -IUP at 28w1d:   Prenatal labs reviewed   Reviewed symptoms/signs of  labor  Discussed Tdap- declines today but considering for future visit  Anemia- taking oral iron for 1 month. Declines recheck today- plan for next visit  RTC in 2 weeks or sooner with problems. Repeat hemoglobin, offer Tdap        Cassie Iyer MD    "

## 2024-11-27 ENCOUNTER — OFFICE VISIT (OUTPATIENT)
Dept: SURGERY | Facility: CLINIC | Age: 24
End: 2024-11-27
Attending: EMERGENCY MEDICINE
Payer: COMMERCIAL

## 2024-11-27 VITALS
HEIGHT: 60 IN | SYSTOLIC BLOOD PRESSURE: 103 MMHG | OXYGEN SATURATION: 98 % | HEART RATE: 92 BPM | BODY MASS INDEX: 26.78 KG/M2 | DIASTOLIC BLOOD PRESSURE: 71 MMHG | WEIGHT: 136.4 LBS

## 2024-11-27 DIAGNOSIS — K64.4 EXTERNAL HEMORRHOIDS: ICD-10-CM

## 2024-11-27 DIAGNOSIS — K60.2 ANAL FISSURE: ICD-10-CM

## 2024-11-27 ASSESSMENT — PAIN SCALES - GENERAL: PAINLEVEL_OUTOF10: NO PAIN (0)

## 2024-11-27 NOTE — LETTER
2024       RE: Gisela Moon  [address] [place] [address]     Dear Colleague,    Thank you for referring your patient, Gisela Moon, to the [facility] [place] [facility]. Please see a copy of my visit note below.    Colon and Rectal Surgery Follow-Up Clinic Note    RE: Gisela Moon  : 2000  IMER: 2024    Gisela Moon is a very pleasant 24 year old female here for follow-up of anal fissure.    Interval history:   I saw Gisela in February of this year with an anal fissure.  She reports that she no longer has any sharp pain but still some discomfort and occasional bright red blood with bowel movements.  Stools have been very soft.  No prolapsing tissue.  She is currently 29 weeks pregnant.    Physical Examination: Exam was chaperoned by Erinn Kebede EMT   /71 (BP Location: Left arm, Patient Position: Sitting, Cuff Size: Adult Regular)   Pulse 92   Ht 5'   Wt 136 lb 6.4 oz   LMP 2024 (Exact Date)   SpO2 98%   BMI 26.64 kg/m    General: Alert, oriented, in no acute distress, sitting comfortably  HEENT: Mucous membranes moist    Perianal external examination:  Perianal skin: Intact with no excoriation or lichenification.  Lesions: No evidence of an external lesion, nodularity, or induration in the perianal region.  Eversion of buttocks: There was evidence of an anal fissure. Details: anterior midline anal fissure.  Skin tags or external hemorrhoids: None.    Digital rectal examination: Was deferred in order not to cause further trauma.    Anoscopy: Was deferred in order not to cause further trauma.    Assessment/Plan: 24 year old female with anal fissure.  She is currently 29 weeks pregnant so I would like to do another course of topical diltiazem.  Continue to keep bowel movements soft.  If she fails to heal despite topical management, could consider Botox injections postpartum.  Patient's questions were answered to her stated satisfaction and she is in agreement with this plan.     Medical history:  Past Medical History:   Diagnosis Date     Infertility, female      Polycystic ovary syndrome        Surgical history:  No past surgical history on file.    Problem list:    Patient Active Problem List    Diagnosis Date Noted     History of  delivery 10/14/2022     Priority: Medium     Shortened cervix        History of  premature rupture of membranes (PPROM) 10/14/2022     Priority: Medium     PCOS (polycystic ovarian syndrome) 2020     Priority: Medium       Medications:  Current Outpatient Medications   Medication Sig Dispense Refill     diltiazem 2% 2% OINT ointment Apply pea sized amount to external anal opening three times a day for 8 weeks 60 g 3     ferrous sulfate (FEROSUL) 325 (65 Fe) MG tablet Take 1 tablet (325 mg) by mouth daily (with breakfast). 90 tablet 2     Prenatal Vit-Fe Fumarate-FA (PRENATAL PLUS) 27-1 MG TABS Take 1 tablet by mouth       polyethylene glycol (MIRALAX) 17 GM/Dose powder Take 1 Capful by mouth daily. (Patient not taking: Reported on 10/24/2024)       progesterone 200 MG VA SUPP Place 1 suppository (200 mg) vaginally at bedtime. (Patient not taking: Reported on 2024) 30 suppository 4       Allergies:  No Known Allergies    Family history:  Family History   Problem Relation Age of Onset     Other - See Comments Mother         COVID     Heart Disease Father      Liver Disease Father      Polycystic ovary syndrome Sister      No Known Problems Brother      Hypertension Brother        Social history:  Social History     Tobacco Use     Smoking status: Never     Passive exposure: Never     Smokeless tobacco: Never   Substance Use Topics     Alcohol use: Never     Marital status: single.    Nursing Notes:   Erinn Kebede  2024  2:39 PM  Signed  Chief Complaint   Patient presents with     Rectal Problem     hemorrhoids       Vitals:     11/27/24 1437   BP: 103/71   BP Location: Left arm   Patient Position: Sitting   Cuff Size: Adult Regular   Pulse: 92   SpO2: 98%   Weight: 61.9 kg (136 lb 6.4 oz)   Height: 1.524 m (5')       Body mass index is 26.64 kg/m .    DALI Burrell    15 minutes spent on the date of encounter performing chart review, history and exam, documentation and further activities as noted above     VINCENT Olivia  Colon and Rectal Surgery  [facility]      Again, thank you for allowing me to participate in the care of your patient.      Sincerely,    FILIPPO Olivia CNP

## 2024-11-27 NOTE — PROGRESS NOTES
Colon and Rectal Surgery Follow-Up Clinic Note    RE: Gisela Moon  : 2000  IMER: 2024    Gisela Moon is a very pleasant 24 year old female here for follow-up of anal fissure.    Interval history:   I saw Gisela in February of this year with an anal fissure.  She reports that she no longer has any sharp pain but still some discomfort and occasional bright red blood with bowel movements.  Stools have been very soft.  No prolapsing tissue.  She is currently 29 weeks pregnant.    Physical Examination: Exam was chaperoned by Erinn Kebede EMT   /71 (BP Location: Left arm, Patient Position: Sitting, Cuff Size: Adult Regular)   Pulse 92   Ht 5'   Wt 136 lb 6.4 oz   LMP 2024 (Exact Date)   SpO2 98%   BMI 26.64 kg/m    General: Alert, oriented, in no acute distress, sitting comfortably  HEENT: Mucous membranes moist    Perianal external examination:  Perianal skin: Intact with no excoriation or lichenification.  Lesions: No evidence of an external lesion, nodularity, or induration in the perianal region.  Eversion of buttocks: There was evidence of an anal fissure. Details: anterior midline anal fissure.  Skin tags or external hemorrhoids: None.    Digital rectal examination: Was deferred in order not to cause further trauma.    Anoscopy: Was deferred in order not to cause further trauma.    Assessment/Plan: 24 year old female with anal fissure.  She is currently 29 weeks pregnant so I would like to do another course of topical diltiazem.  Continue to keep bowel movements soft.  If she fails to heal despite topical management, could consider Botox injections postpartum. Patient's questions were answered to her stated satisfaction and she is in agreement with this plan.     Medical history:  Past Medical History:   Diagnosis Date    Infertility, female     Polycystic ovary syndrome        Surgical history:  No past surgical history on file.    Problem list:    Patient Active Problem List     Diagnosis Date Noted    History of  delivery 10/14/2022     Priority: Medium     Shortened cervix       History of  premature rupture of membranes (PPROM) 10/14/2022     Priority: Medium    PCOS (polycystic ovarian syndrome) 2020     Priority: Medium       Medications:  Current Outpatient Medications   Medication Sig Dispense Refill    diltiazem 2% 2% OINT ointment Apply pea sized amount to external anal opening three times a day for 8 weeks 60 g 3    ferrous sulfate (FEROSUL) 325 (65 Fe) MG tablet Take 1 tablet (325 mg) by mouth daily (with breakfast). 90 tablet 2    Prenatal Vit-Fe Fumarate-FA (PRENATAL PLUS) 27-1 MG TABS Take 1 tablet by mouth      polyethylene glycol (MIRALAX) 17 GM/Dose powder Take 1 Capful by mouth daily. (Patient not taking: Reported on 10/24/2024)      progesterone 200 MG VA SUPP Place 1 suppository (200 mg) vaginally at bedtime. (Patient not taking: Reported on 2024) 30 suppository 4       Allergies:  No Known Allergies    Family history:  Family History   Problem Relation Age of Onset    Other - See Comments Mother         COVID    Heart Disease Father     Liver Disease Father     Polycystic ovary syndrome Sister     No Known Problems Brother     Hypertension Brother        Social history:  Social History     Tobacco Use    Smoking status: Never     Passive exposure: Never    Smokeless tobacco: Never   Substance Use Topics    Alcohol use: Never     Marital status: single.    Nursing Notes:   Erinn Kebede  2024  2:39 PM  Signed  Chief Complaint   Patient presents with    Rectal Problem     hemorrhoids       Vitals:    24 1437   BP: 103/71   BP Location: Left arm   Patient Position: Sitting   Cuff Size: Adult Regular   Pulse: 92   SpO2: 98%   Weight: 61.9 kg (136 lb 6.4 oz)   Height: 1.524 m (5')       Body mass index is 26.64 kg/m .    Erinn Kebede, EMT    15 minutes spent on the date of encounter performing chart review, history and exam, documentation  and further activities as noted above     Nichol Hassan NP-C  Colon and Rectal Surgery  [facility]

## 2024-11-27 NOTE — NURSING NOTE
Chief Complaint   Patient presents with    Rectal Problem     hemorrhoids       Vitals:    11/27/24 1437   BP: 103/71   BP Location: Left arm   Patient Position: Sitting   Cuff Size: Adult Regular   Pulse: 92   SpO2: 98%   Weight: 61.9 kg (136 lb 6.4 oz)   Height: 1.524 m (5')       Body mass index is 26.64 kg/m .    Erinn Kebede EMT

## 2024-12-16 DIAGNOSIS — K60.2 ANAL FISSURE: Primary | ICD-10-CM

## 2024-12-18 DIAGNOSIS — D64.9 ANEMIA, UNSPECIFIED TYPE: Primary | ICD-10-CM

## 2024-12-19 ENCOUNTER — PRENATAL OFFICE VISIT (OUTPATIENT)
Dept: FAMILY MEDICINE | Facility: CLINIC | Age: 24
End: 2024-12-19
Payer: COMMERCIAL

## 2024-12-19 VITALS
OXYGEN SATURATION: 97 % | DIASTOLIC BLOOD PRESSURE: 60 MMHG | SYSTOLIC BLOOD PRESSURE: 90 MMHG | HEIGHT: 60 IN | WEIGHT: 144.4 LBS | HEART RATE: 81 BPM | RESPIRATION RATE: 16 BRPM | TEMPERATURE: 98.3 F | BODY MASS INDEX: 28.35 KG/M2

## 2024-12-19 DIAGNOSIS — D64.9 ANEMIA, UNSPECIFIED TYPE: ICD-10-CM

## 2024-12-19 LAB — HGB BLD-MCNC: 9.6 G/DL (ref 11.7–15.7)

## 2024-12-19 NOTE — PROGRESS NOTES
SUBJECTIVE:   at 32w1d. Estimated Date of Delivery: 2025.  She is doing well. She denies vaginal bleeding, leakage of fluid, or urinary symptoms. Fetal movement is present. She is not having contractions.  Concerns: having irregular tightening but no painful or regular contractions. She had colorectal consult for anal fissue and was prescribed diltiazem ointment but it caused pruritus, so she stopped.  ROS  Negative except as noted above in HPI  OBJECTIVE:  Blood pressure 90/60, pulse 81, temperature 98.3  F (36.8  C), temperature source Oral, resp. rate 16, height 1.524 m (5'), weight 65.5 kg (144 lb 6.4 oz), last menstrual period 2024, SpO2 97%, not currently breastfeeding.  Gen: Comfortable, no acute distress.  Abd: Gravid, non-tender  See OB Vitals flowsheet.  ASSESSMENT/PLAN:  Gisela was seen today for prenatal care.    Diagnoses and all orders for this visit:    Anemia, unspecified type: Taking oral iron every other day. Recheck labs.  -     Hemoglobin    Other orders  -     TDAP 7+ (ADACEL,BOOSTRIX)      -IUP at 32w1d:   Prenatal labs reviewed   Reviewed   RTC in 2 weeks or sooner with problems. Offer RSV next visit        Cassie Iyer MD

## 2024-12-19 NOTE — PROGRESS NOTES
Prior to immunization administration, verified patients identity using patient s name and date of birth. Please see Immunization Activity for additional information.     Screening Questionnaire for Adult Immunization    Are you sick today?   No   Do you have allergies to medications, food, a vaccine component or latex?   No   Have you ever had a serious reaction after receiving a vaccination?   No   Do you have a long-term health problem with heart, lung, kidney, or metabolic disease (e.g., diabetes), asthma, a blood disorder, no spleen, complement component deficiency, a cochlear implant, or a spinal fluid leak?  Are you on long-term aspirin therapy?   No   Do you have cancer, leukemia, HIV/AIDS, or any other immune system problem?   No   Do you have a parent, brother, or sister with an immune system problem?   No   In the past 3 months, have you taken medications that affect  your immune system, such as prednisone, other steroids, or anticancer drugs; drugs for the treatment of rheumatoid arthritis, Crohn s disease, or psoriasis; or have you had radiation treatments?   No   Have you had a seizure, or a brain or other nervous system problem?   No   During the past year, have you received a transfusion of blood or blood    products, or been given immune (gamma) globulin or antiviral drug?   No   For women: Are you pregnant or is there a chance you could become       pregnant during the next month?   Preg now        Have you received any vaccinations in the past 4 weeks?   No     Immunization questionnaire was positive for at least one answer.  Notified Dr Iyer .      Patient instructed to remain in clinic for 15 minutes afterwards, and to report any adverse reactions.     Screening performed by Aida Gil MA on 12/19/2024 at 12:52 PM.

## 2024-12-20 PROBLEM — O99.019 ANEMIA AFFECTING PREGNANCY: Status: ACTIVE | Noted: 2024-12-20

## 2024-12-20 PROBLEM — D50.9 ANEMIA, IRON DEFICIENCY: Status: ACTIVE | Noted: 2024-12-20

## 2024-12-23 DIAGNOSIS — K62.89 ANAL PAIN: Primary | ICD-10-CM

## 2024-12-23 RX ORDER — LIDOCAINE HYDROCHLORIDE 40 MG/ML
SOLUTION TOPICAL 4 TIMES DAILY PRN
Qty: 50 ML | Refills: 1 | Status: SHIPPED | OUTPATIENT
Start: 2024-12-23

## 2025-01-02 ENCOUNTER — PRENATAL OFFICE VISIT (OUTPATIENT)
Dept: FAMILY MEDICINE | Facility: CLINIC | Age: 25
End: 2025-01-02

## 2025-01-02 VITALS
TEMPERATURE: 98.1 F | DIASTOLIC BLOOD PRESSURE: 67 MMHG | RESPIRATION RATE: 12 BRPM | BODY MASS INDEX: 27.79 KG/M2 | SYSTOLIC BLOOD PRESSURE: 100 MMHG | OXYGEN SATURATION: 99 % | HEART RATE: 78 BPM | HEIGHT: 60 IN | WEIGHT: 141.56 LBS

## 2025-01-02 DIAGNOSIS — Z87.51 HISTORY OF PRETERM DELIVERY: ICD-10-CM

## 2025-01-02 DIAGNOSIS — O99.013 ANEMIA AFFECTING PREGNANCY IN THIRD TRIMESTER: ICD-10-CM

## 2025-01-02 DIAGNOSIS — O26.879 CERVICAL SHORTENING AFFECTING PREGNANCY: ICD-10-CM

## 2025-01-02 DIAGNOSIS — Z34.90 PREGNANCY, UNSPECIFIED GESTATIONAL AGE: Primary | ICD-10-CM

## 2025-01-02 NOTE — PROGRESS NOTES
"SUBJECTIVE:   at 34w1d. Estimated Date of Delivery: 2025.  She is doing well. She denies vaginal bleeding, leakage of fluid, or urinary symptoms. Fetal movement is present. She is not having contractions.  Concerns: having some intermittent pelvic pressure. Does not feel like baby has dropped yet. Has questions about possible water birth.  ROS  Negative except as noted above in HPI  OBJECTIVE:  Blood pressure 100/67, pulse 78, temperature 98.1  F (36.7  C), temperature source Oral, resp. rate 12, height 1.532 m (5' 0.32\"), weight 64.2 kg (141 lb 9 oz), last menstrual period 2024, SpO2 99%, not currently breastfeeding.  Gen: Comfortable, no acute distress.  Abd: Gravid, non-tender  See OB Vitals flowsheet.  ASSESSMENT/PLAN:  Gisela was seen today for prenatal care.    Diagnoses and all orders for this visit:    Pregnancy, unspecified gestational age    Cervical shortening affecting pregnancy/History of  delivery: Refilled and continue vaginal progesterone. Reviewed  warning signs.  -     progesterone 200 MG VA SUPP; Place 1 suppository (200 mg) vaginally at bedtime.    Anemia affecting pregnancy in third trimester: Has not yet started iron infusions but these are set up now.      -IUP at 34w1d:   Prenatal labs reviewed   [facility] hospital #  Reviewed skin to skin.  Reviewed baby meds in hospital and 24 hour testing, delayed cord clamping.    Discussed RSV vaccine- declines today but considering next visit  GBS, RSV vaccine next visit, bedside US        Cassie Iyer MD    "

## 2025-01-14 ENCOUNTER — INFUSION THERAPY VISIT (OUTPATIENT)
Dept: INFUSION THERAPY | Facility: HOSPITAL | Age: 25
End: 2025-01-14
Payer: COMMERCIAL

## 2025-01-14 VITALS
SYSTOLIC BLOOD PRESSURE: 109 MMHG | RESPIRATION RATE: 18 BRPM | OXYGEN SATURATION: 99 % | DIASTOLIC BLOOD PRESSURE: 62 MMHG | HEART RATE: 84 BPM | TEMPERATURE: 98.2 F

## 2025-01-14 DIAGNOSIS — O99.013 ANEMIA AFFECTING PREGNANCY IN THIRD TRIMESTER: ICD-10-CM

## 2025-01-14 DIAGNOSIS — D50.9 IRON DEFICIENCY ANEMIA, UNSPECIFIED IRON DEFICIENCY ANEMIA TYPE: Primary | ICD-10-CM

## 2025-01-14 PROCEDURE — 258N000003 HC RX IP 258 OP 636: Performed by: FAMILY MEDICINE

## 2025-01-14 PROCEDURE — 96374 THER/PROPH/DIAG INJ IV PUSH: CPT

## 2025-01-14 PROCEDURE — 250N000011 HC RX IP 250 OP 636: Performed by: FAMILY MEDICINE

## 2025-01-14 RX ORDER — EPINEPHRINE 1 MG/ML
0.3 INJECTION, SOLUTION INTRAMUSCULAR; SUBCUTANEOUS EVERY 5 MIN PRN
Status: CANCELLED | OUTPATIENT
Start: 2025-01-16

## 2025-01-14 RX ORDER — DIPHENHYDRAMINE HYDROCHLORIDE 50 MG/ML
50 INJECTION INTRAMUSCULAR; INTRAVENOUS
Status: DISCONTINUED | OUTPATIENT
Start: 2025-01-14 | End: 2025-01-14 | Stop reason: HOSPADM

## 2025-01-14 RX ORDER — DIPHENHYDRAMINE HYDROCHLORIDE 50 MG/ML
25 INJECTION INTRAMUSCULAR; INTRAVENOUS
Status: CANCELLED
Start: 2025-01-16

## 2025-01-14 RX ORDER — MEPERIDINE HYDROCHLORIDE 25 MG/ML
25 INJECTION INTRAMUSCULAR; INTRAVENOUS; SUBCUTANEOUS
Status: DISCONTINUED | OUTPATIENT
Start: 2025-01-14 | End: 2025-01-14 | Stop reason: HOSPADM

## 2025-01-14 RX ORDER — ALBUTEROL SULFATE 90 UG/1
1-2 INHALANT RESPIRATORY (INHALATION)
Status: DISCONTINUED | OUTPATIENT
Start: 2025-01-14 | End: 2025-01-14 | Stop reason: HOSPADM

## 2025-01-14 RX ORDER — METHYLPREDNISOLONE SODIUM SUCCINATE 40 MG/ML
40 INJECTION INTRAMUSCULAR; INTRAVENOUS
Status: CANCELLED
Start: 2025-01-16

## 2025-01-14 RX ORDER — ALBUTEROL SULFATE 0.83 MG/ML
2.5 SOLUTION RESPIRATORY (INHALATION)
Status: CANCELLED | OUTPATIENT
Start: 2025-01-16

## 2025-01-14 RX ORDER — ALBUTEROL SULFATE 0.83 MG/ML
2.5 SOLUTION RESPIRATORY (INHALATION)
Status: DISCONTINUED | OUTPATIENT
Start: 2025-01-14 | End: 2025-01-14 | Stop reason: HOSPADM

## 2025-01-14 RX ORDER — EPINEPHRINE 1 MG/ML
0.3 INJECTION, SOLUTION INTRAMUSCULAR; SUBCUTANEOUS EVERY 5 MIN PRN
Status: DISCONTINUED | OUTPATIENT
Start: 2025-01-14 | End: 2025-01-14 | Stop reason: HOSPADM

## 2025-01-14 RX ORDER — METHYLPREDNISOLONE SODIUM SUCCINATE 40 MG/ML
40 INJECTION INTRAMUSCULAR; INTRAVENOUS
Status: DISCONTINUED | OUTPATIENT
Start: 2025-01-14 | End: 2025-01-14 | Stop reason: HOSPADM

## 2025-01-14 RX ORDER — HEPARIN SODIUM,PORCINE 10 UNIT/ML
5-20 VIAL (ML) INTRAVENOUS DAILY PRN
Status: CANCELLED | OUTPATIENT
Start: 2025-01-16

## 2025-01-14 RX ORDER — MEPERIDINE HYDROCHLORIDE 25 MG/ML
25 INJECTION INTRAMUSCULAR; INTRAVENOUS; SUBCUTANEOUS
Status: CANCELLED | OUTPATIENT
Start: 2025-01-16

## 2025-01-14 RX ORDER — DIPHENHYDRAMINE HYDROCHLORIDE 50 MG/ML
25 INJECTION INTRAMUSCULAR; INTRAVENOUS
Status: DISCONTINUED | OUTPATIENT
Start: 2025-01-14 | End: 2025-01-14 | Stop reason: HOSPADM

## 2025-01-14 RX ORDER — DIPHENHYDRAMINE HYDROCHLORIDE 50 MG/ML
50 INJECTION INTRAMUSCULAR; INTRAVENOUS
Status: CANCELLED
Start: 2025-01-16

## 2025-01-14 RX ORDER — HEPARIN SODIUM (PORCINE) LOCK FLUSH IV SOLN 100 UNIT/ML 100 UNIT/ML
5 SOLUTION INTRAVENOUS
Status: CANCELLED | OUTPATIENT
Start: 2025-01-16

## 2025-01-14 RX ORDER — ALBUTEROL SULFATE 90 UG/1
1-2 INHALANT RESPIRATORY (INHALATION)
Status: CANCELLED
Start: 2025-01-16

## 2025-01-14 RX ADMIN — IRON SUCROSE 200 MG: 20 INJECTION, SOLUTION INTRAVENOUS at 14:24

## 2025-01-14 RX ADMIN — SODIUM CHLORIDE 250 ML: 9 INJECTION, SOLUTION INTRAVENOUS at 14:23

## 2025-01-14 ASSESSMENT — PAIN SCALES - GENERAL: PAINLEVEL_OUTOF10: NO PAIN (0)

## 2025-01-14 NOTE — PROGRESS NOTES
Infusion Nursing Note:  Gisela Moon presents today for Venofer 200 mg.    Patient seen by provider today: No   present during visit today: Not Applicable.    Note: Gisela arrived into the infusion center ambulatory in a stable condition for Venofer 200 my, VSS. Plan of care reviewed, she stated she has not had Iron infusion before. Patient education on Iron infusion including signs and symptoms of reaction reviewed with her, she verbalized understanding of her plan of careand return to clinic    Intravenous Access:  Peripheral IV placed.    Treatment Conditions:  Not Applicable.    Post Infusion Assessment:  Patient tolerated infusion without incident.  Patient observed for 30 minutes post Iron infusion  per protocol.  Site patent and intact, free from redness, edema or discomfort.  No evidence of extravasations.  Access discontinued per protocol.     Discharge Plan:   Discharge instructions reviewed with: Patient.  Patient and/or family verbalized understanding of discharge instructions and all questions answered.  Patient discharged in stable condition accompanied by: self.  Departure Mode: Ambulatory.    Alberta Marie RN

## 2025-01-16 ENCOUNTER — INFUSION THERAPY VISIT (OUTPATIENT)
Dept: INFUSION THERAPY | Facility: HOSPITAL | Age: 25
End: 2025-01-16
Payer: COMMERCIAL

## 2025-01-16 ENCOUNTER — PRENATAL OFFICE VISIT (OUTPATIENT)
Dept: FAMILY MEDICINE | Facility: CLINIC | Age: 25
End: 2025-01-16
Payer: COMMERCIAL

## 2025-01-16 VITALS
TEMPERATURE: 98.1 F | DIASTOLIC BLOOD PRESSURE: 63 MMHG | RESPIRATION RATE: 16 BRPM | OXYGEN SATURATION: 100 % | HEART RATE: 84 BPM | SYSTOLIC BLOOD PRESSURE: 102 MMHG

## 2025-01-16 VITALS
OXYGEN SATURATION: 99 % | DIASTOLIC BLOOD PRESSURE: 66 MMHG | TEMPERATURE: 97.7 F | SYSTOLIC BLOOD PRESSURE: 98 MMHG | RESPIRATION RATE: 12 BRPM | HEIGHT: 60 IN | WEIGHT: 145.5 LBS | HEART RATE: 100 BPM | BODY MASS INDEX: 28.57 KG/M2

## 2025-01-16 DIAGNOSIS — Z34.90 PREGNANCY, UNSPECIFIED GESTATIONAL AGE: Primary | ICD-10-CM

## 2025-01-16 DIAGNOSIS — D50.9 IRON DEFICIENCY ANEMIA, UNSPECIFIED IRON DEFICIENCY ANEMIA TYPE: Primary | ICD-10-CM

## 2025-01-16 DIAGNOSIS — O99.013 ANEMIA AFFECTING PREGNANCY IN THIRD TRIMESTER: ICD-10-CM

## 2025-01-16 PROCEDURE — 250N000011 HC RX IP 250 OP 636: Performed by: FAMILY MEDICINE

## 2025-01-16 PROCEDURE — 258N000003 HC RX IP 258 OP 636: Performed by: FAMILY MEDICINE

## 2025-01-16 RX ORDER — DIPHENHYDRAMINE HYDROCHLORIDE 50 MG/ML
25 INJECTION INTRAMUSCULAR; INTRAVENOUS
Start: 2025-01-18

## 2025-01-16 RX ORDER — DIPHENHYDRAMINE HYDROCHLORIDE 50 MG/ML
50 INJECTION INTRAMUSCULAR; INTRAVENOUS
Status: DISCONTINUED | OUTPATIENT
Start: 2025-01-16 | End: 2025-01-16 | Stop reason: HOSPADM

## 2025-01-16 RX ORDER — DIPHENHYDRAMINE HYDROCHLORIDE 50 MG/ML
50 INJECTION INTRAMUSCULAR; INTRAVENOUS
Start: 2025-01-18

## 2025-01-16 RX ORDER — MEPERIDINE HYDROCHLORIDE 25 MG/ML
25 INJECTION INTRAMUSCULAR; INTRAVENOUS; SUBCUTANEOUS
OUTPATIENT
Start: 2025-01-18

## 2025-01-16 RX ORDER — METHYLPREDNISOLONE SODIUM SUCCINATE 40 MG/ML
40 INJECTION INTRAMUSCULAR; INTRAVENOUS
Start: 2025-01-18

## 2025-01-16 RX ORDER — ALBUTEROL SULFATE 0.83 MG/ML
2.5 SOLUTION RESPIRATORY (INHALATION)
OUTPATIENT
Start: 2025-01-18

## 2025-01-16 RX ORDER — METHYLPREDNISOLONE SODIUM SUCCINATE 40 MG/ML
40 INJECTION INTRAMUSCULAR; INTRAVENOUS
Status: DISCONTINUED | OUTPATIENT
Start: 2025-01-16 | End: 2025-01-16 | Stop reason: HOSPADM

## 2025-01-16 RX ORDER — MEPERIDINE HYDROCHLORIDE 25 MG/ML
25 INJECTION INTRAMUSCULAR; INTRAVENOUS; SUBCUTANEOUS
Status: DISCONTINUED | OUTPATIENT
Start: 2025-01-16 | End: 2025-01-16 | Stop reason: HOSPADM

## 2025-01-16 RX ORDER — EPINEPHRINE 1 MG/ML
0.3 INJECTION, SOLUTION INTRAMUSCULAR; SUBCUTANEOUS EVERY 5 MIN PRN
OUTPATIENT
Start: 2025-01-18

## 2025-01-16 RX ORDER — HEPARIN SODIUM (PORCINE) LOCK FLUSH IV SOLN 100 UNIT/ML 100 UNIT/ML
5 SOLUTION INTRAVENOUS
OUTPATIENT
Start: 2025-01-18

## 2025-01-16 RX ORDER — ALBUTEROL SULFATE 90 UG/1
1-2 INHALANT RESPIRATORY (INHALATION)
Status: DISCONTINUED | OUTPATIENT
Start: 2025-01-16 | End: 2025-01-16 | Stop reason: HOSPADM

## 2025-01-16 RX ORDER — DIPHENHYDRAMINE HYDROCHLORIDE 50 MG/ML
25 INJECTION INTRAMUSCULAR; INTRAVENOUS
Status: DISCONTINUED | OUTPATIENT
Start: 2025-01-16 | End: 2025-01-16 | Stop reason: HOSPADM

## 2025-01-16 RX ORDER — EPINEPHRINE 1 MG/ML
0.3 INJECTION, SOLUTION INTRAMUSCULAR; SUBCUTANEOUS EVERY 5 MIN PRN
Status: DISCONTINUED | OUTPATIENT
Start: 2025-01-16 | End: 2025-01-16 | Stop reason: HOSPADM

## 2025-01-16 RX ORDER — HEPARIN SODIUM,PORCINE 10 UNIT/ML
5-20 VIAL (ML) INTRAVENOUS DAILY PRN
OUTPATIENT
Start: 2025-01-18

## 2025-01-16 RX ORDER — ALBUTEROL SULFATE 0.83 MG/ML
2.5 SOLUTION RESPIRATORY (INHALATION)
Status: DISCONTINUED | OUTPATIENT
Start: 2025-01-16 | End: 2025-01-16 | Stop reason: HOSPADM

## 2025-01-16 RX ORDER — ALBUTEROL SULFATE 90 UG/1
1-2 INHALANT RESPIRATORY (INHALATION)
Start: 2025-01-18

## 2025-01-16 RX ADMIN — IRON SUCROSE 200 MG: 20 INJECTION, SOLUTION INTRAVENOUS at 14:17

## 2025-01-16 RX ADMIN — SODIUM CHLORIDE 250 ML: 9 INJECTION, SOLUTION INTRAVENOUS at 14:17

## 2025-01-16 ASSESSMENT — PAIN SCALES - GENERAL: PAINLEVEL_OUTOF10: NO PAIN (0)

## 2025-01-16 NOTE — PROGRESS NOTES
"SUBJECTIVE:   at 36w1d. Estimated Date of Delivery: 2025.  She is doing well. She denies vaginal bleeding, leakage of fluid, or urinary symptoms. Fetal movement is present. She is not having painful or regular contractions.  Concerns: has 4-5 contractions per day. Feeling more uncomfortable and difficulty sleeping. Having pain in ribs.     ROS  Negative except as noted above in HPI  OBJECTIVE:  Blood pressure 98/66, pulse 100, temperature 97.7  F (36.5  C), temperature source Oral, resp. rate 12, height 1.532 m (5' 0.32\"), weight 66 kg (145 lb 8 oz), last menstrual period 2024, SpO2 99%, not currently breastfeeding.  Gen: Comfortable, no acute distress.  Abd: Gravid, non-tender  See OB Vitals flowsheet.  ASSESSMENT/PLAN:  Gisela was seen today for prenatal care.    Diagnoses and all orders for this visit:    Pregnancy, unspecified gestational age  -     Group B strep PCR      -IUP at 36w1d:   Prenatal labs reviewed.   Has # to call for L&D- reviewed when to go to hospital  RTC in 1 weeks or sooner with problems.      Cassie Iyer MD    "

## 2025-01-16 NOTE — PROGRESS NOTES
Infusion Nursing Note:  Gisela Moon presents today for Venofer.    Patient seen by provider today: No   present during visit today: Not Applicable.    Note: Gisela arrived into the infusion center ambulatory in a stable condition but looking tired, she stated she came from another appointment with her OBGYN. Plan of care was reviewed with Gisela, she verbalized understanding of her plan of care and return to clinic. IV placed, as the NS is going in before the Iron could reach her, she complained of not feeling well, vital signs checked an her BP has dropped  please see flowsheet, NS was given then PB came back to her baseline. Iron was administered per order which she tolerated well. She states  it was because she rushed in from another appointment and had not eaten lunch. She felt better and left ambulatory in a stable condition      Intravenous Access:  Peripheral IV placed.    Treatment Conditions:  Not Applicable.      Post Infusion Assessment:  Patient tolerated infusion without incident.  Site patent and intact, free from redness, edema or discomfort.  No evidence of extravasations.  Access discontinued per protocol.       Discharge Plan:   Discharge instructions reviewed with: Patient.  Patient and/or family verbalized understanding of discharge instructions and all questions answered.  Patient discharged in stable condition accompanied by: self.  Departure Mode: Ambulatory.      Alberta Marie RN

## 2025-01-20 ENCOUNTER — INFUSION THERAPY VISIT (OUTPATIENT)
Dept: INFUSION THERAPY | Facility: HOSPITAL | Age: 25
End: 2025-01-20
Payer: COMMERCIAL

## 2025-01-20 VITALS
DIASTOLIC BLOOD PRESSURE: 61 MMHG | TEMPERATURE: 98.1 F | OXYGEN SATURATION: 100 % | RESPIRATION RATE: 16 BRPM | SYSTOLIC BLOOD PRESSURE: 97 MMHG | HEART RATE: 82 BPM

## 2025-01-20 DIAGNOSIS — D50.9 IRON DEFICIENCY ANEMIA, UNSPECIFIED IRON DEFICIENCY ANEMIA TYPE: Primary | ICD-10-CM

## 2025-01-20 DIAGNOSIS — O99.013 ANEMIA AFFECTING PREGNANCY IN THIRD TRIMESTER: ICD-10-CM

## 2025-01-20 PROCEDURE — 250N000011 HC RX IP 250 OP 636: Performed by: FAMILY MEDICINE

## 2025-01-20 PROCEDURE — 258N000003 HC RX IP 258 OP 636: Performed by: FAMILY MEDICINE

## 2025-01-20 PROCEDURE — 96374 THER/PROPH/DIAG INJ IV PUSH: CPT

## 2025-01-20 RX ORDER — ALBUTEROL SULFATE 90 UG/1
1-2 INHALANT RESPIRATORY (INHALATION)
Status: CANCELLED
Start: 2025-01-22

## 2025-01-20 RX ORDER — ALBUTEROL SULFATE 0.83 MG/ML
2.5 SOLUTION RESPIRATORY (INHALATION)
Status: DISCONTINUED | OUTPATIENT
Start: 2025-01-20 | End: 2025-01-20 | Stop reason: HOSPADM

## 2025-01-20 RX ORDER — METHYLPREDNISOLONE SODIUM SUCCINATE 40 MG/ML
40 INJECTION INTRAMUSCULAR; INTRAVENOUS
Status: CANCELLED
Start: 2025-01-22

## 2025-01-20 RX ORDER — DIPHENHYDRAMINE HYDROCHLORIDE 50 MG/ML
50 INJECTION INTRAMUSCULAR; INTRAVENOUS
Status: DISCONTINUED | OUTPATIENT
Start: 2025-01-20 | End: 2025-01-20 | Stop reason: HOSPADM

## 2025-01-20 RX ORDER — DIPHENHYDRAMINE HYDROCHLORIDE 50 MG/ML
25 INJECTION INTRAMUSCULAR; INTRAVENOUS
Status: DISCONTINUED | OUTPATIENT
Start: 2025-01-20 | End: 2025-01-20 | Stop reason: HOSPADM

## 2025-01-20 RX ORDER — MEPERIDINE HYDROCHLORIDE 25 MG/ML
25 INJECTION INTRAMUSCULAR; INTRAVENOUS; SUBCUTANEOUS
Status: DISCONTINUED | OUTPATIENT
Start: 2025-01-20 | End: 2025-01-20 | Stop reason: HOSPADM

## 2025-01-20 RX ORDER — HEPARIN SODIUM (PORCINE) LOCK FLUSH IV SOLN 100 UNIT/ML 100 UNIT/ML
5 SOLUTION INTRAVENOUS
Status: CANCELLED | OUTPATIENT
Start: 2025-01-22

## 2025-01-20 RX ORDER — DIPHENHYDRAMINE HYDROCHLORIDE 50 MG/ML
50 INJECTION INTRAMUSCULAR; INTRAVENOUS
Status: CANCELLED
Start: 2025-01-22

## 2025-01-20 RX ORDER — DIPHENHYDRAMINE HYDROCHLORIDE 50 MG/ML
25 INJECTION INTRAMUSCULAR; INTRAVENOUS
Status: CANCELLED
Start: 2025-01-22

## 2025-01-20 RX ORDER — METHYLPREDNISOLONE SODIUM SUCCINATE 40 MG/ML
40 INJECTION INTRAMUSCULAR; INTRAVENOUS
Status: DISCONTINUED | OUTPATIENT
Start: 2025-01-20 | End: 2025-01-20 | Stop reason: HOSPADM

## 2025-01-20 RX ORDER — EPINEPHRINE 1 MG/ML
0.3 INJECTION, SOLUTION INTRAMUSCULAR; SUBCUTANEOUS EVERY 5 MIN PRN
Status: CANCELLED | OUTPATIENT
Start: 2025-01-22

## 2025-01-20 RX ORDER — ALBUTEROL SULFATE 0.83 MG/ML
2.5 SOLUTION RESPIRATORY (INHALATION)
Status: CANCELLED | OUTPATIENT
Start: 2025-01-22

## 2025-01-20 RX ORDER — ALBUTEROL SULFATE 90 UG/1
1-2 INHALANT RESPIRATORY (INHALATION)
Status: DISCONTINUED | OUTPATIENT
Start: 2025-01-20 | End: 2025-01-20 | Stop reason: HOSPADM

## 2025-01-20 RX ORDER — HEPARIN SODIUM,PORCINE 10 UNIT/ML
5-20 VIAL (ML) INTRAVENOUS DAILY PRN
Status: CANCELLED | OUTPATIENT
Start: 2025-01-22

## 2025-01-20 RX ORDER — EPINEPHRINE 1 MG/ML
0.3 INJECTION, SOLUTION INTRAMUSCULAR; SUBCUTANEOUS EVERY 5 MIN PRN
Status: DISCONTINUED | OUTPATIENT
Start: 2025-01-20 | End: 2025-01-20 | Stop reason: HOSPADM

## 2025-01-20 RX ORDER — MEPERIDINE HYDROCHLORIDE 25 MG/ML
25 INJECTION INTRAMUSCULAR; INTRAVENOUS; SUBCUTANEOUS
Status: CANCELLED | OUTPATIENT
Start: 2025-01-22

## 2025-01-20 RX ADMIN — SODIUM CHLORIDE 250 ML: 9 INJECTION, SOLUTION INTRAVENOUS at 14:37

## 2025-01-20 RX ADMIN — IRON SUCROSE 200 MG: 20 INJECTION, SOLUTION INTRAVENOUS at 14:37

## 2025-01-20 ASSESSMENT — PAIN SCALES - GENERAL: PAINLEVEL_OUTOF10: NO PAIN (0)

## 2025-01-20 NOTE — PROGRESS NOTES
Infusion Nursing Note:  Gisela Moon presents today for Venofer 200 mg.    Patient seen by provider today: No   present during visit today: Not Applicable.    Note: Gisela arrived into the infusion center ambulatory in a stable condition for Venofer 200 my, VSS. Plan of care reviewed, she verbalized understanding of her plan of care and return to clinic. She stated she has tolerated Iron so far    Intravenous Access:  Peripheral IV placed.    Treatment Conditions:  Not Applicable.    Post Infusion Assessment:  Patient tolerated infusion without incident.  Patient observed for 30 minutes post Iron infusion  per protocol.  Site patent and intact, free from redness, edema or discomfort.  No evidence of extravasations.  Access discontinued per protocol.     Discharge Plan:   Discharge instructions reviewed with: Patient.  Patient and/or family verbalized understanding of discharge instructions and all questions answered.  Patient discharged in stable condition accompanied by: self.  Departure Mode: Ambulatory.    Alberta Marie RN

## 2025-01-22 ENCOUNTER — INFUSION THERAPY VISIT (OUTPATIENT)
Dept: INFUSION THERAPY | Facility: HOSPITAL | Age: 25
End: 2025-01-22
Payer: COMMERCIAL

## 2025-01-22 VITALS
OXYGEN SATURATION: 100 % | RESPIRATION RATE: 20 BRPM | HEART RATE: 88 BPM | SYSTOLIC BLOOD PRESSURE: 114 MMHG | TEMPERATURE: 98.2 F | DIASTOLIC BLOOD PRESSURE: 68 MMHG

## 2025-01-22 DIAGNOSIS — D50.9 IRON DEFICIENCY ANEMIA, UNSPECIFIED IRON DEFICIENCY ANEMIA TYPE: Primary | ICD-10-CM

## 2025-01-22 DIAGNOSIS — O99.013 ANEMIA AFFECTING PREGNANCY IN THIRD TRIMESTER: ICD-10-CM

## 2025-01-22 PROCEDURE — 250N000011 HC RX IP 250 OP 636: Performed by: FAMILY MEDICINE

## 2025-01-22 PROCEDURE — 258N000003 HC RX IP 258 OP 636: Performed by: FAMILY MEDICINE

## 2025-01-22 PROCEDURE — 96374 THER/PROPH/DIAG INJ IV PUSH: CPT

## 2025-01-22 RX ORDER — MEPERIDINE HYDROCHLORIDE 25 MG/ML
25 INJECTION INTRAMUSCULAR; INTRAVENOUS; SUBCUTANEOUS
OUTPATIENT
Start: 2025-01-24

## 2025-01-22 RX ORDER — ALBUTEROL SULFATE 0.83 MG/ML
2.5 SOLUTION RESPIRATORY (INHALATION)
OUTPATIENT
Start: 2025-01-24

## 2025-01-22 RX ORDER — DIPHENHYDRAMINE HYDROCHLORIDE 50 MG/ML
50 INJECTION INTRAMUSCULAR; INTRAVENOUS
Status: DISCONTINUED | OUTPATIENT
Start: 2025-01-22 | End: 2025-01-22 | Stop reason: HOSPADM

## 2025-01-22 RX ORDER — HEPARIN SODIUM (PORCINE) LOCK FLUSH IV SOLN 100 UNIT/ML 100 UNIT/ML
5 SOLUTION INTRAVENOUS
OUTPATIENT
Start: 2025-01-24

## 2025-01-22 RX ORDER — HEPARIN SODIUM,PORCINE 10 UNIT/ML
5-20 VIAL (ML) INTRAVENOUS DAILY PRN
OUTPATIENT
Start: 2025-01-24

## 2025-01-22 RX ORDER — DIPHENHYDRAMINE HYDROCHLORIDE 50 MG/ML
25 INJECTION INTRAMUSCULAR; INTRAVENOUS
Start: 2025-01-24

## 2025-01-22 RX ORDER — ALBUTEROL SULFATE 90 UG/1
1-2 INHALANT RESPIRATORY (INHALATION)
Status: DISCONTINUED | OUTPATIENT
Start: 2025-01-22 | End: 2025-01-22 | Stop reason: HOSPADM

## 2025-01-22 RX ORDER — ALBUTEROL SULFATE 0.83 MG/ML
2.5 SOLUTION RESPIRATORY (INHALATION)
Status: DISCONTINUED | OUTPATIENT
Start: 2025-01-22 | End: 2025-01-22 | Stop reason: HOSPADM

## 2025-01-22 RX ORDER — MEPERIDINE HYDROCHLORIDE 25 MG/ML
25 INJECTION INTRAMUSCULAR; INTRAVENOUS; SUBCUTANEOUS
Status: DISCONTINUED | OUTPATIENT
Start: 2025-01-22 | End: 2025-01-22 | Stop reason: HOSPADM

## 2025-01-22 RX ORDER — DIPHENHYDRAMINE HYDROCHLORIDE 50 MG/ML
25 INJECTION INTRAMUSCULAR; INTRAVENOUS
Status: DISCONTINUED | OUTPATIENT
Start: 2025-01-22 | End: 2025-01-22 | Stop reason: HOSPADM

## 2025-01-22 RX ORDER — EPINEPHRINE 1 MG/ML
0.3 INJECTION, SOLUTION INTRAMUSCULAR; SUBCUTANEOUS EVERY 5 MIN PRN
OUTPATIENT
Start: 2025-01-24

## 2025-01-22 RX ORDER — DIPHENHYDRAMINE HYDROCHLORIDE 50 MG/ML
50 INJECTION INTRAMUSCULAR; INTRAVENOUS
Start: 2025-01-24

## 2025-01-22 RX ORDER — METHYLPREDNISOLONE SODIUM SUCCINATE 40 MG/ML
40 INJECTION INTRAMUSCULAR; INTRAVENOUS
Status: DISCONTINUED | OUTPATIENT
Start: 2025-01-22 | End: 2025-01-22 | Stop reason: HOSPADM

## 2025-01-22 RX ORDER — EPINEPHRINE 1 MG/ML
0.3 INJECTION, SOLUTION INTRAMUSCULAR; SUBCUTANEOUS EVERY 5 MIN PRN
Status: DISCONTINUED | OUTPATIENT
Start: 2025-01-22 | End: 2025-01-22 | Stop reason: HOSPADM

## 2025-01-22 RX ORDER — ALBUTEROL SULFATE 90 UG/1
1-2 INHALANT RESPIRATORY (INHALATION)
Start: 2025-01-24

## 2025-01-22 RX ORDER — METHYLPREDNISOLONE SODIUM SUCCINATE 40 MG/ML
40 INJECTION INTRAMUSCULAR; INTRAVENOUS
Start: 2025-01-24

## 2025-01-22 RX ADMIN — SODIUM CHLORIDE 250 ML: 9 INJECTION, SOLUTION INTRAVENOUS at 14:32

## 2025-01-22 RX ADMIN — IRON SUCROSE 200 MG: 20 INJECTION, SOLUTION INTRAVENOUS at 14:33

## 2025-01-22 ASSESSMENT — PAIN SCALES - GENERAL: PAINLEVEL_OUTOF10: NO PAIN (0)

## 2025-01-22 NOTE — PROGRESS NOTES
Infusion Nursing Note:  Gisela Moon presents today for Venofer 200 mg.    Patient seen by provider today: No   present during visit today: Not Applicable.    Note: Gisela arrived into the infusion center ambulatory in a stable condition for Venofer 200 my, VSS. Plan of care reviewed, she verbalized understanding of her plan of care and return to clinic. She stated she has tolerated Iron so far and she is feeling better.    Intravenous Access:  Peripheral IV placed.    Treatment Conditions:  Not Applicable.    Post Infusion Assessment:  Patient tolerated infusion without incident.  Patient observed for 30 minutes post Iron infusion  per protocol.  Site patent and intact, free from redness, edema or discomfort.  No evidence of extravasations.  Access discontinued per protocol.     Discharge Plan:   Discharge instructions reviewed with: Patient.  Patient and/or family verbalized understanding of discharge instructions and all questions answered.  Patient discharged in stable condition accompanied by: self.  Departure Mode: Ambulatory.    Alberta Marie RN

## 2025-01-23 ENCOUNTER — PRENATAL OFFICE VISIT (OUTPATIENT)
Dept: FAMILY MEDICINE | Facility: CLINIC | Age: 25
End: 2025-01-23
Payer: COMMERCIAL

## 2025-01-23 VITALS
WEIGHT: 151.2 LBS | DIASTOLIC BLOOD PRESSURE: 69 MMHG | SYSTOLIC BLOOD PRESSURE: 104 MMHG | HEIGHT: 60 IN | TEMPERATURE: 98.1 F | OXYGEN SATURATION: 98 % | HEART RATE: 89 BPM | BODY MASS INDEX: 29.68 KG/M2 | RESPIRATION RATE: 16 BRPM

## 2025-01-23 DIAGNOSIS — Z34.90 PREGNANCY, UNSPECIFIED GESTATIONAL AGE: Primary | ICD-10-CM

## 2025-01-23 DIAGNOSIS — Z87.51 HISTORY OF PRETERM DELIVERY: ICD-10-CM

## 2025-01-23 NOTE — PROGRESS NOTES
"SUBJECTIVE:   at 37w1d. Estimated Date of Delivery: 2025.  She is doing well. She denies vaginal bleeding, leakage of fluid, or urinary symptoms. Fetal movement is present. She is not having contractions.  Concerns: had some pelvic cramping but that has resolved. Feels the baby dropped. Concerns about having a bigger baby and her anal fissure- she would like to be induced by 38 weeks.  ROS  Negative except as noted above in HPI  OBJECTIVE:  Blood pressure 104/69, pulse 89, temperature 98.1  F (36.7  C), temperature source Oral, resp. rate 16, height 1.532 m (5' 0.32\"), weight 68.6 kg (151 lb 3.2 oz), last menstrual period 2024, SpO2 98%, not currently breastfeeding.  Gen: Comfortable, no acute distress.  Abd: Gravid, non-tender  See OB Vitals flowsheet  ASSESSMENT/PLAN:  Gisela was seen today for prenatal care.    Diagnoses and all orders for this visit:    Pregnancy, unspecified gestational age    History of  delivery      -IUP at 37w1d:   Prenatal labs reviewed   Discussed elective induction option at 39 weeks. SVE 2.5/70/-3 today. Reviewed that unfortunately anal fissure is not a medical indication for induction.  RTC in 1 weeks or sooner with problems.        Cassie Iyer MD    "

## 2025-01-28 ENCOUNTER — MYC MEDICAL ADVICE (OUTPATIENT)
Dept: FAMILY MEDICINE | Facility: CLINIC | Age: 25
End: 2025-01-28
Payer: COMMERCIAL

## 2025-01-30 ENCOUNTER — PRENATAL OFFICE VISIT (OUTPATIENT)
Dept: FAMILY MEDICINE | Facility: CLINIC | Age: 25
End: 2025-01-30
Payer: COMMERCIAL

## 2025-01-30 VITALS
OXYGEN SATURATION: 99 % | SYSTOLIC BLOOD PRESSURE: 113 MMHG | HEART RATE: 80 BPM | TEMPERATURE: 98.5 F | HEIGHT: 60 IN | WEIGHT: 152.4 LBS | DIASTOLIC BLOOD PRESSURE: 74 MMHG | RESPIRATION RATE: 16 BRPM | BODY MASS INDEX: 29.92 KG/M2

## 2025-01-30 DIAGNOSIS — Z34.90 PREGNANCY, UNSPECIFIED GESTATIONAL AGE: Primary | ICD-10-CM

## 2025-01-30 NOTE — PROGRESS NOTES
"SUBJECTIVE:   at 38w1d. Estimated Date of Delivery: 2025.  She is doing well. She denies vaginal bleeding, leakage of fluid, or urinary symptoms. Fetal movement is present. She is having cramping but no regular or painful contractions.  Concerns: would like membrane sweep today  ROS  Negative except as noted above in HPI  OBJECTIVE:  Blood pressure 113/74, pulse 80, temperature 98.5  F (36.9  C), temperature source Oral, resp. rate 16, height 1.532 m (5' 0.32\"), weight 69.1 kg (152 lb 6.4 oz), last menstrual period 2024, SpO2 99%, not currently breastfeeding.  Gen: Comfortable, no acute distress.  Abd: Gravid, non-tender  See OB Vitals flowsheet  SVE: 3.5/70/-2  ASSESSMENT/PLAN:  Gisela was seen today for prenatal care.    Diagnoses and all orders for this visit:    Pregnancy, unspecified gestational age      -IUP at 38w1d:   Prenatal labs reviewed   Discussed option for elective induction at 39 weeks  RTC in 1 weeks or sooner with problems.      Cassie Iyer MD    "

## 2025-02-02 ENCOUNTER — NURSE TRIAGE (OUTPATIENT)
Dept: NURSING | Facility: CLINIC | Age: 25
End: 2025-02-02
Payer: COMMERCIAL

## 2025-02-02 ENCOUNTER — HOSPITAL ENCOUNTER (OUTPATIENT)
Facility: HOSPITAL | Age: 25
Discharge: HOME | End: 2025-02-02
Attending: FAMILY MEDICINE | Admitting: FAMILY MEDICINE
Payer: COMMERCIAL

## 2025-02-02 VITALS
DIASTOLIC BLOOD PRESSURE: 74 MMHG | RESPIRATION RATE: 16 BRPM | TEMPERATURE: 98.3 F | WEIGHT: 150 LBS | BODY MASS INDEX: 29.45 KG/M2 | HEIGHT: 60 IN | SYSTOLIC BLOOD PRESSURE: 106 MMHG | OXYGEN SATURATION: 97 %

## 2025-02-02 PROBLEM — Z36.89 ENCOUNTER FOR TRIAGE IN PREGNANT PATIENT: Status: ACTIVE | Noted: 2025-02-02

## 2025-02-02 ASSESSMENT — ACTIVITIES OF DAILY LIVING (ADL)
ADLS_ACUITY_SCORE: 27
ADLS_ACUITY_SCORE: 27
ADLS_ACUITY_SCORE: 26

## 2025-02-03 NOTE — DISCHARGE INSTRUCTIONS
"  Counting Your Baby's Kicks: Care Instructions  Overview     Counting your baby's kicks is one way your doctor can tell that your baby is healthy. You will probably feel your baby move for the first time between 16 and 22 weeks. The movement may feel like flutters rather than kicks. Your baby may move more at certain times of the day. When you are active, you may notice less kicking than when you are resting. At your prenatal visits, your doctor will ask whether the baby is active.  In your last trimester, your doctor may ask you to count the number of times you feel your baby move.  Follow-up care is a key part of your treatment and safety. Be sure to make and go to all appointments, and call your doctor if you are having problems. It's also a good idea to know your test results and keep a list of the medicines you take.  How do you count fetal kicks?  A common method of checking your baby's movement is to note the length of time it takes to count 10 movements (such as kicks, flutters, or rolls).  Pick your baby's most active time of day to count. This may be any time from morning to evening.  If you don't feel 10 movements in an hour, have something to eat or drink and count for another hour. If you don't feel at least 10 movements in the 2-hour period, call your doctor.  Do not use an at-home Doppler heart monitor in place of counting fetal movements.  When should you call for help?   Call your doctor now or seek immediate medical care if:    You feel fewer than 10 movements in a 2-hour period.     You noticed that your baby has stopped moving or is moving less than normal.   Watch closely for changes in your health, and be sure to contact your doctor if you have any problems.  Where can you learn more?  Go to https://www.healthwise.net/patiented  Enter U048 in the search box to learn more about \"Counting Your Baby's Kicks: Care Instructions.\"  Current as of: April 30, 2024  Content Version: 14.3    2024 [employer].   Care instructions adapted under license by your healthcare professional. If you have questions about a medical condition or this instruction, always ask your healthcare professional. [employer] disclaims any warranty or liability for your use of this information.  Learning About When to Call Your Doctor During Pregnancy (After 20 Weeks)  Overview  It's common to have concerns about what might be a problem when you're pregnant. Most pregnancies don't have any serious problems. But it's still important to know when to call your doctor if you have certain symptoms or signs of labor.  These are general suggestions. Your doctor may give you some more information about when to call.  When to call your doctor (after 20 weeks)  Call 911  anytime you think you may need emergency care. For example, call if:  You have severe vaginal bleeding. This means you are soaking through a pad each hour for 2 or more hours.  You have sudden, severe pain in your belly.  You have chest pain, are short of breath, or cough up blood.  You passed out (lost consciousness).  You have a seizure.  You see or feel the umbilical cord.  You think you are about to deliver your baby and can't make it safely to the hospital or birthing center.  Call your doctor now or seek immediate medical care if:  You have vaginal bleeding.  You have belly pain.  You have a fever.  You are dizzy or lightheaded, or you feel like you may faint.  You have signs of a blood clot in your leg (called a deep vein thrombosis), such as:  Pain in the calf, back of the knee, thigh, or groin.  Swelling in your leg or groin.  A color change on the leg or groin. The skin may be reddish or purplish, depending on your usual skin color.  You have symptoms of preeclampsia, such as:  Sudden swelling of your face, hands, or feet.  New vision problems (such as dimness, blurring, or seeing spots).  A severe headache.  You have a sudden release of fluid from  "your vagina. (You think your water broke.)  You've been having regular contractions for an hour. This means that you've had at least 6 contractions within 1 hour, even after you change your position and drink fluids.  You notice that your baby has stopped moving or is moving less than normal.  You have signs of heart failure, such as:  New or increased shortness of breath.  New or worse swelling in your legs, ankles, or feet.  Sudden weight gain, such as more than 2 to 3 pounds in a day or 5 pounds in a week.  Feeling so tired or weak that you cannot do your usual activities.  You have symptoms of a urinary tract infection. These may include:  Pain or burning when you urinate.  A frequent need to urinate without being able to pass much urine.  Pain in the flank, which is just below the rib cage and above the waist on either side of the back.  Blood in your urine.  Watch closely for changes in your health, and be sure to contact your doctor if:  You have vaginal discharge that smells bad.  You feel sad, anxious, or hopeless for more than a few days.  You have skin changes, such as a rash, itching, or a yellow color to your skin.  You have other concerns about your pregnancy.  If you have labor signs at 37 weeks or more  If you have signs of labor at 37 weeks or more, your doctor may tell you to call when your labor becomes more active. Symptoms of active labor include:  Contractions that are regular.  Contractions that are less than 5 minutes apart.  Contractions that are hard to talk through.  Follow-up care is a key part of your treatment and safety. Be sure to make and go to all appointments, and call your doctor if you are having problems. It's also a good idea to know your test results and keep a list of the medicines you take.  Where can you learn more?  Go to https://www.healthwise.net/patiented  Enter N531 in the search box to learn more about \"Learning About When to Call Your Doctor During Pregnancy (After " "20 Weeks).\"  Current as of: April 30, 2024  Content Version: 14.3    2024 [employer].   Care instructions adapted under license by your healthcare professional. If you have questions about a medical condition or this instruction, always ask your healthcare professional. [employer] disclaims any warranty or liability for your use of this information.    "

## 2025-02-03 NOTE — PLAN OF CARE
"Data: Patient presented to [facility]: 2025  8:54 PM.  Reason for maternal/fetal assessment is fall/trauma. Patient reports fall at home at approximately 2030 onto hands and knees. Patient denies constant abdominal pain, vaginal bleeding, or leaking of fluid. Does endorse intermittent \"Marvin Strange contractions\". Patient reports fetal movement is normal. Patient is a 38w4d .  Prenatal record reviewed. Pregnancy has been uncomplicated.    Vital signs wnl. Support person is not present due to performing childcare duties at home.     Action: Verbal consent for EFM. Triage assessment completed.     Response: Patient verbalized agreement with plan. Dr. Iyer notified of arrival, SBAR, physical assessment, OB assessment, sterile vaginal exam and EFM data. Continue to monitor. Not necessary to keep patient for observation for 4 hours after fall as patient did not experience any impact to abdomen, fell on hands and knees. Recheck sterile vaginal exam in approximately 2 hours to assess for potential onset of labor.  "

## 2025-02-03 NOTE — PLAN OF CARE
"Data: Patient assessed in the [facility] for fall at home and uterine contractions which began after her fall. Cervix 4.5 cm dilated and 80% effaced, which is similar to exam in office on 1/30 per patient and Dr. Iyer. Fetal station -3. Membranes intact. Contractions are, 2-4 minutes apart, and last  seconds. Uterine assessment is moderate by palpation during contractions and soft by palpation at rest. Fetal heart rate tracing Category 1. Patient endorses good fetal movement, denies abdominal pain in between \"Marvin Strange contractions,\" as termed by patient. No vaginal bleeding or leaking of fluid.     Action:  Presumed adequate fetal oxygenation documented. Patient prefers to be discharged to home and will report back to hospital ASAP if contractions becoming more frequent or uncomfortable. Early labor precautions and discharge instructions reviewed, including when to notify provider if warning signs present. Patient instructed to report decrease in fetal movement, vaginal bleeding, changes in membrane status, abdominal pain, or any concerns related to the pregnancy to patient's provider/clinic. RN emphasized importance of IV antibiotics for GBS prophylaxis in labor, which necessitates prompt return to hospital for labor. Patient states that she understands and lives 5 minutes away.    Response: Orders to discharge home per Dr. Iyer. Plan for patient is to monitor labor status at home per self. Patient verbalized understanding of education and agreement with plan. Discharged to home at 2336.              "

## 2025-02-03 NOTE — TELEPHONE ENCOUNTER
"Quick question. Fell, didn't hit stomach but fell on hands and knees. Should she get checked. She is pregnant.    OB Triage Call      Is patient's OB/Midwife with the [facility]? [facility]- Is patient's primary OB a Midwife? No- Proceed with triage.     Reason for call: fall while pregnant    Assessment: needs evaluation    Plan: labor and delivery    Patient plans to deliver at [place]    Patient's primary OB Provider is Jaylon.    Per protocol recommendations Patient to be evaluated in L&D.  Patient plans to deliver at [facility]ivering [facility]: [facility] ([phone]).  [facility] notified of patient's pending arrival.  Report given to Gail.      Is patient's delivering hospital on divert? No      38w4d    Estimated Date of Delivery: 2025        OB History    Para Term  AB Living   4 1 0 1 2 1   SAB IAB Ectopic Multiple Live Births   2 0 0 0 1      # Outcome Date GA Lbr Donald/2nd Weight Sex Type Anes PTL Lv   4 Current            3 SAB 23     SAB      2  10/07/22 34w0d 07:45 / 00:29 1.99 kg (4 lb 6.2 oz) M Vag-Spont IV Y JENNIFER      Name: Rey Leyva      Apgar1: 7  Apgar5: 9   1 SAB 2021               No results found for: \"GBS\"       Fabiola Lobo RN     Reason for Disposition   [1] Pregnant 20 or more weeks AND [2] fall to ground or floor (e.g., walking and tripped)    Additional Information   Negative: Major injury from dangerous force (e.g., fall > 10 feet or 3 meters)   Negative: [1] Major bleeding (e.g., actively dripping or spurting) AND [2] can't be stopped   Negative: Shock suspected (e.g., cold/pale/clammy skin, too weak to stand)   Negative: SEVERE abdominal pain   Negative: [1] Vaginal bleeding AND [2] pregnant 20 or more weeks   Negative: Sounds like a life-threatening emergency to the triager   Negative: [1] Vaginal bleeding AND [2] pregnant < 20 weeks  (Exception: Single episode of spotting.)   Negative: [1] Abdominal pain (not " severe) AND [2] pregnant < 20 weeks   Negative: [1] Abdominal pain (not severe) AND [2] present > 1 hour   Negative: Sounds like a serious injury to the triager    Protocols used: Pregnancy - Fall-A-AH

## 2025-02-03 NOTE — PLAN OF CARE
Discussed plan of care options with patient - patient prefers to go home knowing risks of GBS positive status without adequate antibiotic treatment - will immediately report back to hospital if contractions increasing in intensity or frequency, or for other signs/symptoms requiring urgent care, such as vaginal bleeding, decreased fetal movement or any other suspicion that emergent OB evaluation is required.

## 2025-02-03 NOTE — TELEPHONE ENCOUNTER
Called patient. Discussed options- she prefers elective induction at 39 weeks. Called L&D and scheduled for 2/5/24- 5:30am- patient will call L&D prior to leaving the house. Answered all questions.    Cassie Iyer MD

## 2025-02-05 ENCOUNTER — HOSPITAL ENCOUNTER (INPATIENT)
Facility: HOSPITAL | Age: 25
LOS: 1 days | Discharge: HOME | End: 2025-02-06
Attending: FAMILY MEDICINE | Admitting: FAMILY MEDICINE
Payer: COMMERCIAL

## 2025-02-05 PROBLEM — Z87.51 HISTORY OF PRETERM DELIVERY: Status: ACTIVE | Noted: 2022-10-14

## 2025-02-05 PROBLEM — D50.9 ANEMIA, IRON DEFICIENCY: Status: ACTIVE | Noted: 2024-12-20

## 2025-02-05 LAB
ABO + RH BLD: NORMAL
BLD GP AB SCN SERPL QL: NEGATIVE
HGB BLD-MCNC: 10.3 G/DL (ref 11.7–15.7)
HOLD SPECIMEN: NORMAL
SPECIMEN EXP DATE BLD: NORMAL
T PALLIDUM AB SER QL: NONREACTIVE

## 2025-02-05 PROCEDURE — 59400 OBSTETRICAL CARE: CPT | Performed by: FAMILY MEDICINE

## 2025-02-05 PROCEDURE — 85018 HEMOGLOBIN: CPT | Performed by: FAMILY MEDICINE

## 2025-02-05 PROCEDURE — 258N000003 HC RX IP 258 OP 636: Performed by: FAMILY MEDICINE

## 2025-02-05 PROCEDURE — 0UQMXZZ REPAIR VULVA, EXTERNAL APPROACH: ICD-10-PCS | Performed by: FAMILY MEDICINE

## 2025-02-05 PROCEDURE — 722N000001 HC LABOR CARE VAGINAL DELIVERY SINGLE

## 2025-02-05 PROCEDURE — 120N000001 HC R&B MED SURG/OB

## 2025-02-05 PROCEDURE — 250N000013 HC RX MED GY IP 250 OP 250 PS 637: Performed by: FAMILY MEDICINE

## 2025-02-05 PROCEDURE — 250N000009 HC RX 250: Performed by: FAMILY MEDICINE

## 2025-02-05 PROCEDURE — 86850 RBC ANTIBODY SCREEN: CPT | Performed by: FAMILY MEDICINE

## 2025-02-05 PROCEDURE — 0HQ9XZZ REPAIR PERINEUM SKIN, EXTERNAL APPROACH: ICD-10-PCS | Performed by: FAMILY MEDICINE

## 2025-02-05 PROCEDURE — 250N000011 HC RX IP 250 OP 636: Performed by: FAMILY MEDICINE

## 2025-02-05 PROCEDURE — 86780 TREPONEMA PALLIDUM: CPT | Performed by: FAMILY MEDICINE

## 2025-02-05 PROCEDURE — 36415 COLL VENOUS BLD VENIPUNCTURE: CPT | Performed by: FAMILY MEDICINE

## 2025-02-05 PROCEDURE — 86900 BLOOD TYPING SEROLOGIC ABO: CPT | Performed by: FAMILY MEDICINE

## 2025-02-05 PROCEDURE — 3E033VJ INTRODUCTION OF OTHER HORMONE INTO PERIPHERAL VEIN, PERCUTANEOUS APPROACH: ICD-10-PCS | Performed by: FAMILY MEDICINE

## 2025-02-05 RX ORDER — PROCHLORPERAZINE MALEATE 10 MG
10 TABLET ORAL EVERY 6 HOURS PRN
Status: DISCONTINUED | OUTPATIENT
Start: 2025-02-05 | End: 2025-02-05 | Stop reason: HOSPADM

## 2025-02-05 RX ORDER — OXYTOCIN/0.9 % SODIUM CHLORIDE 30/500 ML
340 PLASTIC BAG, INJECTION (ML) INTRAVENOUS CONTINUOUS PRN
Status: DISCONTINUED | OUTPATIENT
Start: 2025-02-05 | End: 2025-02-07 | Stop reason: HOSPADM

## 2025-02-05 RX ORDER — PENICILLIN G 3000000 [IU]/50ML
3 INJECTION, SOLUTION INTRAVENOUS EVERY 4 HOURS
Status: DISCONTINUED | OUTPATIENT
Start: 2025-02-05 | End: 2025-02-05 | Stop reason: HOSPADM

## 2025-02-05 RX ORDER — LOPERAMIDE HYDROCHLORIDE 2 MG/1
4 CAPSULE ORAL
Status: DISCONTINUED | OUTPATIENT
Start: 2025-02-05 | End: 2025-02-05 | Stop reason: HOSPADM

## 2025-02-05 RX ORDER — NALOXONE HYDROCHLORIDE 0.4 MG/ML
0.4 INJECTION, SOLUTION INTRAMUSCULAR; INTRAVENOUS; SUBCUTANEOUS
Status: DISCONTINUED | OUTPATIENT
Start: 2025-02-05 | End: 2025-02-05 | Stop reason: HOSPADM

## 2025-02-05 RX ORDER — TRANEXAMIC ACID 10 MG/ML
1 INJECTION, SOLUTION INTRAVENOUS EVERY 30 MIN PRN
Status: DISCONTINUED | OUTPATIENT
Start: 2025-02-05 | End: 2025-02-05 | Stop reason: HOSPADM

## 2025-02-05 RX ORDER — ACETAMINOPHEN 325 MG/1
650 TABLET ORAL EVERY 4 HOURS PRN
Status: DISCONTINUED | OUTPATIENT
Start: 2025-02-05 | End: 2025-02-07 | Stop reason: HOSPADM

## 2025-02-05 RX ORDER — ONDANSETRON 2 MG/ML
4 INJECTION INTRAMUSCULAR; INTRAVENOUS EVERY 6 HOURS PRN
Status: DISCONTINUED | OUTPATIENT
Start: 2025-02-05 | End: 2025-02-05 | Stop reason: HOSPADM

## 2025-02-05 RX ORDER — SODIUM CHLORIDE, SODIUM LACTATE, POTASSIUM CHLORIDE, CALCIUM CHLORIDE 600; 310; 30; 20 MG/100ML; MG/100ML; MG/100ML; MG/100ML
INJECTION, SOLUTION INTRAVENOUS CONTINUOUS PRN
Status: DISCONTINUED | OUTPATIENT
Start: 2025-02-05 | End: 2025-02-05 | Stop reason: HOSPADM

## 2025-02-05 RX ORDER — METOCLOPRAMIDE 10 MG/1
10 TABLET ORAL EVERY 6 HOURS PRN
Status: DISCONTINUED | OUTPATIENT
Start: 2025-02-05 | End: 2025-02-05 | Stop reason: HOSPADM

## 2025-02-05 RX ORDER — KETOROLAC TROMETHAMINE 30 MG/ML
30 INJECTION, SOLUTION INTRAMUSCULAR; INTRAVENOUS
Status: COMPLETED | OUTPATIENT
Start: 2025-02-05 | End: 2025-02-05

## 2025-02-05 RX ORDER — MISOPROSTOL 200 UG/1
800 TABLET ORAL
Status: DISCONTINUED | OUTPATIENT
Start: 2025-02-05 | End: 2025-02-07 | Stop reason: HOSPADM

## 2025-02-05 RX ORDER — MISOPROSTOL 200 UG/1
800 TABLET ORAL
Status: DISCONTINUED | OUTPATIENT
Start: 2025-02-05 | End: 2025-02-05 | Stop reason: HOSPADM

## 2025-02-05 RX ORDER — LIDOCAINE 40 MG/G
CREAM TOPICAL
Status: DISCONTINUED | OUTPATIENT
Start: 2025-02-05 | End: 2025-02-05 | Stop reason: HOSPADM

## 2025-02-05 RX ORDER — HYDROCORTISONE 25 MG/G
CREAM TOPICAL 3 TIMES DAILY PRN
Status: DISCONTINUED | OUTPATIENT
Start: 2025-02-05 | End: 2025-02-07 | Stop reason: HOSPADM

## 2025-02-05 RX ORDER — BISACODYL 10 MG
10 SUPPOSITORY, RECTAL RECTAL DAILY PRN
Status: DISCONTINUED | OUTPATIENT
Start: 2025-02-05 | End: 2025-02-07 | Stop reason: HOSPADM

## 2025-02-05 RX ORDER — METHYLERGONOVINE MALEATE 0.2 MG/ML
200 INJECTION INTRAVENOUS
Status: DISCONTINUED | OUTPATIENT
Start: 2025-02-05 | End: 2025-02-05 | Stop reason: HOSPADM

## 2025-02-05 RX ORDER — TRANEXAMIC ACID 10 MG/ML
1 INJECTION, SOLUTION INTRAVENOUS EVERY 30 MIN PRN
Status: DISCONTINUED | OUTPATIENT
Start: 2025-02-05 | End: 2025-02-07 | Stop reason: HOSPADM

## 2025-02-05 RX ORDER — DOCUSATE SODIUM 100 MG/1
100 CAPSULE, LIQUID FILLED ORAL DAILY
Status: DISCONTINUED | OUTPATIENT
Start: 2025-02-05 | End: 2025-02-07 | Stop reason: HOSPADM

## 2025-02-05 RX ORDER — LOPERAMIDE HYDROCHLORIDE 2 MG/1
2 CAPSULE ORAL
Status: DISCONTINUED | OUTPATIENT
Start: 2025-02-05 | End: 2025-02-07 | Stop reason: HOSPADM

## 2025-02-05 RX ORDER — CARBOPROST TROMETHAMINE 250 UG/ML
250 INJECTION, SOLUTION INTRAMUSCULAR
Status: DISCONTINUED | OUTPATIENT
Start: 2025-02-05 | End: 2025-02-05 | Stop reason: HOSPADM

## 2025-02-05 RX ORDER — ONDANSETRON 4 MG/1
4 TABLET, ORALLY DISINTEGRATING ORAL EVERY 6 HOURS PRN
Status: DISCONTINUED | OUTPATIENT
Start: 2025-02-05 | End: 2025-02-05 | Stop reason: HOSPADM

## 2025-02-05 RX ORDER — OXYTOCIN 10 [USP'U]/ML
10 INJECTION, SOLUTION INTRAMUSCULAR; INTRAVENOUS
Status: DISCONTINUED | OUTPATIENT
Start: 2025-02-05 | End: 2025-02-07 | Stop reason: HOSPADM

## 2025-02-05 RX ORDER — NALOXONE HYDROCHLORIDE 0.4 MG/ML
0.2 INJECTION, SOLUTION INTRAMUSCULAR; INTRAVENOUS; SUBCUTANEOUS
Status: DISCONTINUED | OUTPATIENT
Start: 2025-02-05 | End: 2025-02-05 | Stop reason: HOSPADM

## 2025-02-05 RX ORDER — METHYLERGONOVINE MALEATE 0.2 MG/ML
200 INJECTION INTRAVENOUS
Status: DISCONTINUED | OUTPATIENT
Start: 2025-02-05 | End: 2025-02-07 | Stop reason: HOSPADM

## 2025-02-05 RX ORDER — OXYTOCIN/0.9 % SODIUM CHLORIDE 30/500 ML
100-340 PLASTIC BAG, INJECTION (ML) INTRAVENOUS CONTINUOUS PRN
Status: DISCONTINUED | OUTPATIENT
Start: 2025-02-05 | End: 2025-02-07 | Stop reason: HOSPADM

## 2025-02-05 RX ORDER — MODIFIED LANOLIN
OINTMENT (GRAM) TOPICAL
Status: DISCONTINUED | OUTPATIENT
Start: 2025-02-05 | End: 2025-02-07 | Stop reason: HOSPADM

## 2025-02-05 RX ORDER — SODIUM CHLORIDE, SODIUM LACTATE, POTASSIUM CHLORIDE, CALCIUM CHLORIDE 600; 310; 30; 20 MG/100ML; MG/100ML; MG/100ML; MG/100ML
INJECTION, SOLUTION INTRAVENOUS CONTINUOUS
Status: DISCONTINUED | OUTPATIENT
Start: 2025-02-05 | End: 2025-02-05 | Stop reason: HOSPADM

## 2025-02-05 RX ORDER — METOCLOPRAMIDE HYDROCHLORIDE 5 MG/ML
10 INJECTION INTRAMUSCULAR; INTRAVENOUS EVERY 6 HOURS PRN
Status: DISCONTINUED | OUTPATIENT
Start: 2025-02-05 | End: 2025-02-05 | Stop reason: HOSPADM

## 2025-02-05 RX ORDER — PENICILLIN G POTASSIUM 5000000 [IU]/1
5 INJECTION, POWDER, FOR SOLUTION INTRAMUSCULAR; INTRAVENOUS ONCE
Status: COMPLETED | OUTPATIENT
Start: 2025-02-05 | End: 2025-02-05

## 2025-02-05 RX ORDER — IBUPROFEN 800 MG/1
800 TABLET, FILM COATED ORAL EVERY 6 HOURS PRN
Status: DISCONTINUED | OUTPATIENT
Start: 2025-02-06 | End: 2025-02-07 | Stop reason: HOSPADM

## 2025-02-05 RX ORDER — OXYTOCIN/0.9 % SODIUM CHLORIDE 30/500 ML
340 PLASTIC BAG, INJECTION (ML) INTRAVENOUS CONTINUOUS PRN
Status: DISCONTINUED | OUTPATIENT
Start: 2025-02-05 | End: 2025-02-05 | Stop reason: HOSPADM

## 2025-02-05 RX ORDER — CARBOPROST TROMETHAMINE 250 UG/ML
250 INJECTION, SOLUTION INTRAMUSCULAR
Status: DISCONTINUED | OUTPATIENT
Start: 2025-02-05 | End: 2025-02-07 | Stop reason: HOSPADM

## 2025-02-05 RX ORDER — OXYTOCIN 10 [USP'U]/ML
10 INJECTION, SOLUTION INTRAMUSCULAR; INTRAVENOUS
Status: DISCONTINUED | OUTPATIENT
Start: 2025-02-05 | End: 2025-02-05 | Stop reason: HOSPADM

## 2025-02-05 RX ORDER — IBUPROFEN 800 MG/1
800 TABLET, FILM COATED ORAL
Status: COMPLETED | OUTPATIENT
Start: 2025-02-05 | End: 2025-02-05

## 2025-02-05 RX ORDER — MISOPROSTOL 200 UG/1
400 TABLET ORAL
Status: DISCONTINUED | OUTPATIENT
Start: 2025-02-05 | End: 2025-02-07 | Stop reason: HOSPADM

## 2025-02-05 RX ORDER — LOPERAMIDE HYDROCHLORIDE 2 MG/1
4 CAPSULE ORAL
Status: DISCONTINUED | OUTPATIENT
Start: 2025-02-05 | End: 2025-02-07 | Stop reason: HOSPADM

## 2025-02-05 RX ORDER — OXYTOCIN/0.9 % SODIUM CHLORIDE 30/500 ML
1-24 PLASTIC BAG, INJECTION (ML) INTRAVENOUS CONTINUOUS
Status: DISCONTINUED | OUTPATIENT
Start: 2025-02-05 | End: 2025-02-05 | Stop reason: HOSPADM

## 2025-02-05 RX ORDER — MISOPROSTOL 200 UG/1
400 TABLET ORAL
Status: DISCONTINUED | OUTPATIENT
Start: 2025-02-05 | End: 2025-02-05 | Stop reason: HOSPADM

## 2025-02-05 RX ORDER — CITRIC ACID/SODIUM CITRATE 334-500MG
30 SOLUTION, ORAL ORAL
Status: DISCONTINUED | OUTPATIENT
Start: 2025-02-05 | End: 2025-02-05 | Stop reason: HOSPADM

## 2025-02-05 RX ORDER — LOPERAMIDE HYDROCHLORIDE 2 MG/1
2 CAPSULE ORAL
Status: DISCONTINUED | OUTPATIENT
Start: 2025-02-05 | End: 2025-02-05 | Stop reason: HOSPADM

## 2025-02-05 RX ADMIN — SODIUM CHLORIDE, POTASSIUM CHLORIDE, SODIUM LACTATE AND CALCIUM CHLORIDE: 600; 310; 30; 20 INJECTION, SOLUTION INTRAVENOUS at 10:42

## 2025-02-05 RX ADMIN — LIDOCAINE HYDROCHLORIDE 20 ML: 10 INJECTION, SOLUTION EPIDURAL; INFILTRATION; INTRACAUDAL; PERINEURAL at 18:28

## 2025-02-05 RX ADMIN — KETOROLAC TROMETHAMINE 30 MG: 30 INJECTION, SOLUTION INTRAMUSCULAR at 18:51

## 2025-02-05 RX ADMIN — BENZOCAINE AND LEVOMENTHOL: 200; 5 SPRAY TOPICAL at 19:32

## 2025-02-05 RX ADMIN — PENICILLIN G POTASSIUM 5 MILLION UNITS: 5000000 POWDER, FOR SOLUTION INTRAMUSCULAR; INTRAPLEURAL; INTRATHECAL; INTRAVENOUS at 06:53

## 2025-02-05 RX ADMIN — SODIUM CHLORIDE, POTASSIUM CHLORIDE, SODIUM LACTATE AND CALCIUM CHLORIDE: 600; 310; 30; 20 INJECTION, SOLUTION INTRAVENOUS at 17:41

## 2025-02-05 RX ADMIN — Medication 2 MILLI-UNITS/MIN: at 10:41

## 2025-02-05 RX ADMIN — PENICILLIN G 3 MILLION UNITS: 3000000 INJECTION, SOLUTION INTRAVENOUS at 14:37

## 2025-02-05 RX ADMIN — PENICILLIN G 3 MILLION UNITS: 3000000 INJECTION, SOLUTION INTRAVENOUS at 10:42

## 2025-02-05 RX ADMIN — WITCH HAZEL: 500 SOLUTION RECTAL; TOPICAL at 19:32

## 2025-02-05 RX ADMIN — DOCUSATE SODIUM 100 MG: 100 CAPSULE, LIQUID FILLED ORAL at 19:30

## 2025-02-05 ASSESSMENT — ACTIVITIES OF DAILY LIVING (ADL)
ADLS_ACUITY_SCORE: 27
ADLS_ACUITY_SCORE: 26
ADLS_ACUITY_SCORE: 53
ADLS_ACUITY_SCORE: 27
ADLS_ACUITY_SCORE: 26
ADLS_ACUITY_SCORE: 26
ADLS_ACUITY_SCORE: 27
ADLS_ACUITY_SCORE: 26
ADLS_ACUITY_SCORE: 27

## 2025-02-05 NOTE — PLAN OF CARE
Writer entered patient room and observed that Gisela was breathing through contractions and feeling pressure. SVE by writer 4-5/90/-2, with a bulging bag of water. Gisela reports less back pain with contractions. Requesting to get in the tub, assisted by writer and support person.

## 2025-02-05 NOTE — PROGRESS NOTES
25 y/o  at 39 weeks gest with edc 25, presents to [facility] for induction of labor. Pt denies complications with pregnancy. GBS+. Reviewed need for abx. Pt reports cramping present. +FM. Denies LOF. Monitors applied and POC discussed. Pt here with Ryan SPAIN. .Jenise Alexander RN

## 2025-02-05 NOTE — PROGRESS NOTES
Dr. Iyer notified of pt arrival for induction of labor, and pt reports of cramping. MD requested sve. Pt notified and in agreement. Sve done and was 4/75/-2, soft and midline. Vertex noted. Palpated suture and anterior fontanel (head in LOP position) [place] intact. No bloody show present. Obtained orders for intrapartum order set, including PCN for GBS positive and Oxytocin augmentation to be started after GBS treated. Orders placed. .Jenise Alexander RN

## 2025-02-05 NOTE — H&P
Maternal Admission H&P  [facility]  Date of Admission: 2025  Date of Service: 2025    Name      Gisela Moon         2000  MRN       [id number]  PCP        Dr. Cassie Iyer at [facility].    ________________________________________________________________________    Assessment and Plan:  24 year old  at 39w0d, with pregnancy complicated by a history of  birth and shortened cervix s/p vaginal progesterone, presenting for elective induction of labor. SVE 4 cm on admission.   Baby AGA.   Group B strep: positive, start IV PCN  Continue position changes  IV pitocin, titrate as able. AROM if needed  Hx of  delivery 34 weeks. Cervical shortening s/p vaginal progesterone.  Hx of iron deficiency anemia- s/p 5 doses of IV venofer. Check admission hemoglobin  Formula feed  ________________________________________________________________________    Chief Complaint: induction of labor. Indication: elective.    HPI: Gisela Moon is a 24 year old woman at 39w0d, based on an Estimated Date of Delivery: 2025. She presents for scheduled induction of labor.     Pregnancy complicated by a history of PPROM and  delivery at 34 weeks with her last delivery. She had shortened cervix and received vaginal progesterone. History of iron deficiency anemia s/p IV venofer (5 doses).     She has been having some irregular cramping but denies LOF, vaginal bleeding. Normal fetal movement.    Patient Active Problem List    Diagnosis Date Noted    Labor and delivery, indication for care 2025     Priority: Medium    Encounter for triage in pregnant patient 2025     Priority: Medium    Anemia, iron deficiency 2024     Priority: Medium    Anemia affecting pregnancy 2024     Priority: Medium    History of  delivery 10/14/2022     Priority: Medium     Shortened cervix       History of  premature rupture of  membranes (PPROM) 10/14/2022     Priority: Medium    PCOS (polycystic ovarian syndrome) 2020     Priority: Medium      OB History    Para Term  AB Living   4 1 0 1 2 1   SAB IAB Ectopic Multiple Live Births   2 0 0 0 1      # Outcome Date GA Lbr Donald/2nd Weight Sex Type Anes PTL Lv   4 Current            3 SAB 23     SAB      2  10/07/22 34w0d 07:45 / 00:29 1.99 kg (4 lb 6.2 oz) M Vag-Spont IV Y JENNIFER      Name: Rey Leyva      Apgar1: 7  Apgar5: 9   1 SAB 2021             Review of Systems Negative except what is noted in HPI.   Past Medical History:   Diagnosis Date    Infertility, female     Polycystic ovary syndrome       No past surgical history on file.Patient has no known allergies.  Family History   Problem Relation Age of Onset    Other - See Comments Mother         COVID    Heart Disease Father     Liver Disease Father     Polycystic ovary syndrome Sister     No Known Problems Brother     Hypertension Brother      Social History     Socioeconomic History    Marital status: Single     Spouse name: Not on file    Number of children: Not on file    Years of education: Not on file    Highest education level: Not on file   Occupational History    Not on file   Tobacco Use    Smoking status: Never     Passive exposure: Never    Smokeless tobacco: Never   Vaping Use    Vaping status: Never Used   Substance and Sexual Activity    Alcohol use: Never    Drug use: Never    Sexual activity: Yes     Partners: Male   Other Topics Concern    Parent/sibling w/ CABG, MI or angioplasty before 65F 55M? Yes     Comment: my dad.   Social History Narrative    Not on file     Social Drivers of Health     Financial Resource Strain: Low Risk  (2025)    Financial Resource Strain     Within the past 12 months, have you or your family members you live with been unable to get utilities (heat, electricity) when it was really needed?: No   Food Insecurity: Low Risk  (2025)    Food Insecurity      Within the past 12 months, did you worry that your food would run out before you got money to buy more?: No     Within the past 12 months, did the food you bought just not last and you didn t have money to get more?: No   Transportation Needs: Low Risk  (2/5/2025)    Transportation Needs     Within the past 12 months, has lack of transportation kept you from medical appointments, getting your medicines, non-medical meetings or appointments, work, or from getting things that you need?: No   Physical Activity: Insufficiently Active (6/9/2024)    Exercise Vital Sign     Days of Exercise per Week: 3 days     Minutes of Exercise per Session: 30 min   Stress: No Stress Concern Present (6/9/2024)    Citizen of [place] of Occupational Health - Occupational Stress Questionnaire     Feeling of Stress : Only a little   Social Connections: Moderately Integrated (6/9/2024)    Social Connection and Isolation Panel [NHANES]     Frequency of Communication with Friends and Family: Twice a week     Frequency of Social Gatherings with Friends and Family: Twice a week     Attends Nondenominational Services: 1 to 4 times per year     Active Member of Clubs or Organizations: No     Attends Club or Organization Meetings: Patient declined     Marital Status:    Interpersonal Safety: Low Risk  (2/5/2025)    Interpersonal Safety     Do you feel physically and emotionally safe where you currently live?: Yes     Within the past 12 months, have you been hit, slapped, kicked or otherwise physically hurt by someone?: No     Within the past 12 months, have you been humiliated or emotionally abused in other ways by your partner or ex-partner?: No   Housing Stability: Low Risk  (2/5/2025)    Housing Stability     Do you have housing? : Yes     Are you worried about losing your housing?: No     Prior to Admission Medication List  Medications Prior to Admission   Medication Sig Dispense Refill Last Dose/Taking    polyethylene glycol (MIRALAX) 17  GM/Dose powder Take 1 Capful by mouth daily.   2/4/2025    Prenatal Vit-Fe Fumarate-FA (PRENATAL PLUS) 27-1 MG TABS Take 1 tablet by mouth   2/4/2025      Allergies  No Known Allergies  Flu Covid Tdap RSV   09/25/2013 07/15/2021 12/19/2024      Physical Exam  Patient Vitals for the past 24 hrs:   BP Temp Temp src Resp SpO2   02/05/25 1700 108/71 97.9  F (36.6  C) Oral 18 98 %   02/05/25 1600 96/53 -- -- 16 99 %   02/05/25 1453 111/70 98  F (36.7  C) Oral 16 99 %   02/05/25 1400 103/70 97.9  F (36.6  C) Oral -- 98 %   02/05/25 1300 109/69 -- -- 16 99 %   02/05/25 1114 110/76 98.1  F (36.7  C) Oral 16 99 %   02/05/25 0730 100/68 98.1  F (36.7  C) Oral 16 98 %     Wt Readings from Last 1 Encounters:   02/02/25 68 kg (150 lb)   Prepregnancy: 53.5 kg (118 lb), BMI 23.05. Total gain: 14.5 kg (32 lb) (expected gain: 11.5 kg (25 lb)-16 kg (35 lb)).    HEART: RRR, no murmur  LUNGS: CTA bilaterally  Fetal Heart Tones: Baseline 135 bpm, variability moderate (6-25 bpm), no decelerations. Reactive.  CONTRACTIONS:  irregular on admission.  CERVIX: dilation 4 cm , 75% effaced, soft, and -2 station.  FLUID: None noted.  Fetal Presentation: vertex.  Labs    Recent Results (from the past 24 hours)   Hemoglobin    Collection Time: 02/05/25  6:10 AM   Result Value Ref Range    Hemoglobin 10.3 (L) 11.7 - 15.7 g/dL   Treponema Abs w Reflex to RPR and Titer    Collection Time: 02/05/25  6:10 AM   Result Value Ref Range    Treponema Antibody Total Nonreactive Nonreactive   Extra Green Top Tube (LAB USE ONLY)    Collection Time: 02/05/25  6:10 AM   Result Value Ref Range    Hold Specimen [facility]    Adult Type and Screen    Collection Time: 02/05/25  6:10 AM   Result Value Ref Range    ABO/RH(D) B POS     Antibody Screen Negative Negative    SPECIMEN EXPIRATION DATE [id number]       Group B Strep PCR   Date Value Ref Range Status   01/16/2025 Positive (A) Negative Final     Comment:     ALERT: Streptococcus agalactiae (Group B Streptococcus)  has a high rate of resistance to clindamycin. Therefore, clindamycin is not recommended for treatment unless susceptibility testing has been performed.      Antibody Screen   Date Value Ref Range Status   02/05/2025 Negative Negative Final     Hepatitis B Surface Antigen   Date Value Ref Range Status   07/18/2024 Nonreactive Nonreactive Final     Chlamydia trachomatis   Date Value Ref Range Status   04/14/2022 Negative Negative Final     Comment:     A negative result by transcription mediated amplification does not preclude the presence of C. trachomatis infection because results are dependent on proper and adequate collection, absence of inhibitors and sufficient rRNA to be detected.     Neisseria gonorrhoeae   Date Value Ref Range Status   04/14/2022 Negative Negative Final     Comment:     Negative for N. gonorrhoeae rRNA by transcription mediated amplification. A negative result by transcription mediated amplification does not preclude the presence of C. trachomatis infection because results are dependent on proper and adequate collection, absence of inhibitors and sufficient rRNA to be detected.     Hemoglobin   Date Value Ref Range Status   02/05/2025 10.3 (L) 11.7 - 15.7 g/dL Final        Completed by:   Cassie Iyer MD  [facility]  2/5/2025 5:56 PM   used: Not needed.

## 2025-02-05 NOTE — PROGRESS NOTES
Dr. Iyer updated on patient status. Patient has second dose of penicillin infusing and pitocin started. Plan to have Dr. Iyer assess patient after clinic, or available by phone if needed sooner.

## 2025-02-05 NOTE — PLAN OF CARE
Patient here for elective IOL at 39.0 weeks. No significant medical/OB history. Physical and OB assessments WDL. Sterile vaginal exam 4cm/75%/-2 per SANTI Burden, which is unchanged since last week. Plan to administer GBS prophylaxis IV and then begin IOL with Pitocin.

## 2025-02-06 VITALS
BODY MASS INDEX: 28.1 KG/M2 | OXYGEN SATURATION: 98 % | DIASTOLIC BLOOD PRESSURE: 70 MMHG | SYSTOLIC BLOOD PRESSURE: 110 MMHG | HEART RATE: 67 BPM | TEMPERATURE: 98 F | RESPIRATION RATE: 16 BRPM | WEIGHT: 143.9 LBS

## 2025-02-06 PROCEDURE — 99207 PR SUBSEQUENT HOSPITAL CARE FOR MOTHER, 15 MINUTES: CPT | Performed by: FAMILY MEDICINE

## 2025-02-06 PROCEDURE — 250N000013 HC RX MED GY IP 250 OP 250 PS 637: Performed by: FAMILY MEDICINE

## 2025-02-06 RX ORDER — IBUPROFEN 600 MG/1
600 TABLET, FILM COATED ORAL EVERY 6 HOURS PRN
Qty: 50 TABLET | Refills: 0 | Status: SHIPPED | OUTPATIENT
Start: 2025-02-06

## 2025-02-06 RX ORDER — ACETAMINOPHEN 500 MG
500-1000 TABLET ORAL EVERY 6 HOURS PRN
Qty: 50 TABLET | Refills: 0 | Status: SHIPPED | OUTPATIENT
Start: 2025-02-06

## 2025-02-06 RX ADMIN — DOCUSATE SODIUM 100 MG: 100 CAPSULE, LIQUID FILLED ORAL at 08:16

## 2025-02-06 ASSESSMENT — ACTIVITIES OF DAILY LIVING (ADL)
ADLS_ACUITY_SCORE: 30

## 2025-02-06 NOTE — DISCHARGE INSTRUCTIONS
Warning Signs after Having a Baby    Keep this paper on your fridge or somewhere else where you can see it.    Call your provider if you have any of these symptoms up to 12 weeks after having your baby.    Thoughts of hurting yourself or your baby  Pain in your chest or trouble breathing  Severe headache not helped by pain medicine  Eyesight concerns (blurry vision, seeing spots or flashes of light, other changes to eyesight)  Fainting, shaking or other signs of a seizure    Call 9-1-1 if you feel that it is an emergency.     The symptoms below can happen to anyone after giving birth. They can be very serious. Call your provider if you have any of these warning signs.    My provider s phone number: _______________________    Losing too much blood (hemorrhage)    Call your provider if you soak through a pad in less than an hour or pass blood clots bigger than a golf ball. These may be signs that you are bleeding too much.    Blood clots in the legs or lungs    After you give birth, your body naturally clots its blood to help prevent blood loss. Sometimes this increased clotting can happen in other areas of the body, like the legs or lungs. This can block your blood flow and be very dangerous.     Call your provider if you:  Have a red, swollen spot on the back of your leg that is warm or painful when you touch it.   Are coughing up blood.     Infection    Call your provider if you have any of these symptoms:  Fever of 100.4 F (38 C) or higher.  Pain or redness around your stitches if you had an incision.   Any yellow, white, or green fluid coming from places where you had stitches or surgery.    Mood Problems (postpartum depression)    Many people feel sad or have mood changes after having a baby. But for some people, these mood swings are worse.     Call your provider right away if you feel so anxious or nervous that you can't care for yourself or your baby.    Preeclampsia (high blood pressure)    Even if you  didn't have high blood pressure when you were pregnant, you are at risk for the high blood pressure disease called preeclampsia. This risk can last up to 12 weeks after giving birth.     Call your provider if you have:   Pain on your right side under your rib cage  Sudden swelling in the hands and face    Remember: You know your body. If something doesn't feel right, get medical help.     For informational purposes only. Not to replace the advice of your health care provider. Copyright 2020 [facility]. All rights reserved. Clinically reviewed by Fatemeh Elias, RNC-OB, MSN. Campanja 202253 - Rev 02/23.       n/a

## 2025-02-06 NOTE — PLAN OF CARE
D:  Patient admitted to [facility]-8 via wheelchair with  and support person Gracie.  A:  Bedside report received from Mckayla HANCOCK. Oriented patient and family to surroundings; call light within reach. 4 Part ID bands double checked with reporting RN.  R:  Patient and  tolerated transfer. Care assumed.           Goal Outcome Evaluation:      Plan of Care Reviewed With: patient, spouse    Overall Patient Progress: improving        Celina Lua RN on 2025 at 12:02 AM

## 2025-02-06 NOTE — PLAN OF CARE
Goal Outcome Evaluation:      Plan of Care Reviewed With: patient, spouse    Overall Patient Progress: improvingOverall Patient Progress: improving         Patient stable in postpartum period. VS and assessments WDL. Patient denies concerns/questions at this time. Education provided to notify RN of any gushes of blood or clots larger than golf ball. Patient stable during ambulation to bathroom in recovery period. Did not have epidural so able to ambulate independently from this point forward. First bag Pitocin infusing at 100mL/hr and will saline lock when finished.

## 2025-02-06 NOTE — L&D DELIVERY NOTE
OB Vaginal Delivery Note    Gisela Moon MRN# [id number]   Age: 24 year old YOB: 2000       GA: 39w0d  GP:   Labor Complications: None  Delivery QBL:  25 mL at time of note  Delivery Type: Vaginal, Spontaneous  ROM to Delivery Time: (Delivered) Minutes: 1  Mallory Weight: 2.94 kg (6 lb 7.7 oz)   1 Minute 5 Minute 10 Minute   Apgar Totals: 9   9        RAJAN ALLEN;LYUDMILA PEREZ    Delivery Details:  Gisela Moon, a 24 year old  female delivered a viable infant with apgars of 9  and 9 . Patient admitted for elective induction of labor. SVE 4 cm on admission and she received IV penicillin for 4 hours prior to starting IV pitocin. She was titrated up to 10 mU of IV pitocin and progressed to complete. SROM as patient was pushing and . Delivery was via vaginal, spontaneous to a sterile field under none anesthesia. Infant delivered in vertex left occiput anterior position. Anterior and posterior shoulders delivered without difficulty. The cord was clamped, cut twice and 3 vessels were noted. Cord blood was obtained in routine fashion with the following disposition: discard.      Cord complications: nuchal, loose, delivered through  Placenta delivered at 2025  6:23 PM. Placental disposition was Hospital disposal. Fundal massage performed and fundus found to be firm.     Episiotomy: none   Perineum, vagina, cervix were inspected, and the following lacerations were noted:   Perineal lacerations: 1st- small and hemostatic but patient requesting repair    labial laceration: right      - required 1 interrupted suture with 4-0 vicryl    Any lacerations were repaired in the usual fashion using 4-0 vicryl.    Excellent hemostasis was noted. Needle count correct. Infant and patient in delivery room in good and stable condition.        Red, Female-Gisela Fuller [[id number]]      Labor Event Times      Active labor onset date: 25 Onset time:  5:26 PM   Dilation complete date:  25 Complete time:  6:10 PM   Start pushing date/time: 2025 1812          Labor Events     labor?: No  Labor Type: Induction/Cervical ripening  Predominate monitoring during 1st stage: continuous electronic fetal monitoring     Antibiotics received during labor?: Yes  Reason for Antibiotics: GBS  Antibiotics received for GBS: Penicillin  Antibiotics Given (GBS): Greater than 4 hours prior to delivery       Rupture date/time: 25 1817   Fluid color: Clear     Induction: Oxytocin  Induction date/time: 25 1041    Cervical ripening date/time:      Indications for induction: Elective     Augmentation: Oxytocin       Delivery/Placenta Date and Time      Delivery Date: 25 Delivery Time:  6:18 PM   Placenta Date/Time: 2025  6:23 PM  Oxytocin given at the time of delivery: after delivery of baby  Delivering clinician: Cassie Iyer MD   Other personnel present at delivery:  Provider Role   Berna Tamez RN Registered Nurse   Taisha North RN Registered Nurse             Vaginal Counts       Initial count performed by 2 team members:  Two Team Members   Jaylon Tamez         Needles Suture Needles Sponges (RETIRED) Instruments   Initial counts 0 0 5    Added to count 2 1 0    Relief counts       Final counts 2 1 5            Placed during labor Accounted for at the end of labor   FSE     IUPC     Cervidil                    Final count performed by 2 team members:  Two Team Members   SANTI [address] Dr.      Final count correct?: Yes  Pre-Birth Team Brief: Complete  Post-Birth Team Debrief: Complete       Apgars    Living status: Living   1 Minute 5 Minute 10 Minute 15 Minute 20 Minute   Skin color: 1  1       Heart rate: 2  2       Reflex irritability: 2  2       Muscle tone: 2  2       Respiratory effort: 2  2       Total: 9  9       Apgars assigned by: EMELIA NORTH RN       Cord      Vessels: 3 Vessels    Cord Complications: Nuchal   Nuchal Intervention:  "delivered through         Nuchal cord description: loose nuchal cord         Cord Blood Disposition: Discard    Gases Sent?: No    Delayed cord clamping?: Yes    Cord Clamping Delay (seconds):  seconds, >120 seconds    Stem cell collection?: No           Weldon Resuscitation    Methods: None  Output in Delivery Room: Voided       Weldon Measurements      Weight: 6 lb 7.7 oz Length: 1' 7.69\"     Head circumference: 31.5 cm    Output in delivery room: Voided       Skin to Skin and Feeding Plan      Skin to skin initiation date/time: 1841    Skin to skin with: Mother  Skin to skin end date/time:            Labor Events and Shoulder Dystocia    Fetal Tracing Prior to Delivery: Category 1  Shoulder dystocia present?: Neg       Delivery (Maternal) (Provider to Complete) ([id number])    Episiotomy: None  Perineal lacerations: 1st Repaired?: Yes     Labial laceration: right Repaired?: Yes   Repair suture: 4-0 Vicryl  Genital tract inspection done: Pos       Blood Loss  Mother: Gisela Moon #[id number]     Start of Mother's Information      Delivery Blood Loss   Intrapartum & Postpartum: 25 1726 - 25 1850    Delivery Admission: 25 0536 - 25 1850         Intrapartum & Postpartum Delivery Admission    None                  End of Mother's Information  Mother: Gisela Moon #[id number]                Delivery - Provider to Complete ([id number])    Delivering clinician: Cassie Iyer MD  Delivery Type (Choose the 1 that will go to the Birth History): Vaginal, Spontaneous                         Other personnel:  Provider Role   Berna Tamez, RN Registered Nurse   Taisha North RN Registered Nurse                    Placenta    Date/Time: 2025  6:23 PM  Removal: Spontaneous  Disposition: Hospital disposal             Anesthesia    Method: None                    Presentation and Position    Presentation: Vertex    Position: Left Occiput Anterior                     Cassie Iyer MD    "

## 2025-02-06 NOTE — PLAN OF CARE
Goal Outcome Evaluation:      Plan of Care Reviewed With: patient, significant other    Overall Patient Progress: improving    Outcome Evaluation: Vital signs stable. Postaprtum assessment is within normal limits.    Patient denies pain. Fundus firm at umbilicus, lochia is light. Patient is using witch hazel pads and charles bottle. She is ambulating in room independently. Resting between cares.     Patient desires to discharge this evening after 24 hour screening for her infant.    Birth certificate and ROP completed and filed. Mood assessment completed, score of 1.     Aster Carter RN on 2/6/2025 at 2:42 PM

## 2025-02-06 NOTE — PLAN OF CARE
Goal Outcome Evaluation:      Plan of Care Reviewed With: patient, spouse    Overall Patient Progress: improving  Overall Patient Progress: improving     at 1818. AROM by MD at 1817 while infant was . First degree and right labial laceration. QBL at delivery 25 mls. Plan for routine PP cares.

## 2025-02-06 NOTE — PROGRESS NOTES
Maternal Postpartum Progress Note  M [facility]  2025 11:23 AM     ________________________________________________________________________    Assessment:    Postpartum Day #1 s/p vaginal delivery.    Principal Problem:     (normal spontaneous vaginal delivery)  Active Problems:    History of  delivery    Anemia, iron deficiency    Labor and delivery, indication for care     Plan:   - Continue current care.  - Hemoglobin 10.3 on admission, s/p 5 doses of IV venofer, last on  - check hemoglobin and ferritin at 2w postpartum visit.  - Would like to go home tonight if all baby testing is normal. Has 2w and 6w postpartum appointments scheduled.  ________________________________________________________________________    Subjective:  Patient denies headache, dizziness, dyspnea, and leg pain.  Ambulating and eating.    EPDS score   (Positive if 10 or more). Suicidality:  .    Objective:  Patient Vitals for the past 24 hrs:   BP Temp Temp src Pulse Resp SpO2   25 0756 100/60 97.9  F (36.6  C) Oral 65 18 98 %   25 113/68 98  F (36.7  C) Oral 58 16 98 %   252 107/65 98  F (36.7  C) Oral 65 16 98 %   25 114/68 -- -- -- -- --   25 112/66 -- -- -- -- --   25 121/70 98.1  F (36.7  C) Oral -- 16 --   25 192 107/64 -- -- -- -- --   25 1909 125/81 -- -- -- -- --   25 1854 126/79 -- -- -- -- --   25 1845 121/67 -- -- -- -- --   25 1826 116/59 -- -- -- -- --   25 1821 (!) 173/67 -- -- -- -- --   25 1808 115/78 -- -- -- -- --   25 1700 108/71 97.9  F (36.6  C) Oral -- 18 98 %   25 1600 96/53 -- -- -- 16 99 %   25 1453 111/70 98  F (36.7  C) Oral -- 16 99 %   25 1400 103/70 97.9  F (36.6  C) Oral -- -- 98 %   25 1300 109/69 -- -- -- 16 99 %     General: Alert, comfortable.  Heart: RRR, no murmur.  Lungs: CTA bilaterally.  Abdomen: non-distended. Uterine  fundus firm, below umbilicus.   Ext: trace edema, no calf tenderness.  No results found for this or any previous visit (from the past 24 hours).     Scheduled Meds:     docusate sodium (COLACE) capsule 100 mg, 100 mg, Oral, Daily  Continuous Meds:     No MMR Needed - Assessment: Patient does not need MMR vaccine, , Does not apply, Continuous PRN    No Tdap Needed - Assessment: Patient does not need Tdap vaccine, , Does not apply, Continuous PRN    oxytocin (PITOCIN) 30 units in 500 mL 0.9% NaCl infusion, 100-340 mL/hr, Intravenous, Continuous PRN    oxytocin (PITOCIN) 30 units in 500 mL 0.9% NaCl infusion, 340 mL/hr, Intravenous, Continuous PRN   PRN Meds:  See MAR    Completed by:   Sara Villa MD, M.D.  [facility]  2/6/2025 11:23 AM   used: Not needed.

## 2025-02-07 NOTE — PLAN OF CARE
Data: Vital signs within normal limits. Postpartum checks within normal limits - see flow record. Patient eating and drinking normally. Patient able to empty bladder independently and is up ambulating. No apparent signs of infection.  Laceration  healing well. Patient performing self cares and is able to care for infant.  Action: Patient medicated during the shift for pain and cramping. See MAR. Patient reassessed within 1 hour after each medication and pain was improved - patient stated she was comfortable. Patient education done about discharge instructions. See flow record.  Response: Positive attachment behaviors observed with infant. Support persons Gracie present.   Plan: Discharge on 02/06/2025 at 2045.    Goal Outcome Evaluation:      Plan of Care Reviewed With: patient    Overall Patient Progress: improvingOverall Patient Progress: improving

## 2025-02-07 NOTE — DISCHARGE SUMMARY
Maternal Discharge Summary  [facility]  Date of Service: 2025    Name      Gisela Moon         2000  MRN       [id number]  PCP        Dr. Cassie Iyer at [facility].  ________________________________________________________________________    Assessment:  24 year old  s/p vaginal, spontaneous at 39w0d on 2025.    Discharge Plan:   Discharge to Home. Condition at discharge: Stable.  Physical activity: Regular  Diet: Regular  Home care nurse: not covered by insurance  Lactation clinic appointment: not indicated  Contraception plan: undecided, will follow up at postpartum visit.  Follow up with Dr. Cassie Iyer at [place] [id number] in 2 weeks and 6 weeks for routine postpartum care.  Future Appointments   Date Time Provider Department Center   2025  1:00 PM Cassie Iyer MD [facility]   2025  1:00 PM Cassie Iyer MD [facility]   2025 11:45 AM Nichol Edge APRN [facility]   3/20/2025  1:00 PM Cassie Iyer MD [facility]   8.  Iron deficiency anemia s/p IV venofer last dose 25. Hemoglobin on admission 10.3, QBL 77. Consider checking hemoglobin and ferritin at 2w postpartum visit.  ________________________________________________________________________    Admission Date:  2025  Delivery Date:  2025 6:18 PM   Discharge Date:  2025    Delivery: Vaginal, Spontaneous at 39w0d    Principal Problem:     (normal spontaneous vaginal delivery)  Active Problems:    History of  delivery    Anemia, iron deficiency    Labor and delivery, indication for care    Subjective:  Patient denies headache, dizziness, dyspnea, and leg pain. Ambulating and eating. Vaginal bleeding is moderate, has been decreasing. No pain. Formula feeding.  EPDS score 1. (Positive if 10 or more). Suicidality: Never.    Discharge Exam:  Patient  Vitals for the past 24 hrs:   BP Temp Temp src Pulse Resp SpO2   02/06/25 1543 105/72 97.9  F (36.6  C) Oral 67 18 99 %   02/06/25 1240 93/56 98  F (36.7  C) Oral 70 18 96 %   02/06/25 0756 100/60 97.9  F (36.6  C) Oral 65 18 98 %   02/06/25 0332 113/68 98  F (36.7  C) Oral 58 16 98 %   02/05/25 2322 107/65 98  F (36.7  C) Oral 65 16 98 %   02/05/25 2009 114/68 -- -- -- -- --   02/05/25 1954 112/66 -- -- -- -- --   02/05/25 1941 121/70 98.1  F (36.7  C) Oral -- 16 --     General - alert, comfortable  Heart - RRR, no murmurs  Lungs - CTA bilaterally  Abdomen - fundus firm, nontender, below umbilicus  Extremities - trace edema    Scheduled Meds:     docusate sodium (COLACE) capsule 100 mg, 100 mg, Oral, Daily  Continuous Meds:     No MMR Needed - Assessment: Patient does not need MMR vaccine, , Does not apply, Continuous PRN    No Tdap Needed - Assessment: Patient does not need Tdap vaccine, , Does not apply, Continuous PRN    oxytocin (PITOCIN) 30 units in 500 mL 0.9% NaCl infusion, 100-340 mL/hr, Intravenous, Continuous PRN    oxytocin (PITOCIN) 30 units in 500 mL 0.9% NaCl infusion, 340 mL/hr, Intravenous, Continuous PRN   Recent Results (from the past 120 hours)   Hemoglobin    Collection Time: 02/05/25  6:10 AM   Result Value Ref Range    Hemoglobin 10.3 (L) 11.7 - 15.7 g/dL   Treponema Abs w Reflex to RPR and Titer    Collection Time: 02/05/25  6:10 AM   Result Value Ref Range    Treponema Antibody Total Nonreactive Nonreactive   Extra Green Top Tube (LAB USE ONLY)    Collection Time: 02/05/25  6:10 AM   Result Value Ref Range    Hold Specimen C    Adult Type and Screen    Collection Time: 02/05/25  6:10 AM   Result Value Ref Range    ABO/RH(D) B POS     Antibody Screen Negative Negative    SPECIMEN EXPIRATION DATE [id number]       Discharge Medications: See medication reconciliation.     Completed by:   Sara Villa MD  [facility]  2/6/2025 7:28 PM   used:  Not needed.

## 2025-02-13 ENCOUNTER — PRENATAL OFFICE VISIT (OUTPATIENT)
Dept: FAMILY MEDICINE | Facility: CLINIC | Age: 25
End: 2025-02-13
Payer: COMMERCIAL

## 2025-02-13 VITALS
HEART RATE: 64 BPM | DIASTOLIC BLOOD PRESSURE: 64 MMHG | HEIGHT: 60 IN | RESPIRATION RATE: 16 BRPM | TEMPERATURE: 97.8 F | WEIGHT: 136.1 LBS | OXYGEN SATURATION: 100 % | SYSTOLIC BLOOD PRESSURE: 96 MMHG | BODY MASS INDEX: 26.72 KG/M2

## 2025-02-13 DIAGNOSIS — N81.11 CYSTOCELE, MIDLINE: ICD-10-CM

## 2025-02-13 ASSESSMENT — EDINBURGH POSTNATAL DEPRESSION SCALE (EPDS)
THE THOUGHT OF HARMING MYSELF HAS OCCURRED TO ME: NEVER
THINGS HAVE BEEN GETTING ON TOP OF ME: NO, I HAVE BEEN COPING AS WELL AS EVER
I HAVE LOOKED FORWARD WITH ENJOYMENT TO THINGS: AS MUCH AS I EVER DID
TOTAL SCORE: 0
I HAVE BEEN SO UNHAPPY THAT I HAVE HAD DIFFICULTY SLEEPING: NOT AT ALL
I HAVE BLAMED MYSELF UNNECESSARILY WHEN THINGS WENT WRONG: NO, NEVER
I HAVE FELT SAD OR MISERABLE: NO, NOT AT ALL
I HAVE FELT SCARED OR PANICKY FOR NO GOOD REASON: NO, NOT AT ALL
I HAVE BEEN ABLE TO LAUGH AND SEE THE FUNNY SIDE OF THINGS: AS MUCH AS I ALWAYS COULD
I HAVE BEEN SO UNHAPPY THAT I HAVE BEEN CRYING: NO, NEVER
I HAVE BEEN ANXIOUS OR WORRIED FOR NO GOOD REASON: NO, NOT AT ALL

## 2025-02-13 NOTE — PROGRESS NOTES
Assessment/Plan:     Gisela was seen today for post partum .    Diagnoses and all orders for this visit:    Routine postpartum follow-up  -     Physical Therapy  Referral; Future    Cystocele, midline: Mild, grade 1. Offered reassurance, no other signs of pelvic prolapse. Start pelvic floor strengthening execises, refer to pelvic PT. Avoid running or strenuous activity until cleared at her next visit. Reviewed importance of managing constipation.  -     Physical Therapy  Referral; Future        Anemia:  Anemia during pregnancy, s/p IV venofer. Minimal blood loss at delivery- will recheck at postpartum visit  Breastfeeding/Bottle feeding:  Pt is breast and formula feeding baby  Depression:   See [facility] Depresion survey  Exercise:   Encouraged increasing exercise based on how she is feeling.  Healthcare maintenance:   Up to date  Immunizations:    Immunizations needed; declines today               Subjective:      Gisela Moon is a 24 year old female who presents for a postpartum visit.   She is 1 week postpartum following a spontaneous vaginal delivery.   I have fully reviewed the prenatal and intrapartum course.   Delivery notes below  Postpartum course has been Unremarkable.  Baby's course has been Uncomplicated.  Periods:  Still bleeding Patient's last menstrual period was 2024 (exact date).   Bowel or bladder concerns: urinary frequency, feels a bulge and wonders if this is her cervix   Patient has not resumed intercourse.    [place]  Depression Scale: see flowsheet    Last hgb on file:    Lab Results   Component Value Date    HGB 10.3 (L) 2025        The following portions of the patient's history were reviewed and updated as appropriate: allergies, current medications and problem list     OB History    Para Term  AB Living   4 2 1 1 2 2   SAB IAB Ectopic Multiple Live Births   2 0 0 0 2      # Outcome Date GA Lbr Donald/2nd Weight Sex Type Anes PTL Lv    4 Term 25 39w0d 00:44 / 00:08 2.94 kg (6 lb 7.7 oz) F Vag-Spont None N JENNIFER      Name: Gely Leyva      Apgar1: 9  Apgar5: 9   3 SAB 23     SAB      2  10/07/22 34w0d 07:45 / 00:29 1.99 kg (4 lb 6.2 oz) M Vag-Spont IV Y JENNIFER      Name: Rey Leyva      Apgar1: 7  Apgar5: 9   1 SAB 2021             Information for the patient's :  Gely Leyva [[id number]]   Gely Leyva    Objective:      BP 96/64   Pulse 64   Temp 97.8  F (36.6  C) (Oral)   Resp 16   Ht 1.524 m (5')   Wt 61.7 kg (136 lb 1.6 oz)   LMP 2024 (Exact Date)   SpO2 100%   Breastfeeding Yes   BMI 26.58 kg/m      Physical Exam:  General Appearance: Alert, cooperative, no distress, appears stated age  Pelvic: Normally developed genitalia, well healing laceration. Cystocele, grade 1, midline.

## 2025-02-18 ENCOUNTER — MYC MEDICAL ADVICE (OUTPATIENT)
Dept: FAMILY MEDICINE | Facility: CLINIC | Age: 25
End: 2025-02-18
Payer: COMMERCIAL

## 2025-02-18 DIAGNOSIS — L91.0 KELOID SCAR: Primary | ICD-10-CM

## 2025-02-24 ENCOUNTER — DOCUMENTATION ONLY (OUTPATIENT)
Dept: PEDIATRICS | Facility: CLINIC | Age: 25
End: 2025-02-24
Payer: COMMERCIAL

## 2025-02-24 NOTE — PROGRESS NOTES
"[facility] Lactation Visit    Assessment:    Breastfeeding problem  - Lactation  Referral  - cholecalciferol (D-VI-SOL, VITAMIN D3) 10 mcg/mL (400 units/mL) LIQD liquid  Dispense: 50 mL; Refill: 11    Ankyloglossia     Gely Leyva is a 2 week old old female infant born at 39 weeks and 0 days via vaginal delivery on 2/5/2025 here for lactation support.    Gely Leyva is doing well. Gely Leyva has gained 24.6 oz since their last visit 14 days ago; approximately 1.7 oz per day.  She is gaining adequate weight. Mom reports difficulty latching on the left side due to inverted nipple. Infant was able to sustain a latch on the left side with lips flanged and audible swallows. Infant was able to transfer 1.3 oz from the breast today. She did have a recent feeding about 40 minutes before the visit where she took 1.5 oz. Mom would like to only continue to breast-feed for the next 2 weeks per her preference. Discussed milk storage and pumping plan.    Plan:  Feeding plan: continue to offer breast milk or formula based on infant cues. She should nurse/feed at least 8-12 times a day.  When latching Gely Leyva, make sure head, neck, and body are aligned an facing you. Support breast with \"sandwich\" hold or \"C\" hold while infant is breast-feeding. To obtain a deeper latch, aim the tip of the nipple to infant's roof of the mouth (aim for the nose). Ensure lips are flanged out. If having difficulty, wait for wide gape and gently apply downward pressure to the infant's chin. If latch is painful or lips are pursed in, unlatch Gely Leyva and reposition. Make sure to stimulate Gely Leyva to actively nurse. Listen for swallows. If swallows are less frequent, stimulate infant by tickling her hands or feet. You may also wipe a cool wash cloth on her face or hand express your breast for milk. Also skin-to-skin and undressing Gely Leyva down to her diaper can be helpful. Burp Gely " DEISY Leyva before switching sides and burp again after breast-feeding. Keep Gely Leyva in upright position for about 10-15 minutes after feeding, before laying her flat on her back.    A typical feeding is 10-15 minutes on each side; total of 20-30 minutes per breast-feeding session. Offer both sides for each nursing session.      Supplementation: a typical feeding volume at this age is about 2-3 oz per feeding. Gely was able to transfer 1.3 oz from the breast today. This is after a recent bottle-feeding of 1.5 oz less than 1 hour ago. Given overall good weight gain, supplement only as needed.      Pumping plan: to help increase your milk production, continue to stimulate by breast-feeding or pumping every 2-3 hours. You can also try garlic and oatmeal in your diet.     Moringa can also be helpful. It is a super food/vegetable that can help increase milk production. You can explore [employer] for some moringa options.    Continue to monitor output, expect at least 6 wet diapers per day and 2-4 stools in a day. If Gely Leyva has less than the recommended wet diapers, please call us.     Gely Leyva should start Vitamin D 400 international units, once daily, when he is back to birthweight or starts gain weight.    Follow up lactation in 1-2 weeks as needed.    Maternal nipple care:   Best way to help decrease nipple soreness is to obtain a deep latch. When you pump, nipples should not touch the sides of the flange. Apply lanolin or coconut oil after breast-feeding or pumping. Wipe away left over residue before next breast-feeding or pumping. Allow nipples to air dry as much as possible to help stimulate healing. If mother is experiencing persistent breast pain, flu-like symptoms, localized breast tenderness/redness/warmness, or fevers, please contact mother's primary care provider or Obstetrician/midwife for further evaluation.    Return to clinic sooner or call clinic sooner for any worsening symptoms  (inconsolability, fever greater than 100.4F, less wet diapers, no stools, sleepiness, difficulty feeding, abdominal distention).    For further lactation concerns, please call [phone]         ____________________________________________________________________  SUBJECTIVE:     Gely Leyva is a 2 week old old female infant born at Gestational Age: 39w0d via Vaginal, Spontaneous delivery on 2025 at 6:18 PM here for lactation support. This patient is accompanied in the office by her mother and father.    Concerns: having difficulty latching. Nursing about 2-3 times a day. Baby latches on for about 10 minutes. Mainly bottle-feeding with breast milk and formula. Baby takes about 2.5 oz each feeding every 2-3 hours.     Baby has about 12 wet diapers and 10 stools per day. Stools are mustard in color.    Mom is also pumping about 7-5 per day and gets about 3-5 oz per pumping session. Mom noticed her breasts grew larger and areolas darkened during pregnancy and she noticed primary engorgement when her milk came in on day 7.    Breastfeeding Goals: increase milk production and continue breast-milk via bottle. Mom plans to nurse for about 1 month. Mom would like to increase milk production to freeze milk and then stop pumping/nursing all together in 1 month.    Previous Breastfeeding Experience: nursed/pumped for 2 months for first child    Breast-surgery: none    Maternal medications: none. Has history of PCOS. No hypertension, diabetes, or thyroid disease  Infant medications: none.      Patient Active Problem List    Diagnosis Date Noted    Term  delivered vaginally, current hospitalization 2025     Priority: Medium     of maternal carrier of group B Streptococcus, mother treated prophylactically 2025     Priority: Medium     No results found for any visits on 25.    Current Outpatient Medications:     cholecalciferol (D-VI-SOL, VITAMIN D3) 10 mcg/mL (400 units/mL) LIQD liquid,  Take 1 mL (10 mcg) by mouth daily., Disp: 50 mL, Rfl: 11  No past medical history on file.  No past surgical history on file.  No family history on file.    Primary care provider: Cassie Iyer    OBJECTIVE:    Mother:   Nipples are everted (left was slightly inverted, but everted with stimulation), the areola is compressible, the breast is soft and full.     Sore nipples: none   Maternal pain: none    Maternal depression screening: Doing well    Infant:   Vitals:    02/24/25 1402   Pulse: 160   Resp: 76   SpO2: 99%   Weight: 8 lb 0.7 oz (3.649 kg)          Weight:   Birthweight: 6 lbs 7.7 oz  Today's weight: 8 lbs .7 oz    24% from birth weight.    Amount of milk transferred from LEFT side: 0.4 oz  Amount of milk transferred from RIGHT side: 0.9 oz    Total transfer: 1.3 oz    Feeding assessment:     Infant can draw gloved finger into mouth. Infant demonstrated a coordinated suck. Infant palate is intact, tongue over gums, visible anterior lingual frenulum.   Infant can hold suction with tongue while at the breast. Infant did not need frequent stimulation at the breast.     Alignment: Infant's head was aligned with its trunk. Infant did face mother. Baby was in cross-cradle position today. Discussed importance of infant ventral positioning to obtain a deeper latch.     Areolar Grasp: Infant was assisted by gently applying downward pressure to the chin to open mouth wide. Infant's lips were not pursed. Infant's lips did flange outward. Tongue was visible just barely over bottom lip. Infant had complete seal.     Areolar Compression: Infant made rhythmic motion. There were no clicking or smacking sounds. There was no severe nipple discomfort.  Nipples appeared round after feeding.    Audible swallowing: Infant made quiet sounds of swallowing. There was an increase in frequency after milk ejection reflex.     PHYSICAL EXAM    Gen: Alert, no acute distress.   Head: Anterior and posterior fontanelles are flat and soft.  "  Eyes: No eye drainage. Sclera clear. .   Ears: Pinna appear well-formed. No pits.   Nose: nares patent. No nasal congestion. No nasal flaring.  Mouth: Oral mucosa moist. Tongue midline (tongue elevation adequate when crying, good lateralization). Frenulum palpable and visible. Very mild \"heart-shaped\" tongue. Tongue able to extend pass lower gumline. Lips closed at rest.   Neck: Clavicles intact bilaterally.  Lungs: Clear to auscultation bilaterally.   Cardiac: Regular regular rate and rhythm, S1S2, no murmurs. Brachial and femoral pulses +2 and equal.  Abdomen: Soft, nontender, bowel sounds present, no hepatosplenomegaly or mass palpable. Umbilicus dry with no erythema or drainage.   : Sourav stage 1 female genitalia  Skin: Intact. Dry. No rash. No jaundice.   Musculoskeletal: equal movements of upper and lower extremities. Negative Ortolani and Mckeon maneuver.  Neuro: Appropriate muscle tone.    The visit lasted a total of 40 minutes doing history taking, review of chart, assessment, discussion of feeding plan, care coordination, and documentation.    Completed by:   Jessica Parekh, APRN, CPNP, IBCLC  [facility]  [facility]  2/24/2025, 2:10 PM  "

## 2025-02-27 ENCOUNTER — OFFICE VISIT (OUTPATIENT)
Dept: SURGERY | Facility: CLINIC | Age: 25
End: 2025-02-27
Payer: COMMERCIAL

## 2025-02-27 VITALS
DIASTOLIC BLOOD PRESSURE: 81 MMHG | HEIGHT: 60 IN | OXYGEN SATURATION: 99 % | BODY MASS INDEX: 25.5 KG/M2 | WEIGHT: 129.9 LBS | HEART RATE: 86 BPM | SYSTOLIC BLOOD PRESSURE: 125 MMHG

## 2025-02-27 DIAGNOSIS — K60.2 ANAL FISSURE: Primary | ICD-10-CM

## 2025-02-27 ASSESSMENT — PAIN SCALES - GENERAL: PAINLEVEL_OUTOF10: NO PAIN (0)

## 2025-02-27 NOTE — PROGRESS NOTES
Colon and Rectal Surgery Follow-Up Clinic Note    RE: Gisela Moon  : 2000  IMER: 2025    Gisela Moon is a very pleasant 24 year old female here for follow-up of anal fissure.    Interval history:   I saw Gisela while she was pregnant with an anal fissure. She gave birth earlier this month and has been taking Miralax. No further pain or bleeding with bowel movements.      Physical Examination:   /81 (BP Location: Left arm, Patient Position: Sitting, Cuff Size: Adult Regular)   Pulse 86   Ht 5'   Wt 129 lb 14.4 oz   LMP 2024 (Exact Date)   SpO2 99%   Breastfeeding Yes   BMI 25.37 kg/m    General: Alert, oriented, in no acute distress, sitting comfortably  HEENT: Mucous membranes moist    Assessment/Plan: 24 year old female with healed anal fissure. Currently asymptomatic. Continue to keep stools soft and advised increased water intake and daily Miralax while breast feeding and daily fiber supplement long term. Can follow up as needed.    Medical history:  Past Medical History:   Diagnosis Date    Infertility, female     Polycystic ovary syndrome        Surgical history:  No past surgical history on file.    Problem list:    Patient Active Problem List    Diagnosis Date Noted    Labor and delivery, indication for care 2025     Priority: Medium     (normal spontaneous vaginal delivery) 2025     Priority: Medium    Encounter for triage in pregnant patient 2025     Priority: Medium    Anemia, iron deficiency 2024     Priority: Medium    Anemia affecting pregnancy 2024     Priority: Medium    History of  delivery 10/14/2022     Priority: Medium     Shortened cervix       History of  premature rupture of membranes (PPROM) 10/14/2022     Priority: Medium    PCOS (polycystic ovarian syndrome) 2020     Priority: Medium       Medications:  Current Outpatient Medications   Medication Sig Dispense Refill    acetaminophen (TYLENOL) 500 MG  tablet Take 1-2 tablets (500-1,000 mg) by mouth every 6 hours as needed for fever or pain. 50 tablet 0    ibuprofen (ADVIL/MOTRIN) 600 MG tablet Take 1 tablet (600 mg) by mouth every 6 hours as needed for moderate pain or fever. 50 tablet 0    polyethylene glycol (MIRALAX) 17 GM/Dose powder Take 1 Capful by mouth daily.         Allergies:  No Known Allergies    Family history:  Family History   Problem Relation Age of Onset    Other - See Comments Mother         COVID    Heart Disease Father     Liver Disease Father     Polycystic ovary syndrome Sister     No Known Problems Brother     Hypertension Brother        Social history:  Social History     Tobacco Use    Smoking status: Never     Passive exposure: Never    Smokeless tobacco: Never   Substance Use Topics    Alcohol use: Never     Marital status: single.    Nursing Notes:   Erinn Kebede  2/27/2025 11:55 AM  Signed  Chief Complaint   Patient presents with    Follow Up     fissure       Vitals:    02/27/25 1152   BP: 125/81   BP Location: Left arm   Patient Position: Sitting   Cuff Size: Adult Regular   Pulse: 86   SpO2: 99%   Weight: 129 lb 14.4 oz   Height: 5'       Body mass index is 25.37 kg/m .    DALI Burrell    10 minutes spent on the date of encounter performing chart review, history and exam, documentation and further activities as noted above     Nichol Hassan NP-C  Colon and Rectal Surgery  [facility]

## 2025-02-27 NOTE — LETTER
2025       RE: Gisela Moon  [address] [place] [address]     Dear Colleague,    Thank you for referring your patient, Gisela Moon, to the [facility] COLON AND [facility]CTAL SURGERY [facility] [place] [facility]. Please see a copy of my visit note below.    Colon and Rectal Surgery Follow-Up Clinic Note    RE: Gisela Moon  : 2000  IMER: 2025    Gisela Moon is a very pleasant 24 year old female here for follow-up of anal fissure.    Interval history:   I saw Gisela while she was pregnant with an anal fissure. She gave birth earlier this month and has been taking Miralax. No further pain or bleeding with bowel movements.      Physical Examination:   /81 (BP Location: Left arm, Patient Position: Sitting, Cuff Size: Adult Regular)   Pulse 86   Ht 5'   Wt 129 lb 14.4 oz   LMP 2024 (Exact Date)   SpO2 99%   Breastfeeding Yes   BMI 25.37 kg/m    General: Alert, oriented, in no acute distress, sitting comfortably  HEENT: Mucous membranes moist    Assessment/Plan: 24 year old female with healed anal fissure. Currently asymptomatic. Continue to keep stools soft and advised increased water intake and daily Miralax while breast feeding and daily fiber supplement long term. Can follow up as needed.    Medical history:  Past Medical History:   Diagnosis Date     Infertility, female      Polycystic ovary syndrome        Surgical history:  No past surgical history on file.    Problem list:    Patient Active Problem List    Diagnosis Date Noted     Labor and delivery, indication for care 2025     Priority: Medium      (normal spontaneous vaginal delivery) 2025     Priority: Medium     Encounter for triage in pregnant patient 2025     Priority: Medium     Anemia, iron deficiency 2024     Priority: Medium     Anemia affecting pregnancy 2024     Priority: Medium     History of  delivery  10/14/2022     Priority: Medium     Shortened cervix        History of  premature rupture of membranes (PPROM) 10/14/2022     Priority: Medium     PCOS (polycystic ovarian syndrome) 2020     Priority: Medium       Medications:  Current Outpatient Medications   Medication Sig Dispense Refill     acetaminophen (TYLENOL) 500 MG tablet Take 1-2 tablets (500-1,000 mg) by mouth every 6 hours as needed for fever or pain. 50 tablet 0     ibuprofen (ADVIL/MOTRIN) 600 MG tablet Take 1 tablet (600 mg) by mouth every 6 hours as needed for moderate pain or fever. 50 tablet 0     polyethylene glycol (MIRALAX) 17 GM/Dose powder Take 1 Capful by mouth daily.         Allergies:  No Known Allergies    Family history:  Family History   Problem Relation Age of Onset     Other - See Comments Mother         COVID     Heart Disease Father      Liver Disease Father      Polycystic ovary syndrome Sister      No Known Problems Brother      Hypertension Brother        Social history:  Social History     Tobacco Use     Smoking status: Never     Passive exposure: Never     Smokeless tobacco: Never   Substance Use Topics     Alcohol use: Never     Marital status: single.    Nursing Notes:   Erinn Kebede  2025 11:55 AM  Signed  Chief Complaint   Patient presents with     Follow Up     fissure       Vitals:    25 1152   BP: 125/81   BP Location: Left arm   Patient Position: Sitting   Cuff Size: Adult Regular   Pulse: 86   SpO2: 99%   Weight: 129 lb 14.4 oz   Height: 5'       Body mass index is 25.37 kg/m .    DALI Burrell    10 minutes spent on the date of encounter performing chart review, history and exam, documentation and further activities as noted above     LILLIANA FontanaC  Colon and Rectal Surgery  [facility]      Again, thank you for allowing me to participate in the care of your patient.      Sincerely,    FILIPPO Fontana CNP

## 2025-02-27 NOTE — NURSING NOTE
Chief Complaint   Patient presents with    Follow Up     fissure       Vitals:    02/27/25 1152   BP: 125/81   BP Location: Left arm   Patient Position: Sitting   Cuff Size: Adult Regular   Pulse: 86   SpO2: 99%   Weight: 129 lb 14.4 oz   Height: 5'       Body mass index is 25.37 kg/m .    Erinn Kebede, EMT

## 2025-03-17 ENCOUNTER — THERAPY VISIT (OUTPATIENT)
Dept: PHYSICAL THERAPY | Facility: REHABILITATION | Age: 25
End: 2025-03-17
Payer: COMMERCIAL

## 2025-03-17 DIAGNOSIS — R39.15 URINARY URGENCY: ICD-10-CM

## 2025-03-17 DIAGNOSIS — N81.11 CYSTOCELE, MIDLINE: ICD-10-CM

## 2025-03-17 DIAGNOSIS — R35.0 URINARY FREQUENCY: Primary | ICD-10-CM

## 2025-03-17 PROCEDURE — 97161 PT EVAL LOW COMPLEX 20 MIN: CPT | Mod: GP | Performed by: PHYSICAL THERAPIST

## 2025-03-17 PROCEDURE — 97110 THERAPEUTIC EXERCISES: CPT | Mod: GP | Performed by: PHYSICAL THERAPIST

## 2025-03-17 NOTE — PROGRESS NOTES
PHYSICAL THERAPY EVALUATION  Type of Visit: Evaluation     Fall Risk Screen:  Fall screen completed by: PT  Have you fallen 2 or more times in the past year?: No  Have you fallen and had an injury in the past year?: No  Is patient a fall risk?: No    Subjective         Presenting condition or subjective complaint: Pelvic weakness  Date of onset: 02/05/25    Relevant medical history:   none  Dates & types of surgery:  none    Prior diagnostic imaging/testing results:       Prior therapy history for the same diagnosis, illness or injury: No      Prior Level of Function  Transfers: Independent  Ambulation: Independent  ADL: Independent      Living Environment  Social support: With a significant other or spouse   Type of home: House   Stairs to enter the home: Yes 3 Is there a railing: No     Ramp: No   Stairs inside the home: Yes 7 Is there a railing: Yes     Help at home: Self Cares (home health aide/personal care attendant, family, etc); Home management tasks (cooking, cleaning); Medication and/or finances; Home and Yard maintenance tasks  Equipment owned:       Employment: No   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 Pt biggest concern is the feeling of pelvic floor pressure and bulge constantly since 2/5/25 when 2nd child born  Feels bladder filling: Yes  Triggers for feeling of inability to wait to go to the bathroom: No    How long can you wait to urinate: 30 mins  Gets up at night to urinate: Yes 2  Can stop the flow of urine when urinating: Yes  Volume of urine usually released: Medium   Other issues: Dribbling after urinating 25%,  Number of bladder infections in last 12 months:    Fluid intake  per day: 50 oz      Medications taken for bladder: No     Activities causing urine leak:      Amount of urine typically leaked:    Pads used to help with leaking: No        Bowel History:  Frequency of bowel movement: Every 2-3 days  Consistency of stool: Soft-formed    Ignores the urge to defecate: No  Other bowel issues:    Length of time spent trying to have a bowel movement:      Sexual Function History:  Sexual orientation: Straight    Sexually active: Yes  Lubrication used: No No  Pelvic pain: Walking    Pain or difficulty with orgasms/erection/ejaculation: No    State of menopause: Perimenopause (have not gone through menopause yet)  Hormone medications: No      Are you currently pregnant: No  Number of previous pregnancies: 4  Number of deliveries: 2  If you have delivered before, did you have any of these issues during delivery: Tearing; Vaginal delivery  Have you been diagnosed with pelvic prolapse or abdominal separation: Yes  Do you get regular exercise: No  Have you tried pelvic floor strengthening exercises for 4 weeks: No  Do you have any history of trauma that is relevant to your care that you d like to share: No      Discussed reason for referral regarding pelvic health needs and external/internal pelvic floor muscle examination with patient/guardian.  Opportunity provided to ask questions and verbal consent for assessment and intervention was given.    PAIN: No pelvic or back pain    POSTURE: Pelvis and trochanter level    LUMBAR SCREEN: Able to flex trunk to 3 inches finger to floor- no problem    Functional Strength Testing: Able to heel and toe walk without issue.    PELVIC/SI SCREEN:  PSIS level in standing, neg Gaenslen's    PELVIC EXAM  External Manual Inspection:  Palpated medial to ischial tuberosity through clothing- normal pelvic resting tone.   Pt able to contract pelvic floor with slight lift for 1 reps of 10 seconds, and then contract with no lift for 10 seconds for 3 more reps. Pt  able to continue contractions but at less than 50% of contraction noted initially.    Internal Digital Palpation:  Per Vagina:  Deferred due to time.      Pelvic Organ Prolapse:   Deferred due to time    ABDOMINAL ASSESSMENT  Diastasis Rectus Abdominis (ALVARO):  2 fingerwidths at umbilicus only    Abdominal Activation/Strength:   Pt unable to control pelvic foll/rock during TA with SLR on either side. Pt better able to control leg lift in hooklying but still does poor TA set.    DERMATOMES: WNL      Assessment & Plan   CLINICAL IMPRESSIONS  Medical Diagnosis: Urinary frequency/urgency    Treatment Diagnosis: Urinary frequency/urgency, midline cystocele   Impression/Assessment: Patient is a 24 year old female with pelvic floor pressure/bulge sensation an urgency complaints.  The following significant findings have been identified: Decreased strength, Impaired muscle performance, and Decreased activity tolerance. These impairments interfere with their ability to perform self care tasks and recreational activities as compared to previous level of function.     Clinical Decision Making (Complexity):  Clinical Presentation: Stable/Uncomplicated  Clinical Presentation Rationale: based on medical and personal factors listed in PT evaluation  Clinical Decision Making (Complexity): Low complexity    PLAN OF CARE  Treatment Interventions:  Interventions: Manual Therapy, Neuromuscular Re-education, Therapeutic Exercise, Self-Care/Home Management    Long Term Goals     PT Goal 1  Goal Identifier: Urinary frequency/urgency  Goal Description: Pt will be able to comfortably delay voiding to 2 hours without urinary urgency  Rationale: to maximize safety and independence with performance of ADLs and functional tasks;to maximize safety and independence with self cares  Target Date: 06/15/25  PT Goal 2  Goal Identifier: Pelvic floor pressure/bulge  Goal Description: Pt will decrease sensation of perineal/pelvic floor bulge or pressure  from 100% of the time to 50% or less in 12 weeks  Rationale: to maximize safety and independence with performance of ADLs and functional tasks;to maximize safety and independence with self cares  Target Date: 06/15/25      Frequency of Treatment: 1x/wk  Duration of Treatment: 16 weeks    Recommended Referrals to Other Professionals: none  Education Assessment:   Learner/Method: Patient;No Barriers to Learning    Risks and benefits of evaluation/treatment have been explained.   Patient/Family/caregiver agrees with Plan of Care.     Evaluation Time:     PT Eval, Low Complexity Minutes (60268): 30 (Unable to find pt for 10 minutes- she had gone to bathroom)       Signing Clinician: Lynette Beach, PT        [facility]                                                                                   OUTPATIENT PHYSICAL THERAPY      PLAN OF TREATMENT FOR OUTPATIENT REHABILITATION   Patient's Last Name, First Name, Gisela Russo    YOB: 2000   Provider's Name   [facility]   Medical Record No.  [id number]     Onset Date: 02/05/25  Start of Care Date: 03/17/25     Medical Diagnosis:  Urinary frequency/urgency      PT Treatment Diagnosis:  Urinary frequency/urgency, midline cystocele Plan of Treatment  Frequency/Duration: 1x/wk/ 16 weeks    Certification date from 03/17/25 to 06/15/25         See note for plan of treatment details and functional goals     Lynette Beach, PT                         I CERTIFY THE NEED FOR THESE SERVICES FURNISHED UNDER        THIS PLAN OF TREATMENT AND WHILE UNDER MY CARE     (Physician attestation of this document indicates review and certification of the therapy plan).              Referring Provider:  Cassie Iyer    Initial Assessment  See Epic Evaluation- Start of Care Date: 03/17/25

## 2025-03-20 ENCOUNTER — PRENATAL OFFICE VISIT (OUTPATIENT)
Dept: FAMILY MEDICINE | Facility: CLINIC | Age: 25
End: 2025-03-20
Payer: COMMERCIAL

## 2025-03-20 VITALS
RESPIRATION RATE: 16 BRPM | WEIGHT: 128 LBS | BODY MASS INDEX: 25.13 KG/M2 | SYSTOLIC BLOOD PRESSURE: 101 MMHG | TEMPERATURE: 97.8 F | DIASTOLIC BLOOD PRESSURE: 68 MMHG | HEIGHT: 60 IN | HEART RATE: 69 BPM | OXYGEN SATURATION: 98 %

## 2025-03-20 PROBLEM — Z36.89 ENCOUNTER FOR TRIAGE IN PREGNANT PATIENT: Status: RESOLVED | Noted: 2025-02-02 | Resolved: 2025-03-20

## 2025-03-20 PROBLEM — O99.019 ANEMIA AFFECTING PREGNANCY: Status: RESOLVED | Noted: 2024-12-20 | Resolved: 2025-03-20

## 2025-03-20 ASSESSMENT — EDINBURGH POSTNATAL DEPRESSION SCALE (EPDS)
I HAVE BEEN SO UNHAPPY THAT I HAVE HAD DIFFICULTY SLEEPING: NOT AT ALL
I HAVE BLAMED MYSELF UNNECESSARILY WHEN THINGS WENT WRONG: NO, NEVER
I HAVE FELT SCARED OR PANICKY FOR NO GOOD REASON: NO, NOT MUCH
I HAVE LOOKED FORWARD WITH ENJOYMENT TO THINGS: AS MUCH AS I EVER DID
THINGS HAVE BEEN GETTING ON TOP OF ME: NO, I HAVE BEEN COPING AS WELL AS EVER
I HAVE BEEN SO UNHAPPY THAT I HAVE BEEN CRYING: NO, NEVER
I HAVE BEEN ABLE TO LAUGH AND SEE THE FUNNY SIDE OF THINGS: AS MUCH AS I ALWAYS COULD
I HAVE BEEN ANXIOUS OR WORRIED FOR NO GOOD REASON: HARDLY EVER
TOTAL SCORE: 2
THE THOUGHT OF HARMING MYSELF HAS OCCURRED TO ME: NEVER
I HAVE FELT SAD OR MISERABLE: NO, NOT AT ALL

## 2025-03-20 ASSESSMENT — PAIN SCALES - GENERAL: PAINLEVEL_OUTOF10: NO PAIN (0)

## 2025-03-20 NOTE — PROGRESS NOTES
Assessment/Plan:     Gisela was seen today for post partum exam.    Diagnoses and all orders for this visit:    Routine postpartum follow-up        Anemia:  Declines recheck of hemoglobin today, asymptomatic.   Breastfeeding/Bottle feeding:  Pt is bottle feeding her baby- feels she has tongue tie   Contraception:   Uncertain- considering mirena IUD vs copper IUD- will contact me when she is interested in scheduling- declines today. Discussed contraception options.  Depression:   See [facility] Depresion survey  Exercise:   Encouraged increasing exercise based on how she is feeling.  Healthcare maintenance:   Up to date  Immunizations:    Immunizations needed; declines today               Subjective:      Gisela Moon is a 24 year old female who presents for a postpartum visit.   She is 6 weeks postpartum following a spontaneous vaginal delivery.   I have fully reviewed the prenatal and intrapartum course.   Delivery notes below  Postpartum course has been Unremarkable.  She has seen pelvic PT for urinary frequency symptoms, mild cystocele.   Baby's course has been Complicated by tongue tie .  Periods:  had period after she stopped breastfeeding Patient's last menstrual period was 2024 (exact date).   Bowel or bladder concerns: +urinary frequency, denies leakage of urine  Patient has resumed intercourse.    [place]  Depression Scale: see flowsheet. She endorses some anxiety and mood symptoms but feels she is coping well- declines referral for therapy or medication.    Last hgb on file:    Lab Results   Component Value Date    HGB 10.3 (L) 2025        The following portions of the patient's history were reviewed and updated as appropriate: allergies, current medications and problem list     OB History    Para Term  AB Living   4 2 1 1 2 2   SAB IAB Ectopic Multiple Live Births   2 0 0 0 2      # Outcome Date GA Lbr Donald/2nd Weight Sex Type Anes PTL Lv   4 Term 25 39w0d  "00:44 / 00:08 2.94 kg (6 lb 7.7 oz) F Vag-Spont None N JENNIFER      Name: Gely Leyva      Apgar1: 9  Apgar5: 9   3 SAB 23     SAB      2  10/07/22 34w0d 07:45 / 00:29 1.99 kg (4 lb 6.2 oz) M Vag-Spont IV Y JENNIFER      Name: Rey Leyva      Apgar1: 7  Apgar5: 9   1 SAB 2021             Information for the patient's :  Gely Leyva [[id number]]   Gely Leyva    Objective:      /68 (BP Location: Right arm, Patient Position: Sitting, Cuff Size: Adult Regular)   Pulse 69   Temp 97.8  F (36.6  C) (Oral)   Resp 16   Ht 1.53 m (5' 0.24\")   Wt 58.1 kg (128 lb)   LMP 2024 (Exact Date)   SpO2 98%   Breastfeeding No   BMI 24.80 kg/m      Physical Exam:  General Appearance: Alert, cooperative, no distress, appears stated age  Abdomen: Soft, non-tender, no masses, no organomegaly  Pelvic:Normally developed genitalia with no external lesions or eruptions. Vagina and cervix show no lesions, inflammation, discharge or tenderness. Grade 1 cystocele, No rectocele. Uterus normal.      Cassie Iyer MD      "

## 2025-04-09 ENCOUNTER — TRANSFERRED RECORDS (OUTPATIENT)
Dept: HEALTH INFORMATION MANAGEMENT | Facility: CLINIC | Age: 25
End: 2025-04-09
Payer: COMMERCIAL

## 2025-04-10 ENCOUNTER — PATIENT OUTREACH (OUTPATIENT)
Dept: CARE COORDINATION | Facility: CLINIC | Age: 25
End: 2025-04-10
Payer: COMMERCIAL

## 2025-04-13 ENCOUNTER — MYC MEDICAL ADVICE (OUTPATIENT)
Dept: FAMILY MEDICINE | Facility: CLINIC | Age: 25
End: 2025-04-13
Payer: COMMERCIAL

## 2025-04-14 ENCOUNTER — OFFICE VISIT (OUTPATIENT)
Dept: FAMILY MEDICINE | Facility: CLINIC | Age: 25
End: 2025-04-14
Payer: COMMERCIAL

## 2025-04-14 VITALS
HEIGHT: 60 IN | BODY MASS INDEX: 25 KG/M2 | OXYGEN SATURATION: 100 % | HEART RATE: 62 BPM | RESPIRATION RATE: 16 BRPM | DIASTOLIC BLOOD PRESSURE: 71 MMHG | SYSTOLIC BLOOD PRESSURE: 113 MMHG | WEIGHT: 127.31 LBS | TEMPERATURE: 98.2 F

## 2025-04-14 DIAGNOSIS — N88.8 NABOTHIAN CYST: Primary | ICD-10-CM

## 2025-04-14 PROCEDURE — 3078F DIAST BP <80 MM HG: CPT | Performed by: FAMILY MEDICINE

## 2025-04-14 PROCEDURE — 99213 OFFICE O/P EST LOW 20 MIN: CPT | Performed by: FAMILY MEDICINE

## 2025-04-14 PROCEDURE — 3074F SYST BP LT 130 MM HG: CPT | Performed by: FAMILY MEDICINE

## 2025-04-14 NOTE — PROGRESS NOTES
Assessment & Plan     Nabothian cyst  Exam consistent with tiny nabothian cyst. Patient reassured. Follow up if noting any significant change.           Subjective   Gisela is a 24 year old, presenting for the following health issues:  Cervix check (Patient not pregnant )        4/14/2025     1:33 PM   Additional Questions   Roomed by Gena BARAJAS   Accompanied by self     Yesterday noticed a bump on cervix. No pain. Felt it with fingers. Has never noticed this before. Denies vaginal itching or discharge. Menstruating currently. Worried it could be cancer. Last pap 5/13/24 NILM. No history of abnormal pap.     Vaginal delivery in February. Lochia resolved and normal period started yesterday (first since delivery).                    Objective    /71   Pulse 62   Temp 98.2  F (36.8  C) (Oral)   Resp 16   Ht 1.524 m (5')   Wt 57.7 kg (127 lb 5 oz)   LMP 04/13/2025 (Exact Date)   SpO2 100%   Breastfeeding No   BMI 24.86 kg/m    Body mass index is 24.86 kg/m .  Physical Exam     Gen: well appearing, NAD  Pelvis: normal external genitalia. Speculum exam reveals normal vaginal mucosa and blood in vaginal vault. Cervix tips posteriorly. Blood removed from cervix and on anterior cervix there is a tiny, normal appearing cyst. On bimanual exam, cyst can be easily felt. It is nontender.           Signed Electronically by: Ximena Ruiz MD

## 2025-04-14 NOTE — TELEPHONE ENCOUNTER
Writer responded to patient message via [employer].     Dwayne Lopez, MSN, RN   [facility]

## 2025-04-30 ENCOUNTER — MYC MEDICAL ADVICE (OUTPATIENT)
Dept: FAMILY MEDICINE | Facility: CLINIC | Age: 25
End: 2025-04-30
Payer: COMMERCIAL

## 2025-05-01 DIAGNOSIS — K59.00 CONSTIPATION, UNSPECIFIED CONSTIPATION TYPE: Primary | ICD-10-CM

## 2025-05-01 RX ORDER — ASPIRIN 81 MG
100 TABLET, DELAYED RELEASE (ENTERIC COATED) ORAL DAILY
Qty: 21 TABLET | Refills: 0 | Status: CANCELLED | OUTPATIENT
Start: 2025-05-01

## 2025-05-01 RX ORDER — DOCUSATE SODIUM 100 MG/1
100 CAPSULE, LIQUID FILLED ORAL DAILY PRN
Qty: 90 CAPSULE | Refills: 3 | Status: SHIPPED | OUTPATIENT
Start: 2025-05-01

## 2025-05-01 NOTE — TELEPHONE ENCOUNTER
Patient requested stool softner via [employer] message sent.  Per chart reviewed patient has been prescribed colace previously.  Medication pended for provider to review and advice.    J Luis Wheatley RN  [facility]

## 2025-05-02 NOTE — TELEPHONE ENCOUNTER
Writer attempt #1 to call patient to help schedule a visit for follow-up based on recent [employer] message. No answer, left non-detailed voicemail, with clinic call back number.     If patient calls back, please assist in scheduling a follow-up visit with PCP. Thanks.    Arely Antony, CLARIBELN, RN, PHN   [facility]

## 2025-05-05 ENCOUNTER — OFFICE VISIT (OUTPATIENT)
Dept: FAMILY MEDICINE | Facility: CLINIC | Age: 25
End: 2025-05-05
Payer: COMMERCIAL

## 2025-05-05 VITALS
DIASTOLIC BLOOD PRESSURE: 68 MMHG | HEART RATE: 70 BPM | BODY MASS INDEX: 24.55 KG/M2 | SYSTOLIC BLOOD PRESSURE: 98 MMHG | TEMPERATURE: 97.8 F | OXYGEN SATURATION: 99 % | WEIGHT: 125.06 LBS | HEIGHT: 60 IN | RESPIRATION RATE: 12 BRPM

## 2025-05-05 DIAGNOSIS — N88.8 CYST OF CERVIX: Primary | ICD-10-CM

## 2025-05-05 PROCEDURE — 99213 OFFICE O/P EST LOW 20 MIN: CPT | Performed by: FAMILY MEDICINE

## 2025-05-05 PROCEDURE — 3078F DIAST BP <80 MM HG: CPT | Performed by: FAMILY MEDICINE

## 2025-05-05 PROCEDURE — 3074F SYST BP LT 130 MM HG: CPT | Performed by: FAMILY MEDICINE

## 2025-05-05 PROCEDURE — 87591 N.GONORRHOEAE DNA AMP PROB: CPT | Performed by: FAMILY MEDICINE

## 2025-05-05 PROCEDURE — 87491 CHLMYD TRACH DNA AMP PROBE: CPT | Performed by: FAMILY MEDICINE

## 2025-05-05 PROCEDURE — 88175 CYTOPATH C/V AUTO FLUID REDO: CPT | Performed by: FAMILY MEDICINE

## 2025-05-05 NOTE — TELEPHONE ENCOUNTER
Writer responded to patient message via [employer].     Dwayne Lopez, MSN, RN   [facility]

## 2025-05-05 NOTE — PROGRESS NOTES
{PROVIDER CHARTING PREFERENCE:115072}    Heather Higgins is a 25 year old, presenting for the following health issues:  Gyn Exam      5/5/2025     2:39 PM   Additional Questions   Roomed by LYNNE Singh   Accompanied by self     History of Present Illness       Reason for visit:  Cervical exam    She eats 2-3 servings of fruits and vegetables daily.She consumes 2 sweetened beverage(s) daily.She exercises with enough effort to increase her heart rate 30 to 60 minutes per day.  She exercises with enough effort to increase her heart rate 5 days per week.   She is taking medications regularly.        {MA/LPN/RN Pre-Provider Visit Orders- hCG/UA/Strep (Optional):927011}  {SUPERLIST (Optional):190786}  {additonal problems for provider to add (Optional):845912}    {ROS Picklists (Optional):436703}      Objective    BP 98/68 (BP Location: Left arm, Patient Position: Sitting, Cuff Size: Adult Regular)   Pulse 70   Temp 97.8  F (36.6  C) (Oral)   Resp 12   Ht 1.524 m (5')   Wt 56.7 kg (125 lb 1 oz)   LMP 04/13/2025 (Exact Date)   SpO2 99%   Breastfeeding No   BMI 24.42 kg/m    Body mass index is 24.42 kg/m .  Physical Exam   {Exam List (Optional):127509}    {Diagnostic Test Results (Optional):386800}        Signed Electronically by: Ximena Ruiz MD  {Email feedback regarding this note to [email or web address]   :[id number]}

## 2025-05-06 LAB
C TRACH DNA SPEC QL PROBE+SIG AMP: NEGATIVE
N GONORRHOEA DNA SPEC QL NAA+PROBE: NEGATIVE
SPECIMEN TYPE: NORMAL

## 2025-05-08 ENCOUNTER — MYC MEDICAL ADVICE (OUTPATIENT)
Dept: FAMILY MEDICINE | Facility: CLINIC | Age: 25
End: 2025-05-08
Payer: COMMERCIAL

## 2025-05-08 LAB
BKR LAB AP GYN ADEQUACY: NORMAL
BKR LAB AP GYN INTERPRETATION: NORMAL
BKR LAB AP HPV REFLEX: NORMAL
BKR LAB AP PREVIOUS ABNORMAL: NORMAL
PATH REPORT.COMMENTS IMP SPEC: NORMAL
PATH REPORT.RELEVANT HX SPEC: NORMAL

## 2025-05-12 ENCOUNTER — RESULTS FOLLOW-UP (OUTPATIENT)
Dept: OBGYN | Facility: CLINIC | Age: 25
End: 2025-05-12

## 2025-05-14 ENCOUNTER — TRANSFERRED RECORDS (OUTPATIENT)
Dept: HEALTH INFORMATION MANAGEMENT | Facility: CLINIC | Age: 25
End: 2025-05-14
Payer: COMMERCIAL

## 2025-05-22 ENCOUNTER — THERAPY VISIT (OUTPATIENT)
Dept: PHYSICAL THERAPY | Facility: REHABILITATION | Age: 25
End: 2025-05-22
Payer: COMMERCIAL

## 2025-05-22 DIAGNOSIS — R39.15 URINARY URGENCY: ICD-10-CM

## 2025-05-22 DIAGNOSIS — R35.0 URINARY FREQUENCY: ICD-10-CM

## 2025-05-22 DIAGNOSIS — N81.11 CYSTOCELE, MIDLINE: Primary | ICD-10-CM

## 2025-08-10 ENCOUNTER — HOSPITAL ENCOUNTER (EMERGENCY)
Facility: HOSPITAL | Age: 25
Discharge: HOME | End: 2025-08-10
Attending: STUDENT IN AN ORGANIZED HEALTH CARE EDUCATION/TRAINING PROGRAM | Admitting: STUDENT IN AN ORGANIZED HEALTH CARE EDUCATION/TRAINING PROGRAM
Payer: COMMERCIAL

## 2025-08-10 ENCOUNTER — APPOINTMENT (OUTPATIENT)
Dept: CT IMAGING | Facility: HOSPITAL | Age: 25
End: 2025-08-10
Attending: STUDENT IN AN ORGANIZED HEALTH CARE EDUCATION/TRAINING PROGRAM
Payer: COMMERCIAL

## 2025-08-10 ENCOUNTER — APPOINTMENT (OUTPATIENT)
Dept: ULTRASOUND IMAGING | Facility: HOSPITAL | Age: 25
End: 2025-08-10
Attending: STUDENT IN AN ORGANIZED HEALTH CARE EDUCATION/TRAINING PROGRAM
Payer: COMMERCIAL

## 2025-08-10 VITALS
HEART RATE: 80 BPM | OXYGEN SATURATION: 100 % | RESPIRATION RATE: 14 BRPM | TEMPERATURE: 99 F | DIASTOLIC BLOOD PRESSURE: 69 MMHG | BODY MASS INDEX: 23.16 KG/M2 | WEIGHT: 118 LBS | HEIGHT: 60 IN | SYSTOLIC BLOOD PRESSURE: 108 MMHG

## 2025-08-10 DIAGNOSIS — R10.30 LOWER ABDOMINAL PAIN: Primary | ICD-10-CM

## 2025-08-10 LAB
ALBUMIN SERPL BCG-MCNC: 4.7 G/DL (ref 3.5–5.2)
ALBUMIN UR-MCNC: NEGATIVE MG/DL
ALP SERPL-CCNC: 66 U/L (ref 40–150)
ALT SERPL W P-5'-P-CCNC: 13 U/L (ref 0–50)
ANION GAP SERPL CALCULATED.3IONS-SCNC: 10 MMOL/L (ref 7–15)
APPEARANCE UR: CLEAR
AST SERPL W P-5'-P-CCNC: 19 U/L (ref 0–45)
BACTERIA #/AREA URNS HPF: ABNORMAL /HPF
BASOPHILS # BLD AUTO: 0 10E3/UL (ref 0–0.2)
BASOPHILS NFR BLD AUTO: 0 %
BILIRUB SERPL-MCNC: 0.6 MG/DL
BILIRUB UR QL STRIP: NEGATIVE
BUN SERPL-MCNC: 10.6 MG/DL (ref 6–20)
CALCIUM SERPL-MCNC: 10.2 MG/DL (ref 8.8–10.4)
CHLORIDE SERPL-SCNC: 102 MMOL/L (ref 98–107)
COLOR UR AUTO: ABNORMAL
CREAT SERPL-MCNC: 0.64 MG/DL (ref 0.51–0.95)
EGFRCR SERPLBLD CKD-EPI 2021: >90 ML/MIN/1.73M2
EOSINOPHIL # BLD AUTO: 0 10E3/UL (ref 0–0.7)
EOSINOPHIL NFR BLD AUTO: 0 %
ERYTHROCYTE [DISTWIDTH] IN BLOOD BY AUTOMATED COUNT: 12.3 % (ref 10–15)
GLUCOSE SERPL-MCNC: 114 MG/DL (ref 70–99)
GLUCOSE UR STRIP-MCNC: NEGATIVE MG/DL
HCG SERPL QL: NEGATIVE
HCO3 SERPL-SCNC: 28 MMOL/L (ref 22–29)
HCT VFR BLD AUTO: 40.5 % (ref 35–47)
HGB BLD-MCNC: 13 G/DL (ref 11.7–15.7)
HGB UR QL STRIP: NEGATIVE
IMM GRANULOCYTES # BLD: 0 10E3/UL
IMM GRANULOCYTES NFR BLD: 0 %
KETONES UR STRIP-MCNC: NEGATIVE MG/DL
LEUKOCYTE ESTERASE UR QL STRIP: ABNORMAL
LIPASE SERPL-CCNC: 23 U/L (ref 13–60)
LYMPHOCYTES # BLD AUTO: 1.3 10E3/UL (ref 0.8–5.3)
LYMPHOCYTES NFR BLD AUTO: 13 %
MCH RBC QN AUTO: 28.4 PG (ref 26.5–33)
MCHC RBC AUTO-ENTMCNC: 32.1 G/DL (ref 31.5–36.5)
MCV RBC AUTO: 89 FL (ref 78–100)
MONOCYTES # BLD AUTO: 0.5 10E3/UL (ref 0–1.3)
MONOCYTES NFR BLD AUTO: 5 %
NEUTROPHILS # BLD AUTO: 8.1 10E3/UL (ref 1.6–8.3)
NEUTROPHILS NFR BLD AUTO: 81 %
NITRATE UR QL: POSITIVE
NRBC # BLD AUTO: 0 10E3/UL
NRBC BLD AUTO-RTO: 0 /100
PH UR STRIP: 7 [PH] (ref 5–7)
PLATELET # BLD AUTO: 240 10E3/UL (ref 150–450)
POTASSIUM SERPL-SCNC: 4 MMOL/L (ref 3.4–5.3)
PROT SERPL-MCNC: 7.8 G/DL (ref 6.4–8.3)
RBC # BLD AUTO: 4.57 10E6/UL (ref 3.8–5.2)
RBC URINE: <1 /HPF
SODIUM SERPL-SCNC: 140 MMOL/L (ref 135–145)
SP GR UR STRIP: 1.01 (ref 1–1.03)
SQUAMOUS EPITHELIAL: 4 /HPF
UROBILINOGEN UR STRIP-MCNC: NORMAL MG/DL
WBC # BLD AUTO: 10 10E3/UL (ref 4–11)
WBC URINE: 5 /HPF

## 2025-08-10 PROCEDURE — 96375 TX/PRO/DX INJ NEW DRUG ADDON: CPT

## 2025-08-10 PROCEDURE — 36415 COLL VENOUS BLD VENIPUNCTURE: CPT | Performed by: STUDENT IN AN ORGANIZED HEALTH CARE EDUCATION/TRAINING PROGRAM

## 2025-08-10 PROCEDURE — 85025 COMPLETE CBC W/AUTO DIFF WBC: CPT | Performed by: STUDENT IN AN ORGANIZED HEALTH CARE EDUCATION/TRAINING PROGRAM

## 2025-08-10 PROCEDURE — 250N000011 HC RX IP 250 OP 636: Performed by: STUDENT IN AN ORGANIZED HEALTH CARE EDUCATION/TRAINING PROGRAM

## 2025-08-10 PROCEDURE — 93976 VASCULAR STUDY: CPT

## 2025-08-10 PROCEDURE — 74177 CT ABD & PELVIS W/CONTRAST: CPT

## 2025-08-10 PROCEDURE — 84703 CHORIONIC GONADOTROPIN ASSAY: CPT | Performed by: STUDENT IN AN ORGANIZED HEALTH CARE EDUCATION/TRAINING PROGRAM

## 2025-08-10 PROCEDURE — 81003 URINALYSIS AUTO W/O SCOPE: CPT | Performed by: STUDENT IN AN ORGANIZED HEALTH CARE EDUCATION/TRAINING PROGRAM

## 2025-08-10 PROCEDURE — 99285 EMERGENCY DEPT VISIT HI MDM: CPT | Mod: 25 | Performed by: STUDENT IN AN ORGANIZED HEALTH CARE EDUCATION/TRAINING PROGRAM

## 2025-08-10 PROCEDURE — 83690 ASSAY OF LIPASE: CPT | Performed by: STUDENT IN AN ORGANIZED HEALTH CARE EDUCATION/TRAINING PROGRAM

## 2025-08-10 PROCEDURE — 80053 COMPREHEN METABOLIC PANEL: CPT | Performed by: STUDENT IN AN ORGANIZED HEALTH CARE EDUCATION/TRAINING PROGRAM

## 2025-08-10 PROCEDURE — 87088 URINE BACTERIA CULTURE: CPT | Performed by: STUDENT IN AN ORGANIZED HEALTH CARE EDUCATION/TRAINING PROGRAM

## 2025-08-10 PROCEDURE — 96365 THER/PROPH/DIAG IV INF INIT: CPT | Mod: 59

## 2025-08-10 RX ORDER — IOPAMIDOL 755 MG/ML
58 INJECTION, SOLUTION INTRAVASCULAR ONCE
Status: COMPLETED | OUTPATIENT
Start: 2025-08-10 | End: 2025-08-10

## 2025-08-10 RX ORDER — CEFTRIAXONE 1 G/1
1 INJECTION, POWDER, FOR SOLUTION INTRAMUSCULAR; INTRAVENOUS ONCE
Status: COMPLETED | OUTPATIENT
Start: 2025-08-10 | End: 2025-08-10

## 2025-08-10 RX ORDER — KETOROLAC TROMETHAMINE 15 MG/ML
15 INJECTION, SOLUTION INTRAMUSCULAR; INTRAVENOUS ONCE
Status: COMPLETED | OUTPATIENT
Start: 2025-08-10 | End: 2025-08-10

## 2025-08-10 RX ORDER — CEPHALEXIN 500 MG/1
500 CAPSULE ORAL 4 TIMES DAILY
Qty: 28 CAPSULE | Refills: 0 | Status: SHIPPED | OUTPATIENT
Start: 2025-08-10 | End: 2025-08-17

## 2025-08-10 RX ADMIN — CEFTRIAXONE SODIUM 1 G: 1 INJECTION, POWDER, FOR SOLUTION INTRAMUSCULAR; INTRAVENOUS at 14:06

## 2025-08-10 RX ADMIN — IOPAMIDOL 58 ML: 755 INJECTION, SOLUTION INTRAVENOUS at 14:03

## 2025-08-10 RX ADMIN — KETOROLAC TROMETHAMINE 15 MG: 15 INJECTION, SOLUTION INTRAMUSCULAR; INTRAVENOUS at 11:45

## 2025-08-10 ASSESSMENT — COLUMBIA-SUICIDE SEVERITY RATING SCALE - C-SSRS
1. IN THE PAST MONTH, HAVE YOU WISHED YOU WERE DEAD OR WISHED YOU COULD GO TO SLEEP AND NOT WAKE UP?: NO
2. HAVE YOU ACTUALLY HAD ANY THOUGHTS OF KILLING YOURSELF IN THE PAST MONTH?: NO
6. HAVE YOU EVER DONE ANYTHING, STARTED TO DO ANYTHING, OR PREPARED TO DO ANYTHING TO END YOUR LIFE?: NO

## 2025-08-10 ASSESSMENT — ACTIVITIES OF DAILY LIVING (ADL)
ADLS_ACUITY_SCORE: 56

## 2025-08-12 ENCOUNTER — TELEPHONE (OUTPATIENT)
Dept: NURSING | Facility: CLINIC | Age: 25
End: 2025-08-12
Payer: COMMERCIAL

## 2025-08-12 LAB
BACTERIA UR CULT: ABNORMAL
BACTERIA UR CULT: ABNORMAL

## 2025-08-21 PROBLEM — L91.0 KELOID SCAR: Status: ACTIVE | Noted: 2025-08-21

## 2025-08-25 ENCOUNTER — TRANSFERRED RECORDS (OUTPATIENT)
Dept: HEALTH INFORMATION MANAGEMENT | Facility: CLINIC | Age: 25
End: 2025-08-25
Payer: COMMERCIAL